# Patient Record
Sex: FEMALE | Race: BLACK OR AFRICAN AMERICAN | NOT HISPANIC OR LATINO | Employment: OTHER | URBAN - METROPOLITAN AREA
[De-identification: names, ages, dates, MRNs, and addresses within clinical notes are randomized per-mention and may not be internally consistent; named-entity substitution may affect disease eponyms.]

---

## 2017-02-15 ENCOUNTER — HISTORICAL (OUTPATIENT)
Dept: INTERNAL MEDICINE | Facility: CLINIC | Age: 72
End: 2017-02-15

## 2017-05-24 ENCOUNTER — HISTORICAL (OUTPATIENT)
Dept: INTERNAL MEDICINE | Facility: CLINIC | Age: 72
End: 2017-05-24

## 2018-05-08 ENCOUNTER — HISTORICAL (OUTPATIENT)
Dept: INTERNAL MEDICINE | Facility: CLINIC | Age: 73
End: 2018-05-08

## 2018-09-19 ENCOUNTER — HISTORICAL (OUTPATIENT)
Dept: RADIOLOGY | Facility: HOSPITAL | Age: 73
End: 2018-09-19

## 2018-10-11 ENCOUNTER — HISTORICAL (OUTPATIENT)
Dept: RADIOLOGY | Facility: HOSPITAL | Age: 73
End: 2018-10-11

## 2018-11-13 ENCOUNTER — HISTORICAL (OUTPATIENT)
Dept: ADMINISTRATIVE | Facility: HOSPITAL | Age: 73
End: 2018-11-13

## 2020-01-27 ENCOUNTER — HISTORICAL (OUTPATIENT)
Dept: INTERNAL MEDICINE | Facility: CLINIC | Age: 75
End: 2020-01-27

## 2020-05-06 ENCOUNTER — HISTORICAL (OUTPATIENT)
Dept: RADIOLOGY | Facility: HOSPITAL | Age: 75
End: 2020-05-06

## 2020-07-19 ENCOUNTER — HOSPITAL ENCOUNTER (OUTPATIENT)
Dept: MEDSURG UNIT | Facility: HOSPITAL | Age: 75
End: 2020-07-24
Attending: INTERNAL MEDICINE | Admitting: INTERNAL MEDICINE

## 2020-07-20 LAB
CHOLEST SERPL-MSCNC: 167 MG/DL (ref 0–200)
HDLC SERPL-MCNC: 34 MG/DL (ref 35–70)
LDLC SERPL CALC-MCNC: 110 MG/DL (ref 0–160)
TRIGL SERPL-MCNC: 115 MG/DL (ref 40–160)

## 2020-11-17 ENCOUNTER — HISTORICAL (OUTPATIENT)
Dept: INTERNAL MEDICINE | Facility: CLINIC | Age: 75
End: 2020-11-17

## 2020-11-17 LAB
HBA1C MFR BLD: 6.4 % (ref 4–6)
HCV AB SERPL QL IA: NEGATIVE

## 2020-11-30 ENCOUNTER — HISTORICAL (OUTPATIENT)
Dept: RADIOLOGY | Facility: HOSPITAL | Age: 75
End: 2020-11-30

## 2021-02-10 ENCOUNTER — HOSPITAL ENCOUNTER (OUTPATIENT)
Dept: MEDSURG UNIT | Facility: HOSPITAL | Age: 76
End: 2021-02-12
Attending: INTERNAL MEDICINE | Admitting: INTERNAL MEDICINE

## 2022-02-04 ENCOUNTER — HISTORICAL (OUTPATIENT)
Dept: ADMINISTRATIVE | Facility: HOSPITAL | Age: 77
End: 2022-02-04

## 2022-02-04 ENCOUNTER — HISTORICAL (OUTPATIENT)
Dept: RADIOLOGY | Facility: HOSPITAL | Age: 77
End: 2022-02-04

## 2022-04-07 ENCOUNTER — HISTORICAL (OUTPATIENT)
Dept: ADMINISTRATIVE | Facility: HOSPITAL | Age: 77
End: 2022-04-07
Payer: COMMERCIAL

## 2022-04-23 VITALS
DIASTOLIC BLOOD PRESSURE: 77 MMHG | SYSTOLIC BLOOD PRESSURE: 119 MMHG | BODY MASS INDEX: 34.27 KG/M2 | HEIGHT: 65 IN | WEIGHT: 205.69 LBS

## 2022-04-28 NOTE — ED PROVIDER NOTES
Patient:   Charlotte Choi            MRN: [id number]            FIN: [id number]               Age:   75 years     Sex:  Female     :  1945   Associated Diagnoses:   COVID-19 virus infection; Hypoxemia; Pneumonia   Author:   Luciana FLORES, Kiran Moore      Basic Information   Time seen: Date & time 2020 16:16:00.   History source: Patient.   Arrival mode: Private vehicle.   History limitation: None.   Additional information: Chief Complaint from Nursing Triage Note : Chief Complaint   2020 15:40 CDT      Chief Complaint           PT WEARING MASK IN /AASI W HX OF COVID + ON 20.  CO INCREASE IN SOB/CP SINCE THIS AM.  EKG OBTAINED.  .      History of Present Illness   The patient presents with difficulty breathing.  This is about her 10th or 11th day of illness, 1st symptoms onset about the 9th or the 10th of this month.  She was seen on the  at which time she was appropriate for outpatient management as documented, COVID testing was positive on that day.  She now presents with increasing dyspnea, notably since this morning.  Other symptoms have included fever, nausea, vomiting, diarrhea, chills, chest pain, generalized weakness, generalized body aches.  On arrival, initial oxygen saturation 92% on room air with mild tachypnea, borderline elevation of blood pressure and otherwise normal vital signs.  She has underlying risk factors including hypertension, diabetes, obesity.  No other specific complaints..        Review of Systems   Constitutional symptoms:  Fever, no chills, no weakness.    Skin symptoms:  Negative except as documented in HPI, no rash, no breakdown.    Eye symptoms:  Negative except as documented in HPI, no recent vision problems, no pain.    ENMT symptoms:  Negative except as documented in HPI, no ear pain, no sore throat.    Respiratory symptoms:  Shortness of breath, no cough, no wheezing.    Cardiovascular symptoms:  Chest pain, no palpitations, no syncope.     Gastrointestinal symptoms:  Nausea, vomiting, diarrhea, No abdominal pain,    Genitourinary symptoms:  Negative except as documented in HPI, no dysuria, no hematuria.    Musculoskeletal symptoms:  Negative except as documented in HPI, no Muscle pain, no Joint pain.    Neurologic symptoms:  Negative except as documented in HPI, no altered level of consciousness, no weakness.    Psychiatric symptoms:  Negative except as documented in HPI, no anxiety, no depression.    Endocrine symptoms:  Negative except as documented in HPI, no polyuria, no polydipsia.    Hematologic/Lymphatic symptoms:  Negative except as documented in HPI, bleeding tendency negative, bruising tendency negative.    Allergy/immunologic symptoms:  Negative except as documented in HPI, no recurrent infections, no impaired immunity.              Additional review of systems information: All other systems reviewed and otherwise negative, All systems reviewed as documented in chart.      Health Status   Allergies:    Allergic Reactions (Selected)  Severity Not Documented  Contrast media (iodine-based)- No reactions were documented.,    Allergies (1) Active Reaction  contrast media (iodine-based) None Documented  .   Medications:  (Selected)   Prescriptions  Prescribed  Flonase 50 mcg/inh nasal spray: 1 spray(s), Nasal, BID, # 1 bottle(s), 6 Refill(s), Pharmacy: [id number]  Lasix 20 mg oral tablet: 20 mg = 1 tab(s), Oral, Daily, # 90 tab(s), 3 Refill(s), Pharmacy: [facility], 163, cm, Height/Length Dosing, 02/11/20 8:48:00 CST, 96.2, kg, Weight Dosing, 02/11/20 8:48:00 CST  Lipitor 80 mg oral tablet: 80 mg = 1 tab(s), Oral, Daily, # 90 tab(s), 3 Refill(s), Pharmacy: [facility]  Metoprolol Succinate ER 25 mg oral tablet extended release: 25 mg = 1 tab(s), Oral, Daily, # 30 tab(s), 3 Refill(s), Pharmacy: [facility], 163, cm, Height/Length Dosing, 02/11/20 8:48:00 CST, 96.2, kg, Weight Dosing, 02/11/20 8:48:00  CST  Norvasc 10 mg oral tablet: 10 mg = 1 tab(s), Oral, Daily, # 30 tab(s), 6 Refill(s), Pharmacy: [facility]  blood pressure cuff: blood pressure cuff, See Instructions, take BP in the morning before breakfast, # 1 EA, 0 Refill(s)  cane straight: cane straight, See Instructions, please dispense cane to help with walking  Dx gait imbalance, # 1 EA, 0 Refill(s)  gabapentin 600 mg oral tablet: 600 mg = 1 tab(s), Oral, TID, # 270 tab(s), 3 Refill(s), Pharmacy: [facility]  glucometer: glucometer, See Instructions, Check CBG 4 times per day.  Medically necessary, # 1 EA, 0 Refill(s)  glucometer: glucometer, See Instructions, Check CBG 4 times per day.  Medically necessary, # 1 EA, 0 Refill(s)  lancets, strips, alcohol swabs: lancets, strips, alcohol swabs, See Instructions, medicially necessary  check CBGs 4x per day, # 100 EA, 11 Refill(s)  lisinopril 20 mg oral tablet: 20 mg = 1 tab(s), Oral, Daily, # 90 tab(s), 3 Refill(s), Pharmacy: [facility], 163, cm, Height/Length Dosing, 02/11/20 8:48:00 CST, 96.2, kg, Weight Dosing, 02/11/20 8:48:00 CST  metformin 500 mg oral tablet: 500 mg = 1 tab(s), Oral, BID, for diabetes, # 180 tab(s), 3 Refill(s), Pharmacy: [facility]  Documented Medications  Documented  latanoprost 0.005% ophthalmic solution: .      Past Medical/ Family/ Social History   Medical history:    Resolved  Diabetes mellitus ([id number]):  Resolved.  Hypertension ([id number]):  Resolved.  Hyperlipidemia ([id number]):  Resolved.  Arthritis ([id number]):  Resolved..   Surgical history:    hysterectomy.  Lumpectomy of breast ([id number])..   Family history:    DIABETES MELLITUS  Mother  Sister  Stroke  Brother  Alzheimer's disease  Sister  Sister  Cancer  Father  Hypertension.  Mother  Sister  .   Social history:    Social & Psychosocial Habits    Alcohol  10/12/2013 Risk Assessment: Denies Alcohol Use    02/11/2020  Use:  Never    Employment/School  11/21/2016  Status: Retired    Comment: 7TH GRADE - 11/21/2016 09:25 - [employer]    Exercise  02/11/2020  Duration (average number of minutes): 0    Home/Environment  02/11/2020  Lives with: Dino    Nutrition/Health  02/11/2020  Home Diet Regular    Substance Use  10/12/2013 Risk Assessment: Denies Substance Abuse    05/20/2015  Use: Never    Tobacco  10/12/2013 Risk Assessment: Denies Tobacco Use    02/11/2020  Use: Former smoker, quit more    Patient Wants Consult For Cessation Counseling N/A    07/19/2020  Use: Never (less than 100 in l    Patient Wants Consult For Cessation Counseling No    Abuse/Neglect  02/11/2020  SHX Any signs of abuse or neglect No    Feels unsafe at home: No    Safe place to go: Yes    07/19/2020  SHX Any signs of abuse or neglect No    Feels unsafe at home: No    Safe place to go: Yes  .   Problem list:    Active Problems (6)  2019-nCoV   DM (diabetes mellitus), type 2   HTN (hypertension)   Hyperlipidemia   Knowledge deficit   Muscle strain of left shoulder   .      Physical Examination               Vital Signs   Vital Signs   7/19/2020 15:40 CDT      Temperature Oral          37.1 DegC                             Temperature Oral (calculated)             98.78 DegF                             Peripheral Pulse Rate     90 bpm                             Respiratory Rate          20 br/min                             SpO2                      93 %                             Oxygen Therapy            Room air                             Systolic Blood Pressure   146 mmHg  HI                             Diastolic Blood Pressure  85 mmHg  .      Vital Signs (last 24 hrs)_____  Last Charted___________  Temp Oral     37.1 DegC  (JUL 19 15:40)  Heart Rate Peripheral   90 bpm  (JUL 19 15:40)  Resp Rate         20 br/min  (JUL 19 15:40)  SBP      H 146mmHg  (JUL 19 15:40)  DBP      85 mmHg  (JUL 19 15:40)  SpO2      93 %  (JUL 19 15:40)  Height      162  cm  (JUL 19 15:40)  .   Measurements   7/19/2020 15:40 CDT      Weight Dosing             100 kg                             Weight Measured and Calculated in Lbs     220.46 lb                             Weight Estimated          100 kg                             Height/Length Dosing      162 cm                             Height/Length Measured    162 cm  .   Basic Oxygen Information   7/19/2020 15:40 CDT      SpO2                      93 %                             Oxygen Therapy            Room air  .   General:  Alert, mild distress, anxious, Obese.    Skin:  Warm, dry, normal for ethnicity.    Head:  Normocephalic, atraumatic.    Neck:  Supple, trachea midline, no tenderness.    Eye:  Pupils are equal, round and reactive to light, extraocular movements are intact, normal conjunctiva.    Ears, nose, mouth and throat:  Oral mucosa moist, no pharyngeal erythema or exudate.    Cardiovascular:  Regular rate and rhythm, No murmur, Normal peripheral perfusion, No edema.    Respiratory:  Lungs are clear to auscultation, respirations are non-labored, breath sounds are equal, Symmetrical chest wall expansion.    Chest wall:  No tenderness, No deformity.    Back:  Nontender, Normal range of motion, Normal alignment.    Musculoskeletal:  Normal ROM, normal strength, no tenderness, no swelling, no deformity.    Gastrointestinal:  Soft, Nontender, Non distended, Normal bowel sounds.    Neurological:  Alert and oriented to person, place, time, and situation, No focal neurological deficit observed, CN II-XII intact, normal motor observed, normal speech observed.    Lymphatics:  No lymphadenopathy.   Psychiatric:  Cooperative, appropriate mood & affect, normal judgment.       Medical Decision Making   Documents reviewed:  Emergency department nurses' notes, emergency department records, prior records.    Orders  Launch Orders   Laboratory:  Urine Culture and Sensitivity (Order): Stat collect, 7/19/2020 16:37 CDT, Urine,  Clean Catch, Nurse collect  Urinalysis with Micro UHC (Order): Stat collect, Urine, 7/19/2020 16:37 CDT, Nurse collect  Troponin-I (Order): Stat collect, 7/19/2020 16:37 CDT, Blood, Lab Collect, 7/19/2020 16:37 CDT  Sedimentation Rate (Order): Stat collect, 7/19/2020 16:37 CDT, Blood, Lab Collect, 7/19/2020 16:37 CDT  PTT (Order): Stat collect, 7/19/2020 16:36 CDT, Blood, Lab Collect, 7/19/2020 16:36 CDT  Phosphorus Level (Order): Stat collect, 7/19/2020 16:36 CDT, Blood, Lab Collect, 7/19/2020 16:36 CDT  Magnesium Level (Order): Stat collect, 7/19/2020 16:36 CDT, Blood, Lab Collect, 7/19/2020 16:36 CDT  LDH (Order): Stat collect, 7/19/2020 16:36 CDT, Blood, Lab Collect, 7/19/2020 16:36 CDT  Lactic Acid (Order): Stat collect, 7/19/2020 16:36 CDT, Blood, Lab Collect, 7/19/2020 16:36 CDT  INR - Protime (Order): Stat collect, 7/19/2020 16:36 CDT, Blood, Lab Collect, 7/19/2020 16:36 CDT  Fibrinogen Level (Order): Stat collect, 7/19/2020 16:36 CDT, Blood, Lab Collect, 7/19/2020 16:36 CDT  Ferritin (Order): Stat collect, 7/19/2020 16:36 CDT, Blood, Lab Collect, 7/19/2020 16:36 CDT  D-Dimer (Order): Stat collect, 7/19/2020 16:36 CDT, Blood, Lab Collect, 7/19/2020 16:36 CDT  CRP (Order): Stat collect, 7/19/2020 16:35 CDT, Blood, Lab Collect, 7/19/2020 16:35 CDT  CPK (Order): Stat collect, 7/19/2020 16:35 CDT, Blood, Lab Collect, 7/19/2020 16:35 CDT  CMP (Order): Stat collect, 7/19/2020 16:35 CDT, Blood, Lab Collect, 7/19/2020 16:35 CDT  CK MB (Order): Stat collect, 7/19/2020 16:35 CDT, Blood, Lab Collect, 7/19/2020 16:35 CDT  CBC w/ Auto Diff (Order): Now collect, 7/19/2020 16:35 CDT, Blood, Lab Collect, 7/19/2020 16:35 CDT  BNP-Pro (Order): Stat collect, 7/19/2020 16:34 CDT, Blood, Lab Collect, 7/19/2020 16:34 CDT  Blood Culture (Order): 7/19/2020 16:34 CDT, Stat collect, Blood  Blood Culture (Order): 7/19/2020 16:34 CDT, Stat collect, Blood  ABG Adult RT (Order): Stat collect, Arterial Blood, 7/19/2020 16:34 CDT, Once,  Stop date 7/19/2020 16:34 CDT  Patient Care:  Saline Lock Insert (Order): 7/19/2020 16:36 CDT  Pulse Oximetry (Order): 7/19/2020 16:36 CDT  Patient Isolation (Order): 7/19/2020 16:36 CDT, Airborne Precautions, CM Isolation, Constant Indicator  Cardiac Monitoring (Order): 7/19/2020 16:34 CDT, Constant Order  Pharmacy:  [facility] HFA (Order): 4 puff(s), form: Inhaler, INH, Once, first dose 7/19/2020 16:34 CDT, stop date 7/19/2020 16:34 CDT, self administer 1 standard puff x 4 over 15-30 minutes; first dose now  albuterol CFC free 90 mcg/inh inhalation aerosol with adapter (Order): 4 puff(s), form: Inhaler, INH, Once, first dose 7/19/2020 16:34 CDT, stop date 7/19/2020 16:34 CDT, self administer 1 standard puff x 4 over 15-30 minutes; first dose now  Radiology:  CXR 1 View (Order): Stat, 7/19/2020 16:36 CDT, Dyspnea, None, Stretcher, Rad Type, Not Scheduled  Cardiology:  EKG (Order): 7/19/2020 16:36 CDT, NOW, -1, -1, 7/19/2020 16:36 CDT  Respiratory Therapy:  Oxygen Therapy (Order): 7/19/2020 16:36 CDT, 3, Nasal Cannula, After RA ABG, CM Oxygen.   Cardiac monitor:  Time 7/19/2020 16:39:00, Rate 90, normal sinus rhythm.    Electrocardiogram:  Time 7/19/2020 15:42:00, rate 93, normal sinus rhythm, No ST-T changes, no ectopy, normal MO & QRS intervals.    Results review:  Lab results : Lab View   7/19/2020 17:20 CDT      Sample ABG                ARTERIAL                             Treatment                 ROOM AIR                             Site                      Radial Rt                             pH Art                    7.48  HI                             pCO2 Art                  36.0 mmHg                             pO2 Art                   61.0 mmHg  LOW                             HCO3 Art                  26.8 mmol/L  HI                             CO2 Totl Art              27.9 mmol/L  HI                             O2 Sat Art                93.9 %                             D base                     3.4  HI                             THB ABG                   13.8 gm/dL                             CO Hgb                    1.5 %  NA                             Met Hgb Art               0.8 %                             O2 Hgb                    91.7 %  LOW                             Allens                    Positive  .   Chest X-Ray:  Time reported 7/19/2020 17:43:00, interpretation by Emergency Physician, Typical bilateral diffuse mild scattered interstitial infiltrates..       Reexamination/ Reevaluation   Time: 7/19/2020 17:41:00 .   Vital signs   results included from flowsheet : Vital Signs   7/19/2020 17:05 CDT      Peripheral Pulse Rate     85 bpm                             Heart Rate Monitored      85 bpm                             Respiratory Rate          30 br/min  HI                             SpO2                      96 %                             Oxygen Therapy            Room air                             Systolic Blood Pressure   118 mmHg                             Diastolic Blood Pressure  67 mmHg                             Mean Arterial Pressure, Cuff              84 mmHg    7/19/2020 15:40 CDT      Temperature Oral          37.1 DegC                             Temperature Oral (calculated)             98.78 DegF                             Peripheral Pulse Rate     90 bpm                             Respiratory Rate          20 br/min                             SpO2                      93 %                             Oxygen Therapy            Room air                             Systolic Blood Pressure   146 mmHg  HI                             Diastolic Blood Pressure  85 mmHg     Course: unchanged.   Pain status: unchanged.   Notes:   Stable, no change.    .      Impression and Plan   Diagnosis   COVID-19 virus infection (IEK11-HF U07.1)   Hypoxemia (FRJ30-UB R09.02)   Pneumonia (NFQ78-BT J18.9)      Calls-Consults   -  7/19/2020 17:42:00 ,   Discussed with Internal  Medicine - to see and admit.    .    Plan   Disposition: Admit time  7/19/2020 17:42:00, Admit to Inpatient Telemetry Unit, Int. med..

## 2022-04-28 NOTE — ED PROVIDER NOTES
Patient:   Charlotte Choi            MRN: [id number]            FIN: [id number]               Age:   75 years     Sex:  Female     :  1945   Associated Diagnoses:   Dysmetria; Romberg's test positive   Author:   Sarmad Jo      Basic Information   Time seen: Immediately upon arrival.   History source: Patient.   Arrival mode: Private vehicle, walking.   History limitation: None.   Additional information: Chief Complaint from Nursing Triage Note : Chief Complaint   2/10/2021 13:20 CST      Chief Complaint           c/o dizziness, sound of rushing in her ears. since yesterday  .   Provider/Visit info:   Time Seen:  Sarmad Jo / 02/10/2021 13:25  .   History of Present Illness   The patient presents with vertigo, Blurry vision and L ear tinnitus.  The onset was 1  days ago.  The course/duration of symptoms is constant.  The character of symptoms is spinning and off-balance.  The degree at present is moderate.  The exacerbating factor is none.  The relieving factor is none.  Risk factors consist of hypertension, diabetes mellitus, HLD, CHF, not coronary artery disease, not cerebral vascular accident, not smoking, not head trauma, not medication change and not recent illness/injury.  Prior episodes: none.  Therapy today: see nurses notes.  Associated symptoms: headache, Sinus congestion, Denies ear discharge, ear pain, diplopia, blindness, neck stiffness, denies chest pain, denies nausea, denies vomiting, denies shortness of breath, denies abdominal pain, denies syncope, denies palpitations, denies altered speech, denies altered coordination, denies focal weakness, denies altered sensation, denies confusion, denies seizure, denies blood in stool and denies vaginal bleeding.  Additional history: 74 yo F w/ PMHx significant for HTN, HLD, DM & CHF presents to ED c/o 1 day hx of vertigo w/ associated blurred vision & tinnitus in L ear. Patient denies hx of similar symptoms. Denies recent head  trauma. Denies any identifiable aggravating or alleviating factors.        Review of Systems   Constitutional symptoms:  Weakness, fatigue.    Skin symptoms:  Negative except as documented in HPI.   Eye symptoms:  Negative except as documented in HPI.   ENMT symptoms:  No sore throat, no sinus pain.    Respiratory symptoms:  No cough, no wheezing.    Cardiovascular symptoms:  Negative except as documented in HPI.   Gastrointestinal symptoms:  Negative except as documented in HPI.   Genitourinary symptoms:  Negative except as documented in HPI.   Musculoskeletal symptoms:  Negative except as documented in HPI.   Neurologic symptoms:  Negative except as documented in HPI.   Psychiatric symptoms:  Negative except as documented in HPI.   Endocrine symptoms:  Negative except as documented in HPI.   Hematologic/Lymphatic symptoms:  Bleeding tendency negative, bruising tendency negative.    Allergy/immunologic symptoms:  No recurrent infections, no impaired immunity.              Additional review of systems information: All other systems reviewed and otherwise negative.      Health Status   Allergies:    Allergic Reactions (Selected)  Severity Not Documented  Contrast media (iodine-based)- No reactions were documented.,    Allergies (1) Active Reaction  contrast media (iodine-based) None Documented  .   Medications:  (Selected)   Prescriptions  Prescribed  DULoxetine 30 mg oral delayed release capsule: 30 mg = 1 cap(s), Oral, Daily, do not crush or chew, # 30 cap(s), 6 Refill(s), Pharmacy: [facility], 162, cm, Height/Length Dosing, 11/17/20 12:50:00 CST, 93, kg, Weight Dosing, 11/17/20 12:50:00 CST  Flonase 50 mcg/inh nasal spray: 1 spray(s), Nasal, BID, # 1 bottle(s), 6 Refill(s), Pharmacy: [facility]  Lasix 20 mg oral tablet: 20 mg = 1 tab(s), Oral, Daily, # 90 tab(s), 3 Refill(s), Pharmacy: [facility], 163, cm, Height/Length Dosing, 02/11/20 8:48:00 CST, 96.2, kg, Weight Dosing,  02/11/20 8:48:00 CST  Lipitor 80 mg oral tablet: 80 mg = 1 tab(s), Oral, Daily, # 90 tab(s), 3 Refill(s), Pharmacy: [facility], 162, cm, Height/Length Dosing, 07/19/20 18:46:00 CDT, 100, kg, Weight Dosing, 07/19/20 18:46:00 CDT  Metoprolol Succinate ER 25 mg oral tablet extended release: 25 mg = 1 tab(s), Oral, Daily, # 30 tab(s), 3 Refill(s), Pharmacy: [facility], 163, cm, Height/Length Dosing, 02/11/20 8:48:00 CST, 96.2, kg, Weight Dosing, 02/11/20 8:48:00 CST  Norvasc 10 mg oral tablet: 10 mg = 1 tab(s), Oral, Daily, # 30 tab(s), 6 Refill(s), Pharmacy: [facility] [address], 162, cm, Height/Length Dosing, 07/19/20 18:46:00 CDT, 100, kg, Weight Dosing, 07/19/20 18:46:00 CDT  blood pressure cuff: blood pressure cuff, See Instructions, take BP in the morning before breakfast, # 1 EA, 0 Refill(s)  cane straight: cane straight, See Instructions, please dispense cane to help with walking  Dx gait imbalance, # 1 EA, 0 Refill(s)  gabapentin 600 mg oral tablet: 600 mg = 1 tab(s), Oral, TID, # 270 tab(s), 3 Refill(s), Pharmacy: [facility] [address], 162, cm, Height/Length Dosing, 07/19/20 18:46:00 CDT, 100, kg, Weight Dosing, 07/19/20 18:46:00 CDT  glucometer: glucometer, See Instructions, Check CBG 4 times per day.  Medically necessary, # 1 EA, 0 Refill(s)  glucometer: glucometer, See Instructions, Check CBG 4 times per day.  Medically necessary, # 1 EA, 0 Refill(s)  lancets, strips, alcohol swabs: lancets, strips, alcohol swabs, See Instructions, medicially necessary  check CBGs 4x per day, # 100 EA, 11 Refill(s)  lisinopril 20 mg oral tablet: 20 mg = 1 tab(s), Oral, Daily, # 90 tab(s), 3 Refill(s), Pharmacy: [facility], 163, cm, Height/Length Dosing, 02/11/20 8:48:00 CST, 96.2, kg, Weight Dosing, 02/11/20 8:48:00 CST  metformin 500 mg oral tablet: 500 mg = 1 tab(s), Oral, BID, for diabetes, # 180 tab(s), 3 Refill(s), Pharmacy: [facility], 162, cm, Height/Length Dosing,  07/19/20 18:46:00 CDT, 100, kg, Weight Dosing, 07/19/20 18:46:00 CDT  tretinoin 0.02% topical cream: 1 hira, TOP, Once a day (at bedtime), # 40 gm, 2 Refill(s), Pharmacy: [facility], 162, cm, Height/Length Dosing, 11/17/20 12:50:00 CST, 93, kg, Weight Dosing, 11/17/20 12:50:00 CST, per nurse's notes.   Immunizations: Per nurse's notes.      Past Medical/ Family/ Social History   Medical history:    Resolved  Diabetes mellitus ([id number]):  Resolved.  Hypertension ([id number]):  Resolved.  Hyperlipidemia (33623339):  Resolved.  Arthritis ([id number]):  Resolved., Reviewed as documented in chart.   Surgical history:    hysterectomy.  Lumpectomy of breast ([id number])., Reviewed as documented in chart.   Family history:    DIABETES MELLITUS  Mother  Sister  Stroke  Brother  Alzheimer's disease  Sister  Sister  Cancer  Father  Hypertension.  Mother  Sister  , Reviewed as documented in chart.   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    Exercise  11/17/2020  Duration (average number of minutes): 10    Times per week: 1-2 times/week    Exercise type: Walking    Home/Environment  02/11/2020  Lives with: Son    Nutrition/Health  11/17/2020  Home Diet Regular    Substance Use  10/12/2013 Risk Assessment: Denies Substance Abuse    11/17/2020  Use: Never    Tobacco  10/12/2013 Risk Assessment: Denies Tobacco Use    11/17/2020  Use: Never (less than 100 in l    Patient Wants Consult For Cessation Counseling N/A    02/10/2021  Use: Never (less than 100 in l    Patient Wants Consult For Cessation Counseling No    Abuse/Neglect  11/17/2020  SHX Any signs of abuse or neglect No    Feels unsafe at home: No    Safe place to go: Yes    02/10/2021  SHX Any signs of abuse  or neglect No    Feels unsafe at home: No    Safe place to go: Yes    Spiritual/Cultural  11/17/2020  Rastafari Preference Anabaptist      11/17/2020  Branch of  Never in   , Reviewed as documented in chart.   Problem list:    Active Problems (8)  2019-nCoV   Diastolic dysfunction   DM (diabetes mellitus), type 2   HTN (hypertension)   Hyperlipidemia   Impaired mobility   Knowledge deficit   Muscle strain of left shoulder   , per nurse's notes.      Physical Examination               Vital Signs   Vital Signs   2/10/2021 13:20 CST      Temperature Oral          36.5 DegC                             Temperature Oral (calculated)             97.70 DegF                             Peripheral Pulse Rate     82 bpm                             Respiratory Rate          20 br/min                             SpO2                      97 %                             Oxygen Therapy            Room air                             Systolic Blood Pressure   184 mmHg  HI                             Diastolic Blood Pressure  83 mmHg  .      Vital Signs (last 24 hrs)_____  Last Charted___________  Temp Oral     36.5 DegC  (FEB 10 13:20)  Heart Rate Peripheral   82 bpm  (FEB 10 13:20)  Resp Rate         20 br/min  (FEB 10 13:20)  SBP      H 184mmHg  (FEB 10 13:20)  DBP      83 mmHg  (FEB 10 13:20)  SpO2      97 %  (FEB 10 13:20)  Weight      95.2 kg  (FEB 10 13:20)  .   Measurements   2/10/2021 13:20 CST      Weight Dosing             95.2 kg                             Weight Measured           95.2 kg                             Weight Measured and Calculated in Lbs     209.88 lb  .   Basic Oxygen Information   2/10/2021 13:20 CST      SpO2                      97 %                             Oxygen Therapy            Room air  .   General:  Alert, no acute distress, not anxious, not ill-appearing.    Skin:  Warm, dry, intact, no pallor, no rash, normal for ethnicity.    Head:  Normocephalic, atraumatic.     Neck:  Supple, trachea midline, no tenderness, no carotid bruit.    Eye:  Pupils are equal, round and reactive to light, intact accommodation, extraocular movements are intact, normal conjunctiva, vision grossly normal.    Ears, nose, mouth and throat:  Tympanic membranes clear, oral mucosa moist, no pharyngeal erythema or exudate.    Cardiovascular:  Regular rate and rhythm, No murmur, Normal peripheral perfusion.    Respiratory:  Lungs are clear to auscultation, respirations are non-labored, breath sounds are equal, Symmetrical chest wall expansion.    Chest wall:  No tenderness, No deformity.    Back:  Nontender.   Musculoskeletal:  No tenderness, no swelling.    Gastrointestinal:  Soft, Nontender, Non distended, Normal bowel sounds.    Neurological:  No focal neurological deficit observed, Level of consciousness: Appropriate for age, Cognitive function: Oriented x 4, to person, to place, to time, to situation, normal thought processes, Cranial nerves II - XII: Intact, Motor strength: Equal bilaterally, Sensory: Right upper extremity, left upper extremity, right lower extremity, left lower extremity, facial, normal, Coordination: Finger(s) to nose (bilateral, abnormal), heel(s) of foot to opposite shin (bilateral, abnormal), Romberg test negative, Speech: Normal, Gait: Normal.    Lymphatics:  No lymphadenopathy.   Psychiatric:  Cooperative, appropriate mood & affect, normal judgment.       Medical Decision Making   Differential Diagnosis:  Vertigo, cerebral vascular accident, transient ischemic attack, sinusitis.    Documents reviewed:  Emergency department nurses' notes, emergency department records, prior records.    Orders  Launch Order Profile (Selected)   Inpatient Orders  Completed  Automated Diff: NOW collect, 02/10/21 13:35:00 CST, Blood, Collected, Stop date 02/10/21 13:35:00 CST, Lab Collect, 02/10/21 13:27:00 CST  BMP: STAT collect, 02/10/21 13:35:58 CST, BLOOD, Collected, Stop date 02/10/21 13:35:00  CST, Lab Collect  CBC w/ Auto Diff: NOW collect, 02/10/21 13:35:58 CST, BLOOD, Collected, Stop date 02/10/21 13:35:00 CST, Lab Collect  CT Head W/O Contrast: Stat, 02/10/21 13:59:00 CST, Dizziness , vertigo, None, Stretcher, Rad Type, Schedule this test, 02/10/21 13:59:00 CST  Estimated Glomerular Filtration Rate: STAT collect, 02/10/21 13:35:00 CST, Blood, Collected, Stop date 02/10/21 13:35:00 CST, Lab Collect, 02/10/21 13:27:00 CST  .   Results review:  Lab results : Lab View   2/10/2021 13:35 CST      Sodium Lvl                140 mmol/L                             Potassium Lvl             3.9 mmol/L                             Chloride                  104 mmol/L                             CO2                       29 mmol/L                             Calcium Lvl               9.7 mg/dL                             Glucose Lvl               98 mg/dL                             BUN                       11.0 mg/dL                             Creatinine                0.77 mg/dL                             BUN/Creat Ratio           14  NA                             eGFR-AA                   93                             eGFR-LAURA                  77 mL/min/1.73 m2  LOW                             WBC                       7.7 x10(3)/mcL                             RBC                       5.19 x10(6)/mcL                             Hgb                       13.9 gm/dL                             Hct                       44.1 %                             Platelet                  313 x10(3)/mcL                             MCV                       85.0 fL                             MCH                       26.8 pg                             MCHC                      31.5 gm/dL                             RDW                       13.6 %                             MPV                       10.9 fL  HI                             Abs Neut                  3.34 x10(3)/mcL                             Neutro  Auto               43 %  NA                             Lymph Auto                44 %  NA                             Mono Auto                 9 %  NA                             Eos Auto                  3 %  NA                             Abs Eos                   0.2 x10(3)/mcL                             Basophil Auto             1 %  NA                             Abs Neutro                3.34 x10(3)/mcL                             Abs Lymph                 3.4 x10(3)/mcL                             Abs Mono                  0.7 x10(3)/mcL                             Abs Baso                  0.1 x10(3)/mcL                             IG%                       0 %  NA                             IG#                       0.030  NA    ,    No qualifying data available, Interpretation Normal results.    Radiology results:  Reviewed radiologist's report, reveals no acute disease process, Rad Results (ST)  < 12 hrs   Accession: [id number]  Order: CT Head W/O Contrast  Report Dt/Tm: 02/10/2021 14:35  Report:   History: Dizziness , vertigo  Comparison studies:  None.     Technique:   Axial scans were obtained from skull base to the vertex.  Coronal and sagittal reconstructions obtained from the axial data.   Automatic exposure control was utilized to limit radiation dose.  Contrast: None     Radiation Dose:  Total DLP: 1345 mGy*cm     DISCUSSION:     Scalp/Skull:  No abnormalities.     Brain sulci: Appropriate for patient's age.  Ventricles: Normal in size and configuration. No hydrocephalus.     Extra-axial spaces:  No masses or fluid collections.     Parenchyma:   Discrete and confluent supratentorial white matter hypodensities are  moderate nonspecific chronic microangiopathic changes, statistically  chronic microvascular ischemia.  No mass, hemorrhage or CT evidence of an acute vascular insult.     Dural sinuses: No abnormal densities.  Sellar/Suprasellar region: No abnormalities.  Skull base and  Craniocervical junction: No abnormalities.     Incidental findings:   Status post bilateral cataract surgery.  Carotid siphon atherosclerotic vascular calcifications.     IMPRESSION:     No acute intracranial abnormalities.      .       Reexamination/ Reevaluation   Time: 2/10/2021 14:50:00 .   Vital signs   Basic Oxygen Information   2/10/2021 13:20 CST      SpO2                      97 %                             Oxygen Therapy            Room air     Course: progressing as expected, well controlled, Despite unremarkable CT head, patient continues to exhibit dysmetria w/r/t abnormal finger-to-nose & heel-to-shin tests, in addition to positive romberg at time of initial exam. Dr. Perez also examined patient & agreed w/ IM consult. Patient resting comfortably in room w/ no acute complaints & is amenable to this plan of care.      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 Perez performed face-to-face evaluation at bedside & agreed w/ decision for IM consult for further stroke workup.    -  2/10/2021 15:12:00 , IM, phone call, consult, recommends Will evaluate patient for admission.    Plan   Condition: Stable.    Disposition: Admit time  2/10/2021 14:52:00.    Counseled: Patient, Regarding diagnosis, Regarding diagnostic results, Regarding treatment plan, Regarding prescription, Patient indicated understanding of instructions.       Addendum   Patient presents with dizziness since yesterday, along with gait disturbance.  On exam she has dysmetria on the right as well as rhomberg +.  Ct neg.  Will consult IM for observation MRI etc.

## 2022-05-01 NOTE — HISTORICAL [facility] CERNER
This is a historical note converted from Aminata. Formatting and pictures may have been removed.  Please reference Aminata for original formatting and attached multimedia. Chief Complaint:Dizziness x2 days  ?   History of Present Illness  ?  74 y/o female with PMH DM type II, HTN, history of lumbar spinal stenosis, diastolic dysfunction who presented to the ED on 2/10/2020 with complaints of dizziness for the past 2 days. ?Patient states yesterday in the morning she was watching TV and then suddenly became dizzy, states that she feels she is spinning around. ?She also describes a ringing sensation in her left ear that is constant. ?She reports that the dizziness is also constant, exacerbated with sitting up or trying to walk. ?She denies any falls however she has to walk with her hand on the walls or grab onto furniture to ambulate. ?She has never had any prior episodes similar to this. ?She denies any muscle weakness, loss of sensation, slurred speech, facial drooping chest pain, palpitations, shortness of breath. ?She does endorse occasional blurred vision but for the most part has no difficulty seeing. ?Denies any recent change in medications, head trauma, seizures, syncope, HA.  ?  ?  PMH: DM type II, HTN, history of lumbar spinal stenosis, diastolic dysfunction  ?  PSX:  Lumpectomy of breast  hysterectomy  ?  FMH:  Father: Cancer  Mother: DIABETES MELLITUS; Hypertension.  Brother: Stroke  Sister: Alzheimers disease; DIABETES MELLITUS; Hypertension.  Sister: Alzheimers disease  ?  Allergies: contrast media (iodine-based)  ?  Social  Tobacco: Denies  ETOH abuse: Denies  Illicit drug use: Denies  ?   ROS  General:?no fever, no night sweats, chills, fatigue, changes in weight.  Skin: no rashes, bruises, petechia  HEENT: no headache, head injury, no acute vision changes.  CVS: no chest pain, palpitations, orthopnea, PND, edema  Resp: no SOB, cough, wheezing  GI: no dysphagia, melena, hematochezia, abdominal pain,  nausea, vomiting, constipation.  : no dysuria, hematuria, polyuria, CVA pain, nocturia  Musculoskeletal: no joint stiffness, pain, swelling or weakness  Neuro: no headaches, syncope, seizures, numbness, tingling, +vertigo, +dizziness  ?   Home medications  Home Medications (15) Active  blood pressure cuff?See Instructions  cane straight?See Instructions  DULoxetine 30 mg oral delayed release capsule?30 mg = 1 cap(s), Oral, Daily  Flonase 50 mcg/inh nasal spray?1 spray(s), Nasal, BID  gabapentin 600 mg oral tablet?600 mg = 1 tab(s), Oral, TID  glucometer?See Instructions  glucometer?See Instructions  lancets, strips, alcohol swabs?See Instructions  Lasix 20 mg oral tablet?20 mg = 1 tab(s), Oral, Daily  Lipitor 80 mg oral tablet?80 mg = 1 tab(s), Oral, Daily  lisinopril 20 mg oral tablet?20 mg = 1 tab(s), Oral, Daily  metformin 500 mg oral tablet?500 mg = 1 tab(s), Oral, BID  Metoprolol Succinate ER 25 mg oral tablet extended release?25 mg = 1 tab(s), Oral, Daily  Norvasc 10 mg oral tablet?10 mg = 1 tab(s), Oral, Daily  tretinoin 0.02% topical cream?1 hira, TOP, Once a day (at bedtime)  ?  Physical exam  Vital Signs (last 24 hrs)_____??????????Last Charted___________  Temp Oral??????????????????????36.8 DegC ?(FEB 10 19:32)  Heart Rate Peripheral?????????????????86 bpm ?(FEB 10 19:32)  Resp Rate???????????????????????20 br/min ?(FEB 10 19:32)  SBP??????????????????????H?162mmHg ?(FEB 10 19:32)  DBP??????????????????????81 mmHg ?(FEB 10 19:32)  SpO2??????????????????????98 % ?(FEB 10 19:32)  Weight??????????????????????95.2 kg ?(FEB 10 18:16)  Height??????????????????????162 cm ?(FEB 10 18:16)  BMI??????????????????????36.27 ?(FEB 10 18:16)  ?  General: AAOx3, NAD, alert and cooperative  HEENT: PERRLA, EOMI. No nystagmus ellicited. Otoscope exam shows no evidence of cerumen impatcion, normal appearing tympanic membrane b/l, no erythema or exudates  Neck: Supple.  CVS: S1/S2 nml, RRR, no murmurs, rubs or gallops, no  carotid bruits  Resp: CTA b/l, no wheezing  GI: Not distended, not tender, BS+, no guarding  Skin: not jaundiced, warm, no rashes  Musculoskeletal: normal ROM  Extremities: No edema, peripheral pulses intact  Neuro: CN II-XII grossly intact, strength and sensation symmetric and intact throughout, no focal neurological deficits. Negative rhomberg, difficulty with tandem walking.  ?   Labs  Labs Last 24 Hours?  ?Chemistry? Hematology/Coagulation?   Sodium Lvl: 140 mmol/L (02/10/21 13:35:00) WBC: 7.7 x10(3)/mcL (02/10/21 13:35:00)   Potassium Lvl: 3.9 mmol/L (02/10/21 13:35:00) RBC: 5.19 x10(6)/mcL (02/10/21 13:35:00)   Chloride: 104 mmol/L (02/10/21 13:35:00) Hgb: 13.9 gm/dL (02/10/21 13:35:00)   CO2: 29 mmol/L (02/10/21 13:35:00) Hct: 44.1 % (02/10/21 13:35:00)   Calcium Lvl: 9.7 mg/dL (02/10/21 13:35:00) Platelet: 313 x10(3)/mcL (02/10/21 13:35:00)   Glucose Lvl: 98 mg/dL (02/10/21 13:35:00) MCV: 85 fL (02/10/21 13:35:00)   BUN: 11 mg/dL (02/10/21 13:35:00) MCH: 26.8 pg (02/10/21 13:35:00)   Creatinine: 0.77 mg/dL (02/10/21 13:35:00) MCHC: 31.5 gm/dL (02/10/21 13:35:00)   Est Creat Clearance Ser: 52.35 mL/min (02/10/21 18:18:47) RDW: 13.6 % (02/10/21 13:35:00)   BUN/Creat Ratio: 14 (02/10/21 13:35:00) MPV:?10.9 fL?High (02/10/21 13:35:00)   eGFR-AA: 93 (02/10/21 13:35:00) Abs Neut: 3.34 x10(3)/mcL (02/10/21 13:35:00)   eGFR-LAURA:?77 mL/min/1.73 m2?Low (02/10/21 13:35:00) Neutro Auto: 43 % (02/10/21 13:35:00)   Vitamin B12 Lvl: 528 pg/mL (02/10/21 17:26:00) Lymph Auto: 44 % (02/10/21 13:35:00)    Mono Auto: 9 % (02/10/21 13:35:00)    Eos Auto: 3 % (02/10/21 13:35:00)    Abs Eos: 0.2 x10(3)/mcL (02/10/21 13:35:00)    Basophil Auto: 1 % (02/10/21 13:35:00)    Abs Neutro: 3.34 x10(3)/mcL (02/10/21 13:35:00)    Abs Lymph: 3.4 x10(3)/mcL (02/10/21 13:35:00)    Abs Mono: 0.7 x10(3)/mcL (02/10/21 13:35:00)    Abs Baso: 0.1 x10(3)/mcL (02/10/21 13:35:00)    IG%: 0 % (02/10/21 13:35:00)    IG#: 0.03 (02/10/21 13:35:00)    ?  ?  Radiology  Accession:?[id number]  Order:?CT Head W/O Contrast  Report Dt/Tm:?02/10/2021 14:35  Report:?  History: Dizziness , vertigo  Comparison studies:  None.  ?  Technique:?  Axial scans were obtained from skull base to the vertex.  Coronal and sagittal reconstructions obtained from the axial data.?  Automatic exposure control was utilized to limit radiation dose.  Contrast: None  ?  Radiation Dose:  Total DLP: 1345 mGy*cm  ?  DISCUSSION:  ?  Scalp/Skull:  No abnormalities.  ?  Brain sulci: Appropriate for patients age.  Ventricles: Normal in size and configuration. No hydrocephalus.  ?  Extra-axial spaces:  No masses or fluid collections.  ?  Parenchyma:?  Discrete and confluent supratentorial white matter hypodensities are  moderate nonspecific chronic microangiopathic changes, statistically  chronic microvascular ischemia.  No mass, hemorrhage or CT evidence of an acute vascular insult.  ?  Dural sinuses: No abnormal densities.  Sellar/Suprasellar region: No abnormalities.  Skull base and Craniocervical junction: No abnormalities.  ?  Incidental findings:?  Status post bilateral cataract surgery.  Carotid siphon atherosclerotic vascular calcifications.  ?  IMPRESSION:  ?  No acute intracranial abnormalities.  ?  ?  Assessment and Plan  ?  Vertigo vs CVA  L sided tinnitus  DM type II  HTN  Lumbar spinal stenosis  Diastolic dysfunction  ?  - ED PA concerned for ischemic process although dizziness and tinnitus are more likley 2/2 vertigo as patient has h/o DM. Concern for menieres dx given tinnitus  - ED PA also illicited +rhomberg although this was not present on exam. Noted patient almost lost her balance while walking in tandem steps and required assistance with the wall  - CT head negative, will need MRI to assess for pathology  - Given low liklihood of CVA and patients allergy to contrast (she reports diffuse edema), do not feel exposing the patient to possible anaphylaxis is worth a low yield  result. If MRI shows evidence of ischemic CVA then CTA head and neck will then be warrented. For now, ordering US carotid and echo with bubble study  - Starting patient on meclizine PRN and Solumedrol 40mg IV x1  - Neuro checks q4h  - Continue atorvastatin, norvasc, lasix, lisinopril  ?  ?  Prophylaxis: Lovenox  Nutrition: Diabetic Meal Plan  Antimicrobials:?None  Fluids: NS IVF  ?  Disposition: Admit to medicine unit for stroke workup vs vertigo workup. Consider ENT or neuro?consult in AM if symptoms not alleviated with steroids and meclizine. f/u MRI, carotid US, echo with bubble study.  ?  ?   Reviewed history, physical findings,laboratory data and CT Head.? Patient seen and examined.? Complaining of vertigo which is constant and exacerbated by everything.? Walking difficult secondary to dizziness. Also complaining of tinnitus in left ear.? HINTS exam : no saccades on head impulse, no nystagmus appreciated, no skew noted.? MRI brain negative for posterior stroke.? Tinnitus L ear.? Will start Dyazide, low salt diet, Diazepam prn dizziness for suspected Meniere disease.

## 2022-05-01 NOTE — HISTORICAL [facility] CERNER
This is a historical note converted from Aminata. Formatting and pictures may have been removed.  Please reference Aminata for original formatting and attached multimedia. Chief Complaint  PT WEARING MASK IN /AASI W HX OF COVID + ON 20. ?CO INCREASE IN SOB/CP SINCE THIS AM. ?EKG OBTAINED.  History of Present Illness  Patient is a 75-year-old female with past medical history of type II diabetes, hypertension, hyperlipidemia, and diastolic mitral dysfunction noted on echo on 2020 with EF of 55-60%.??Patient presented to the ED with increased shortness of breath and reported chest pain in triage however patient denied any chest pain to me in room. ?Patient previously tested positive for COVID on 2020 with start of symptoms day prior. ?Patient has no known contacts with this anyone thats COVID positive. ?Patient has associated weakness, fatigue, shortness of breath, nonproductive cough, diarrhea, nausea and vomiting. ?Emesis was nonbloody nonbilious and has had no episodes since being in the hospital. ?Patient has 4-5 bowel movements a day for the last couple of days, described as foul smelling and watery with no obvious blood present. ?Patient endorses two-pillow orthopnea, PND, shortness of breath with exertion that has been worsening over the past 6 months (acutely worsening over the last week).?  ?  In the ED, patient was breathing 93-97% on room air while resting however when in room patient desatted to upper 80s while in room with exertion and while talking. ?ABG showed a pH of 7.48, PCO2 36, PO2 of 61, HCO3 of 26.8. ?EKG showed normal sinus rhythm?with a QTC of 437.? Troponins?were negative.  ?  Surgical history: Hysterectomy, left breast cyst removal  Social history: Denies alcohol, tobacco, illicit drug abuse  Family history: Father  from lung cancer, mother had history of heart disease with pacemaker  ?  Review of Systems  Constitutional: No fever, chills, wt loss, +weakness,?  +fatigue.?  HEENT: No vision change/blurry vision, ear pain, nasal congestion, sore throat.?  Respiratory:?+ shortness of breath, +cough  Cardiovascular: No chest pain, palpitations, peripheral edema.?  Gastrointestinal: +n/v/d,no?c, No abdominal pain  Genitourinary: No dysuria, gross hematuria?  Endocrine: No polyuria, polydipsia  Musculoskeletal: No muscle pain, joint pain or deformity?  Integumentary: No rash, breakdown, wounds, No foot ulcers  Neuro: No focal neuro deficit, No change in sensation, No burning sensation in hands or feet.?  ?  Physical Exam  Vitals & Measurements  T:?37.1? ?C (Oral)? HR:?99(Peripheral)? HR:?97(Monitored)? RR:?19? BP:?127/93? SpO2:?97%? WT:?100?kg? BMI:?38.1?  General: Alert and oriented, visibly SOB.  HEENT: Normocephalic, atraumatic  Neck: Supple, No lymphadenopathy, no JVD.  Respiratory: Lungs are clear to auscultation, increased work of breathing, Breath sounds are equal.  Cardiovascular: Normal rate, Regular rhythm, No murmur, No gallop.  Gastrointestinal: Soft, Non-tender, Non-distended, Normal bowel sounds, No organomegaly.  Musculoskeletal: Normal range of motion, Normal strength, No tenderness, No edema.  Integumentary: Warm, Pink, No pallor.  Neurologic: Alert, Oriented, CN II-XII?no gross neurological deficits.  Psychiatric: Appropriate mood and affect, Normal judgment.  Assessment/Plan  COVID-19 positive  Gastroenteritis likely secondary to COVID 19  Metabolic alkalosis  Hypertension  Hyperlipidemia  Type II diabetes  Diastolic mitral dysfunction; EF of 55-60% on echo 5/6/2020  ?  -Patient admitted for acute monitoring of hypoxia with increased work of breathing  -O2 sats dropped to the upper 80s with exertion, but remained in the upper 90s while at rest  -Started patient on Rocephin, azithromycin, and Remedesivir; EUA on over for Remedesivir with patient, risks and benefits discussed, and all questions answered  -EKG showed normal sinus rhythm with a QTC of 437;  troponins negative  -Started patient on dexamethasone 4 mg twice a day  -Continue patients home blood pressure medications; Norvasc 10 mg daily, lisinopril 20 mg daily, Lasix 20 mg daily, metoprolol succinate 25 mg daily  -Ordered repeat echo in a.m.; last echo on 5/6/2020  -Ordered proBNP to evaluate for possible heart failure; no visible signs of fluid overload  -Ordered fecal leukocytes, stool culture, C. diff stool toxin to evaluate patients gastroenteritis; likely secondary to COVID illness  -Blood cultures and urine cultures pending-On Lipitor 80 mg daily with poorly controlled hypercholesterolemia; repeat lipid panel and consider adding Zetia if needed  -If patient remains stable, can potentially be discharged?in the a.m. with further evaluation for home O2 if needed  ?  Date of symptom onset:?7/10/2020  Date Positive:?7/11/2020  Hospital Day:?0  Oxygen Requirement:?Currently room air  LDH, D-Dimer, Ferritin, ESR, CRP Trend:, ferritin 616.8, CRP 2.2, d-dimer 0.8  Renal Function:?Normal and at baseline  Antibiotics Name/Dose/Day:?Rocephin?2 g daily (Day 1), azithromycin?500 mg daily (Day1)  Steroids Dose/Day:?Dexamethasone 4 mg BID (Day 0)  Remdesivir Y/N/Day: Day 1/5  Anticoagulation:FD Lovenox  ?   Problem List/Past Medical History  Ongoing  2019-nCoV  DM (diabetes mellitus), type 2  HTN (hypertension)  Hyperlipidemia  Muscle strain of left shoulder  Historical  Arthritis  Diabetes mellitus  Hyperlipidemia  Hypertension  Procedure/Surgical History  hysterectomy  Lumpectomy of breast   Medications  Inpatient  acetaminophen, 650 mg= 2 tab(s), Oral, q6hr, PRN  azithromycin (Zithromax) for IVPB  dexamethasone, 4 mg= 1 mL, IV Push, BID  Dextrose 50% and Water (50 mL vial/syringe), 12.5 gm= 25 mL, IV Push, Once, PRN  Dextrose 50% and Water (50 mL vial/syringe), 12.5 gm= 25 mL, IV Push, As Directed, PRN  Dextrose 50% and Water (50 mL vial/syringe), 25 gm= 50 mL, IV Push, As Directed, PRN  Dextrose 50% in  Water intravenous solution, 12.5 gm= 25 mL, IV Push, As Directed, PRN  gabapentin 300 mg oral capsule, 600 mg= 2 cap(s), Oral, TID  glucagon recombinant 1 mg injection, 1 mg= 1 EA, IM, q10min, PRN  glucagon recombinant 1 mg injection, 1 mg= 1 EA, IM, q10min, PRN  glucose 40% oral gel, 15 gm= 0.5 tube(s), Oral, As Directed, PRN  insulin lispro 100 units/mL subcutaneous injection, 2-14 units, Subcutaneous, As Directed, PRN  Lasix 20 mg oral tablet, 20 mg= 1 tab(s), Oral, Daily  Lipitor 20 mg oral tablet, 80 mg= 4 tab(s), Oral, Daily  lisinopril, 20 mg= 2 tab(s), Oral, Daily  Lovenox, 100 mg= 1 mL, 1 mg/kg, Subcutaneous, BID  Metoprolol Succinate ER 25 mg oral tablet extended release, 25 mg= 1 tab(s), Oral, Daily  Norvasc 10 mg oral tablet, 10 mg= 1 tab(s), Oral, Daily  Pepcid 20 mg oral tablet, 20 mg= 1 tab(s), Oral, BID  Proventil HFA 90 mcg/inh inhalation aerosol with adapter, 90 mcg= 1 puff(s), INH, q4hr, PRN  remdesivir  remdesivir  Rocephin (for IVPB)  Zofran, 4 mg= 2 mL, IV Push, q4hr, PRN  Zofran, 8 mg= 4 mL, IV Push, q4hr, PRN  Home  blood pressure cuff, See Instructions  cane straight, See Instructions  Flonase 50 mcg/inh nasal spray, 1 spray(s), Nasal, BID, 6 refills  gabapentin 600 mg oral tablet, 600 mg= 1 tab(s), Oral, TID, 3 refills  glucometer, See Instructions  glucometer, See Instructions  lancets, strips, alcohol swabs, See Instructions, 11 refills  Lasix 20 mg oral tablet, 20 mg= 1 tab(s), Oral, Daily, 3 refills  latanoprost 0.005% ophthalmic solution  Lipitor 80 mg oral tablet, 80 mg= 1 tab(s), Oral, Daily, 3 refills  lisinopril 20 mg oral tablet, 20 mg= 1 tab(s), Oral, Daily, 3 refills  metformin 500 mg oral tablet, 500 mg= 1 tab(s), Oral, BID, 3 refills  Metoprolol Succinate ER 25 mg oral tablet extended release, 25 mg= 1 tab(s), Oral, Daily, 3 refills  Norvasc 10 mg oral tablet, 10 mg= 1 tab(s), Oral, Daily, 6 refills  Allergies  contrast media (iodine-based)  Social History  Abuse/Neglect  No,  No, Yes, 07/19/2020  No, No, Yes, 02/11/2020  Alcohol - Denies Alcohol Use, 10/12/2013  Never, 02/11/2020  Employment/School  Retired, 11/21/2016  Exercise  Exercise duration: 0., 02/11/2020  Home/Environment  Lives with Son., 02/11/2020  Nutrition/Health  Regular, 02/11/2020  Substance Use - Denies Substance Abuse, 10/12/2013  Never, 05/20/2015  Tobacco - Denies Tobacco Use, 10/12/2013  Never (less than 100 in lifetime), No, 07/19/2020  Former smoker, quit more than 30 days ago, N/A, 02/11/2020  Family History  Alzheimers disease: Sister and Sister.  Cancer: Father.  DIABETES MELLITUS: Mother and Sister.  Hypertension.: Mother and Sister.  Stroke: Brother.  Immunizations  Vaccine Date Status   pneumococcal 23-polyvalent vaccine 09/10/2018 Given   tetanus/diphtheria/pertussis, acel(Tdap) 11/21/2016 Given   pneumococcal vacc 10/08/2008 Recorded   Lab Results  Labs Last 24 Hours?  ?Chemistry? Hematology/Coagulation? Blood Gases?   Sodium Lvl: 136 mmol/L (07/19/20 16:43:00) PT: 13 second(s) (07/19/20 16:43:00) Sample ABG: ARTERIAL (07/19/20 17:20:00)   Potassium Lvl: 3.6 mmol/L (07/19/20 16:43:00) INR: 1.02 (07/19/20 16:43:00) Treatment: ROOM AIR (07/19/20 17:20:00)   Chloride: 104 mmol/L (07/19/20 16:43:00) PTT: 30.2 second(s) (07/19/20 16:43:00) Site: Radial Rt (07/19/20 17:20:00)   CO2: 26 mmol/L (07/19/20 16:43:00) D-Dimer:?0.8 mcg/ml FEU?High (07/19/20 16:43:00) pH Art:?7.48?High (07/19/20 17:20:00)   Calcium Lvl: 8.8 mg/dL (07/19/20 16:43:00) WBC: 5.5 x10(3)/mcL (07/19/20 16:43:00) pCO2 Art: 36 mmHg (07/19/20 17:20:00)   Magnesium Lvl: 2.2 mg/dL (07/19/20 16:43:00) RBC: 4.97 x10(6)/mcL (07/19/20 16:43:00) pO2 Art:?61 mmHg?Low (07/19/20 17:20:00)   Glucose Lvl:?108 mg/dL?High (07/19/20 16:43:00) Hgb: 13 gm/dL (07/19/20 16:43:00) HCO3 Art:?26.8 mmol/L?High (07/19/20 17:20:00)   BUN: 13 mg/dL (07/19/20 16:43:00) Hct: 41.3 % (07/19/20 16:43:00) CO2 Totl Art:?27.9 mmol/L?High (07/19/20 17:20:00)   Creatinine: 0.8  mg/dL (20 16:43:00) Platelet: 236 x10(3)/mcL (20 16:43:00) O2 Sat Art: 93.9 % (20 17:20:00)   eGFR-AA:?89 mL/min?Low (20 16:43:00) MCV: 83.1 fL (20 16:43:00) D base:?3.4?High (20 17:20:00)   eGFR-LAURA:?74 mL/min?Low (20 16:43:00) MCH: 26.2 pg (20 16:43:00) THB AB.8 gm/dL (20:20:00)   Bili Total: 0.6 mg/dL (20 16:43:00) MCHC: 31.5 gm/dL (20 16:43:00) CO Hgb: 1.5 % (20:20:00)   Bili Direct: 0.2 mg/dL (20 16:43:00) RDW: 13 % (20 16:43:00) Met Hgb Art: 0.8 % (20:20:00)   Bili Indirect: 0.4 mg/dL (20 16:43:00) MPV:?10.8 fL?High (20 16:43:00) O2 Hgb:?91.7 %?Low (20:20:00)   AST:?48 unit/L?High (20 16:43:00) Abs Neut: 3.73 x10(3)/mcL (20 16:43:00) Allens: Positive (20 17:20:00)   ALT: 52 unit/L (20 16:43:00) Neutro Auto: 68 % (20 16:43:00)    Alk Phos:?35 unit/L?Low (20 16:43:00) Lymph Auto: 25 % (20 16:43:00)    Total Protein:?8.4 gm/dL?High (20 16:43:00) Mono Auto: 6 % (20 16:43:00)    Albumin Lvl:?3.1 gm/dL?Low (20 16:43:00) Basophil Auto: 0 % (20 16:43:00)    Globulin:?5.3 gm/mL?High (20 16:43:00) Abs Neutro: 3.73 x10(3)/mcL (20 16:43:00)    A/G Ratio:?0.6 ratio?Low (20 16:43:00) Abs Lymph: 1.4 x10(3)/mcL (20 16:43:00)    Phosphorus: 2.5 mg/dL (20 16:43:00) Abs Mono: 0.3 x10(3)/mcL (20 16:43:00)    LDH:?369 unit/L?High (20 16:43:00) Abs Baso: 0 x10(3)/mcL (20 16:43:00)    NT pro BNP.: 102 pg/mL (20 16:43:00) IG%: 0 % (20 16:43:00)    Ferritin Lvl:?616.8 ng/mL?High (20 16:43:00) IG#: 0.03 (20 16:43:00)    Lactic Acid Lvl: 1.1 mmol/L (20 16:43:00) Sed Rate:?48 mm/hr?High (20 16:43:00)    CRP:?2.2 mg/dL?High (20 16:43:00)     Total CK:?449 unit/L?High (20 16:43:00)     CK MB: 2.8 ng/mL (20 16:43:00)     Troponin-I: <0.015 (20  16:43:00)     Diagnostic Results  Chest x-ray by my read shows midline airway, with no appreciated bony abnormalities, cardiac silhouette appears relatively normal but difficult to fully appreciate given poor penetration of chest x-ray likely due to body habitus. ?Diffuse interstitial markings that stop?before?periphery and some hilar congestion, but once again limited by poor penetration chest x-ray. ?Visualized the costophrenic angles with no obvious effusions.? Difficult to appreciate any?pulmonary infiltrates.  ?      I have performed a history and physical examination of the patient and discussed the management with the resident.  ???  [x ] I reviewed the residents note and agree with the documented findings and plan of care.  [ ] I reviewed the residents note and agree with the documented findings and plan of care except:  ?   Please see Progress Note for 7/20 for full details  Essentially 76 yo female with complex medical history admitted with new complaint of worsening shortness of breath.? COVID positive and no known contacts.? D dimer mildly? elevated.? Ferritin ~600.  Will plan for supplemental 02, start remdesivir (FDA fact sheet reviewed with patient, risks vs benefits discussed and patient in agreement to start).? Plan to continue supportive care.? Dexamethasone plus anticoagulation and empiric antimicrobials.?  ?   MD Daisy  ID Staff

## 2022-05-01 NOTE — HISTORICAL [facility]
This is a historical note converted from [employer]. Formatting and pictures may have been removed.  Please reference [employer] for original formatting and attached multimedia. Admit and Discharge Dates  Admit Date: 07/19/2020  Discharge Date: 07/24/2020  Physicians  Attending Physician - Nargis FLORES, Fidel Ribera  Admitting Physician - Nargis FLORES, Fidel Ribera  Primary Care Physician - Jonathan Apple DO  Discharge Diagnosis  COVID 19 virus infection  Gastroenteritis  HTN  HLD  DM2  Diastolic mitral dysfunction  EF of 55-60% on echo 5/6/2020  ?  Surgical Procedures  No procedures recorded for this visit.  Immunizations  No immunizations recorded for this visit.  Admission Information  This patient is a 75 year old female with a PMHx of T2DM, hypertension, hyperlipidemia, and diastolic dysfunction (see echo report on 5/6/20, EF 55-60%). ?See H&P from 7/19/20 for full details. ?She presented to the ED with increased SOB and chest pain with associated weakness and fatigue along with nausea, vomiting and diarrhea with reported 4-5 nonbloody watery bowel movements that are foul smelling. ?Of note she had tested positive for COVID on 7/11/20. ?In the ED she was initially stable on room air but desatted with exertion and speaking to 80s. ? ?She was admitted for monitoring and management of respiratory distress due to COVID-19 pneumonitis.  ?  Hospital Course  Patient was admitted for COVID-19 pneumonitis on 7/19/2020. ?X-ray on admission showed increased interstitial and pulmonary vascular markings. ?Patient was started on Rocephin and azithromycin for antibiotic coverage. ?Patient was also placed on steroids with dexamethasone 4 mg twice daily. ?When the oxygen requirement increased, patient was started on Remdesevir, which was completed on day 5. ?Patient was also anticoagulated with full dose Lovenox. ?Stool studies were negative for any other infectious process. ?Gastroenteritis likely secondary to COVID-19 infection. ?Patient was  also diuresed with Lasix to improve oxygenation. ?Patient was finally weaned down to room air. ?Ambulatory saturations was more than 95% on room air. ?Case management was consulted for home health. ?Patient was stable and did not have any acute complaints this morning. ?Patient was discharged on Xarelto 10 mg daily for 28 days, Medrol Dosepak, azithromycin Dosepak. ?Patient is being discharged to home with home health. ?For hypertension, patient was advised to continue amlodipine 10 mg, Lasix 20 mg, lisinopril 20 mg and metoprolol succinate 25 mg daily. Continue home atorvastatin 80 mg daily for hyperlipidemia. ?Patient was advised to continue current diabetic home regimen. ?Patient was advised to follow-up with primary care physician to further titrate her diabetic medications. ?Advised to check CBGs daily. ?Patient will be following up with internal medicine for post-wards in 3 weeks.  ?  Significant Findings  In chart  Time Spent on discharge  >30min  Objective  Vitals & Measurements  T:?36.6? ?C (Oral)? TMIN:?36.6? ?C (Oral)? TMAX:?37? ?C (Oral)? HR:?70(Peripheral)? RR:?20? BP:?162/92? SpO2:?98%?  Physical Exam  General: Alert. Responsive. Not in?acute distress. Afebrile.  HEENT: Normocephalic, Atraumatic, No scleral icterus,.  Neck: No JVD  Pulm: CTAB. No wheezes or crackles. symmetrical chest expansion.  Cardio:?RRR. no appreciable murmurs/gallops.  Abdomen: BS+, soft, non-distended, non-tender  Extremity:? no LE edema. good equal pulses felt bilaterally in all extremities.  Neurologic: alert and oriented x 3.? Responds to questions/commands appropriately.  MSK: No obvious deformities. Moving all extremities purposefully.  Skin: Warm, dry and without rashes.  Patient Discharge Condition  Patient is clinically and hemodynamically stable for discharge.?  Discharge Disposition  Discharge to home.?  Follow-up with?internal?medicine for?post wards?clinic in 3 weeks.  ?   Discharge Medication  Reconciliation  Prescribed  azithromycin (Azithromycin 5 Day Dose Pack 250 mg oral tablet)?1 packet(s), Oral, Daily  methylPREDNISolone (Medrol Dosepak 4 mg oral tablet)?1 tab(s), Oral, Daily  rivaroxaban (Xarelto 10mg Tablet)?10 mg, Oral, Daily  Continue  Misc Prescription (blood pressure cuff)?See Instructions  Misc Prescription (cane straight)?See Instructions  Misc Prescription (glucometer)?See Instructions  Misc Prescription (glucometer)?See Instructions  Misc Prescription (glucometer)?See Instructions  Misc Prescription (glucometer)?See Instructions  Misc Prescription (lancets, strips, alcohol swabs)?See Instructions  amLODIPine (Norvasc 10 mg oral tablet)?10 mg, Oral, Daily  atorvastatin (Lipitor 80 mg oral tablet)?80 mg, Oral, Daily  fluticasone nasal (Flonase 50 mcg/inh nasal spray)?1 spray(s), Nasal, BID  furosemide (Lasix 20 mg oral tablet)?20 mg, Oral, Daily  gabapentin (gabapentin 600 mg oral tablet)?600 mg, Oral, TID  lisinopril (lisinopril 20 mg oral tablet)?20 mg, Oral, Daily  metFORMIN (metformin 500 mg oral tablet)?500 mg, Oral, BID  metoprolol (Metoprolol Succinate ER 25 mg oral tablet extended release)?25 mg, Oral, Daily  Discontinue  latanoprost ophthalmic (latanoprost 0.005% ophthalmic solution)  Education and Orders Provided  Airborne Precautions, Easy-to-Read  Droplet Precautions, Easy-to-Read  Contact Precautions, Easy-to-Read  Understanding CoronaVirus Disease 2019 (Custom)  Discharge - 07/24/20 10:01:00 CDT, Home, discharge after patient gets meds to bed. Thank you.?  Follow up  Report to Emergency Department if symptoms return or worsen  Follow up with Internal medicine clinic for post wards visit in 3 weeks.  Follow up with PCP as scheduled.      I was present with the Resident during discharge day management.  ???  [ x] I discussed the case with the Resident and agree with the discharge plan as above.  [ ] I discussed the case with the Resident and agree with the discharge plan as above  except:  ???  Time spent on discharge [45 ] minutes

## 2022-05-23 DIAGNOSIS — R52 PAIN: Primary | ICD-10-CM

## 2022-05-23 RX ORDER — HYDROCODONE BITARTRATE AND ACETAMINOPHEN 7.5; 325 MG/1; MG/1
1 TABLET ORAL EVERY 4 HOURS PRN
COMMUNITY
Start: 2022-04-24 | End: 2022-05-31 | Stop reason: SDUPTHER

## 2022-05-24 ENCOUNTER — APPOINTMENT (OUTPATIENT)
Dept: LAB | Facility: HOSPITAL | Age: 77
End: 2022-05-24
Attending: INTERNAL MEDICINE
Payer: COMMERCIAL

## 2022-05-24 DIAGNOSIS — I10 HYPERTENSION, UNSPECIFIED TYPE: Primary | ICD-10-CM

## 2022-05-24 DIAGNOSIS — I73.9 CLAUDICATION: ICD-10-CM

## 2022-05-24 DIAGNOSIS — M48.062 LUMBAR STENOSIS WITH NEUROGENIC CLAUDICATION: ICD-10-CM

## 2022-05-24 DIAGNOSIS — E11.9 DM (DIABETES MELLITUS): ICD-10-CM

## 2022-05-24 LAB
CREAT UR-MCNC: 98.2 MG/DL (ref 47–110)
CREAT UR-MCNC: 99.1 MG/DL (ref 47–110)
MICROALBUMIN UR-MCNC: 43.5 UG/ML
MICROALBUMIN/CREAT RATIO PNL UR: 43.9 MG/GM CR (ref 0–30)
PROT UR STRIP-MCNC: 14.5 MG/DL
PROT/CREAT UR-RTO: 147.7 MG/GM CR

## 2022-05-24 PROCEDURE — 82043 UR ALBUMIN QUANTITATIVE: CPT

## 2022-05-24 PROCEDURE — 82570 ASSAY OF URINE CREATININE: CPT

## 2022-05-24 RX ORDER — HYDROCODONE BITARTRATE AND ACETAMINOPHEN 7.5; 325 MG/1; MG/1
1 TABLET ORAL EVERY 4 HOURS PRN
Qty: 60 TABLET | Refills: 0 | OUTPATIENT
Start: 2022-05-24

## 2022-05-27 RX ORDER — IBUPROFEN 600 MG/1
TABLET ORAL
COMMUNITY
Start: 2022-03-25

## 2022-05-27 RX ORDER — AMLODIPINE BESYLATE 10 MG/1
5 TABLET ORAL DAILY
COMMUNITY
Start: 2022-04-05 | End: 2022-05-31 | Stop reason: SDUPTHER

## 2022-05-27 RX ORDER — ATORVASTATIN CALCIUM 40 MG/1
40 TABLET, FILM COATED ORAL DAILY
COMMUNITY
Start: 2022-05-05 | End: 2022-05-31 | Stop reason: SDUPTHER

## 2022-05-27 RX ORDER — TRIAMTERENE AND HYDROCHLOROTHIAZIDE 75; 50 MG/1; MG/1
1 TABLET ORAL DAILY
COMMUNITY
Start: 2022-04-09 | End: 2022-05-31 | Stop reason: SDUPTHER

## 2022-05-27 RX ORDER — METFORMIN HYDROCHLORIDE 500 MG/1
500 TABLET ORAL 2 TIMES DAILY
COMMUNITY
Start: 2022-05-05 | End: 2022-05-31 | Stop reason: SDUPTHER

## 2022-05-31 ENCOUNTER — CLINICAL SUPPORT (OUTPATIENT)
Dept: INTERNAL MEDICINE | Facility: CLINIC | Age: 77
End: 2022-05-31
Attending: INTERNAL MEDICINE
Payer: COMMERCIAL

## 2022-05-31 ENCOUNTER — OFFICE VISIT (OUTPATIENT)
Dept: INTERNAL MEDICINE | Facility: CLINIC | Age: 77
End: 2022-05-31
Payer: COMMERCIAL

## 2022-05-31 VITALS
OXYGEN SATURATION: 96 % | HEIGHT: 65 IN | WEIGHT: 208.75 LBS | BODY MASS INDEX: 34.78 KG/M2 | DIASTOLIC BLOOD PRESSURE: 84 MMHG | TEMPERATURE: 98 F | RESPIRATION RATE: 18 BRPM | SYSTOLIC BLOOD PRESSURE: 150 MMHG | HEART RATE: 82 BPM

## 2022-05-31 DIAGNOSIS — E11.9 TYPE 2 DIABETES MELLITUS WITHOUT COMPLICATION, WITHOUT LONG-TERM CURRENT USE OF INSULIN: ICD-10-CM

## 2022-05-31 DIAGNOSIS — E78.5 HYPERLIPIDEMIA, UNSPECIFIED HYPERLIPIDEMIA TYPE: ICD-10-CM

## 2022-05-31 DIAGNOSIS — Z11.59 NEED FOR HEPATITIS C SCREENING TEST: ICD-10-CM

## 2022-05-31 DIAGNOSIS — M48.062 SPINAL STENOSIS OF LUMBAR REGION WITH NEUROGENIC CLAUDICATION: ICD-10-CM

## 2022-05-31 DIAGNOSIS — I10 PRIMARY HYPERTENSION: Primary | ICD-10-CM

## 2022-05-31 PROBLEM — M48.061 SPINAL STENOSIS OF LUMBAR REGION: Status: ACTIVE | Noted: 2022-05-31

## 2022-05-31 PROCEDURE — 99213 OFFICE O/P EST LOW 20 MIN: CPT | Mod: PBBFAC

## 2022-05-31 PROCEDURE — 92228 IMG RTA DETC/MNTR DS PHY/QHP: CPT | Mod: PBBFAC

## 2022-05-31 PROCEDURE — 2025F 7 FLD RTA PHOTO W/O RTNOPTHY: CPT | Mod: PBBFAC,CPTII | Performed by: INTERNAL MEDICINE

## 2022-05-31 RX ORDER — HYDROCODONE BITARTRATE AND ACETAMINOPHEN 7.5; 325 MG/1; MG/1
1 TABLET ORAL EVERY 4 HOURS PRN
Qty: 60 TABLET | Refills: 0 | Status: SHIPPED | OUTPATIENT
Start: 2022-05-31 | End: 2022-07-25 | Stop reason: SDUPTHER

## 2022-05-31 RX ORDER — AMLODIPINE BESYLATE 10 MG/1
5 TABLET ORAL DAILY
Qty: 30 TABLET | Refills: 4 | Status: SHIPPED | OUTPATIENT
Start: 2022-05-31 | End: 2022-09-02 | Stop reason: SDUPTHER

## 2022-05-31 RX ORDER — TRIAMTERENE AND HYDROCHLOROTHIAZIDE 75; 50 MG/1; MG/1
1 TABLET ORAL DAILY
Qty: 30 TABLET | Refills: 4 | Status: SHIPPED | OUTPATIENT
Start: 2022-05-31 | End: 2022-09-02 | Stop reason: SDUPTHER

## 2022-05-31 RX ORDER — HYDROCODONE BITARTRATE AND ACETAMINOPHEN 7.5; 325 MG/1; MG/1
1 TABLET ORAL EVERY 4 HOURS PRN
Qty: 30 TABLET | Refills: 0 | Status: SHIPPED | OUTPATIENT
Start: 2022-05-31 | End: 2022-05-31

## 2022-05-31 RX ORDER — ATORVASTATIN CALCIUM 40 MG/1
40 TABLET, FILM COATED ORAL DAILY
Qty: 30 TABLET | Refills: 4 | Status: SHIPPED | OUTPATIENT
Start: 2022-05-31 | End: 2022-09-02 | Stop reason: SDUPTHER

## 2022-05-31 RX ORDER — METFORMIN HYDROCHLORIDE 500 MG/1
500 TABLET ORAL 2 TIMES DAILY
Qty: 30 TABLET | Refills: 4 | Status: SHIPPED | OUTPATIENT
Start: 2022-05-31 | End: 2022-09-02 | Stop reason: SDUPTHER

## 2022-05-31 NOTE — PROGRESS NOTES
[facility]  Internal Medicine Clinic    Chief Complaint  Follow up    HPI  Charlotte Choi is a 76 y.o. female who presents to clinic today for follow-up of chronic medical conditions. Continues to have chronic lower extremity back pain with claudication of left lower extremity. She states that she is now having to use rolling walker more frequently. She denies any worsening numbness or tingling. She denies urinary or bowel insentience.  She denies any loss of sensation. Additionally, she continues to have chronic stable dyspnea. No chest pain on exertion.     ROS  Review of Systems   Constitutional: Negative for chills and fever.   HENT: Negative.    Eyes: Negative for blurred vision and double vision.   Respiratory: Negative for cough and shortness of breath.    Cardiovascular: Negative for chest pain, palpitations and leg swelling.   Gastrointestinal: Negative for abdominal pain, constipation, diarrhea, nausea and vomiting.   Skin: Negative for rash.   Neurological: Negative for weakness and headaches.        PE  Vitals:    05/31/22 0927   BP: (!) 150/84   Pulse: 82   Resp: 18   Temp: 98.2 °F (36.8 °C)      Physical Exam  Vitals and nursing note reviewed.   Constitutional:       Appearance: Normal appearance.   HENT:      Head: Normocephalic and atraumatic.   Eyes:      Extraocular Movements: Extraocular movements intact.      Conjunctiva/sclera: Conjunctivae normal.      Pupils: Pupils are equal, round, and reactive to light.   Cardiovascular:      Rate and Rhythm: Normal rate and regular rhythm.      Pulses: Normal pulses.      Heart sounds: Normal heart sounds.   Pulmonary:      Effort: Pulmonary effort is normal.      Breath sounds: Normal breath sounds.   Abdominal:      General: Bowel sounds are normal.   Musculoskeletal:      Cervical back: Normal range of motion and neck supple.   Skin:     General: Skin is warm and dry.   Neurological:      Mental Status: She is alert and oriented to person, place, and  time.          Assessment/Plan  1.Type 2 Diabetes  -A1c 6.8 as of 1/2022  -Continue Metformin and monitor CBG  Diabetic Foot Exam:   - Current Complaints: No foot complaints  - Nails: Normal   - Foot Deformities: None    - Amputations: None    - Pedal Pulses: +2 bilaterally    - Dorsalis pedis: +2 bilaterally     - Posterior tibial: +2 bilaterally  - Skin Condition: Warm and dry bilaterally  - Sensory Exam: Intact bilaterally  - Risk: None  - Education: DM foot care education provided and instructed patient to avoid going outside barefooted    2.Primary Hypertension  -150/84 (did not take medications this morning)  -Continue Norvasc 10, HCTZ-triamterene, Lisinopril, and metoprolol    3.Lumbar Stenosis with neurogenic claudication  -Evaluated by Neurosurgery but on pain control with NORCO  -She does not wish to be referred to a neurosurgeon for additional evaluation  -Was referred to PT previously. Insurance pending  -Not responsive to Lyrica or Gabapentin    4.Hyperlipidemia  -Repeat FLP  -Continue Lipitor 40    Health Maintenance:    Immunization History   Administered Date(s) Administered    COVID-19, MRNA, LN-S, PF (Pfizer) (Purple Cap) 01/29/2021, 02/22/2021, 12/03/2021    Influenza - Quadrivalent 11/17/2020    Influenza - Quadrivalent - High Dose - PF (65 years and older) 10/19/2021    Tdap 11/21/2016    Zoster Recombinant 10/19/2021, 02/07/2022       Return to clinic in 3 months.   Fasting labs  Follow up Diabetic eye    Pawan Oneil DO  [employer] [facility] PGY 1

## 2022-06-03 RX ORDER — METOPROLOL SUCCINATE 25 MG/1
25 TABLET, EXTENDED RELEASE ORAL DAILY
COMMUNITY
Start: 2022-02-07 | End: 2022-06-03

## 2022-06-03 RX ORDER — METOPROLOL SUCCINATE 25 MG/1
25 TABLET, EXTENDED RELEASE ORAL DAILY
Qty: 30 TABLET | Refills: 3 | Status: SHIPPED | OUTPATIENT
Start: 2022-06-03 | End: 2022-09-02 | Stop reason: SDUPTHER

## 2022-06-15 NOTE — PROGRESS NOTES
Charlotte Choi is a 76 y.o. female here for a diabetic eye screening with non-dilated fundus photos per Dr Oneil.    Patient cooperative?: Yes  Small pupils?: No  Last eye exam: unknown    For exam results, see Encounter Report.

## 2022-07-04 ENCOUNTER — HOSPITAL ENCOUNTER (EMERGENCY)
Facility: HOSPITAL | Age: 77
Discharge: HOME | End: 2022-07-04
Attending: FAMILY MEDICINE
Payer: COMMERCIAL

## 2022-07-04 VITALS
TEMPERATURE: 99 F | OXYGEN SATURATION: 94 % | BODY MASS INDEX: 35.85 KG/M2 | HEART RATE: 102 BPM | DIASTOLIC BLOOD PRESSURE: 71 MMHG | RESPIRATION RATE: 20 BRPM | HEIGHT: 64 IN | WEIGHT: 210 LBS | SYSTOLIC BLOOD PRESSURE: 127 MMHG

## 2022-07-04 DIAGNOSIS — U07.1 COVID-19: ICD-10-CM

## 2022-07-04 DIAGNOSIS — B34.9 VIRAL ILLNESS: Primary | ICD-10-CM

## 2022-07-04 DIAGNOSIS — R07.9 CHEST PAIN AT REST: ICD-10-CM

## 2022-07-04 DIAGNOSIS — U07.1 COVID-19 VIRUS DETECTED: ICD-10-CM

## 2022-07-04 LAB
ALBUMIN SERPL-MCNC: 3.8 GM/DL (ref 3.4–4.8)
ALBUMIN/GLOB SERPL: 1 RATIO (ref 1.1–2)
ALP SERPL-CCNC: 55 UNIT/L (ref 40–150)
ALT SERPL-CCNC: 31 UNIT/L (ref 0–55)
APPEARANCE UR: CLEAR
AST SERPL-CCNC: 25 UNIT/L (ref 5–34)
BACTERIA #/AREA URNS AUTO: ABNORMAL /HPF
BASOPHILS # BLD AUTO: 0.05 X10(3)/MCL (ref 0–0.2)
BASOPHILS NFR BLD AUTO: 0.6 %
BILIRUB UR QL STRIP.AUTO: NEGATIVE MG/DL
BILIRUBIN DIRECT+TOT PNL SERPL-MCNC: 0.4 MG/DL
BUN SERPL-MCNC: 12.8 MG/DL (ref 9.8–20.1)
CALCIUM SERPL-MCNC: 9.8 MG/DL (ref 8.4–10.2)
CHLORIDE SERPL-SCNC: 102 MMOL/L (ref 98–107)
CO2 SERPL-SCNC: 32 MMOL/L (ref 23–31)
COLOR UR AUTO: YELLOW
CREAT SERPL-MCNC: 0.91 MG/DL (ref 0.55–1.02)
EOSINOPHIL # BLD AUTO: 0.14 X10(3)/MCL (ref 0–0.9)
EOSINOPHIL NFR BLD AUTO: 1.7 %
ERYTHROCYTE [DISTWIDTH] IN BLOOD BY AUTOMATED COUNT: 13.2 % (ref 11.5–17)
GLOBULIN SER-MCNC: 4 GM/DL (ref 2.4–3.5)
GLUCOSE SERPL-MCNC: 150 MG/DL (ref 82–115)
GLUCOSE UR QL STRIP.AUTO: NORMAL MG/DL
HCT VFR BLD AUTO: 44.4 % (ref 37–47)
HGB BLD-MCNC: 13.6 GM/DL (ref 12–16)
HYALINE CASTS #/AREA URNS LPF: ABNORMAL /LPF
IMM GRANULOCYTES # BLD AUTO: 0.03 X10(3)/MCL (ref 0–0.04)
IMM GRANULOCYTES NFR BLD AUTO: 0.4 %
KETONES UR QL STRIP.AUTO: ABNORMAL MG/DL
LEUKOCYTE ESTERASE UR QL STRIP.AUTO: NEGATIVE UNIT/L
LYMPHOCYTES # BLD AUTO: 1.63 X10(3)/MCL (ref 0.6–4.6)
LYMPHOCYTES NFR BLD AUTO: 20.3 %
MCH RBC QN AUTO: 26.1 PG (ref 27–31)
MCHC RBC AUTO-ENTMCNC: 30.6 MG/DL (ref 33–36)
MCV RBC AUTO: 85.1 FL (ref 80–94)
MONOCYTES # BLD AUTO: 0.8 X10(3)/MCL (ref 0.1–1.3)
MONOCYTES NFR BLD AUTO: 10 %
MUCOUS THREADS URNS QL MICRO: ABNORMAL /LPF
NEUTROPHILS # BLD AUTO: 5.4 X10(3)/MCL (ref 2.1–9.2)
NEUTROPHILS NFR BLD AUTO: 67 %
NITRITE UR QL STRIP.AUTO: NEGATIVE
NRBC BLD AUTO-RTO: 0 %
PH UR STRIP.AUTO: 6 [PH]
PLATELET # BLD AUTO: 267 X10(3)/MCL (ref 130–400)
PMV BLD AUTO: 10.8 FL (ref 7.4–10.4)
POTASSIUM SERPL-SCNC: 3.8 MMOL/L (ref 3.5–5.1)
PROT SERPL-MCNC: 7.8 GM/DL (ref 5.8–7.6)
PROT UR QL STRIP.AUTO: ABNORMAL MG/DL
RBC # BLD AUTO: 5.22 X10(6)/MCL (ref 4.2–5.4)
RBC #/AREA URNS AUTO: ABNORMAL /HPF
RBC UR QL AUTO: NEGATIVE UNIT/L
SARS-COV-2 RDRP RESP QL NAA+PROBE: POSITIVE
SODIUM SERPL-SCNC: 141 MMOL/L (ref 136–145)
SP GR UR STRIP.AUTO: 1.03
SQUAMOUS #/AREA URNS LPF: ABNORMAL /HPF
UROBILINOGEN UR STRIP-ACNC: NORMAL MG/DL
WBC # SPEC AUTO: 8 X10(3)/MCL (ref 4.5–11.5)
WBC #/AREA URNS AUTO: ABNORMAL /HPF

## 2022-07-04 PROCEDURE — 99285 EMERGENCY DEPT VISIT HI MDM: CPT | Mod: 25

## 2022-07-04 PROCEDURE — 96360 HYDRATION IV INFUSION INIT: CPT

## 2022-07-04 PROCEDURE — 36415 COLL VENOUS BLD VENIPUNCTURE: CPT | Performed by: FAMILY MEDICINE

## 2022-07-04 PROCEDURE — 87635 SARS-COV-2 COVID-19 AMP PRB: CPT | Performed by: FAMILY MEDICINE

## 2022-07-04 PROCEDURE — 81001 URINALYSIS AUTO W/SCOPE: CPT | Performed by: FAMILY MEDICINE

## 2022-07-04 PROCEDURE — 80053 COMPREHEN METABOLIC PANEL: CPT | Performed by: FAMILY MEDICINE

## 2022-07-04 PROCEDURE — 93005 ELECTROCARDIOGRAM TRACING: CPT

## 2022-07-04 PROCEDURE — 25000003 PHARM REV CODE 250: Performed by: FAMILY MEDICINE

## 2022-07-04 PROCEDURE — 85025 COMPLETE CBC W/AUTO DIFF WBC: CPT | Performed by: FAMILY MEDICINE

## 2022-07-04 RX ORDER — AMLODIPINE BESYLATE 5 MG/1
5 TABLET ORAL
Status: COMPLETED | OUTPATIENT
Start: 2022-07-04 | End: 2022-07-04

## 2022-07-04 RX ORDER — BENZONATATE 100 MG/1
100 CAPSULE ORAL 3 TIMES DAILY PRN
Qty: 20 CAPSULE | Refills: 0 | Status: SHIPPED | OUTPATIENT
Start: 2022-07-04 | End: 2022-07-25 | Stop reason: SDUPTHER

## 2022-07-04 RX ORDER — ACETAMINOPHEN 325 MG/1
650 TABLET ORAL
Status: COMPLETED | OUTPATIENT
Start: 2022-07-04 | End: 2022-07-04

## 2022-07-04 RX ORDER — METOPROLOL TARTRATE 25 MG/1
25 TABLET, FILM COATED ORAL
Status: COMPLETED | OUTPATIENT
Start: 2022-07-04 | End: 2022-07-04

## 2022-07-04 RX ORDER — FLUTICASONE PROPIONATE 50 MCG
2 SPRAY, SUSPENSION (ML) NASAL DAILY
Qty: 15 G | Refills: 0 | Status: SHIPPED | OUTPATIENT
Start: 2022-07-04 | End: 2023-09-20

## 2022-07-04 RX ORDER — AZITHROMYCIN 250 MG/1
TABLET, FILM COATED ORAL
Qty: 6 TABLET | Refills: 0 | Status: SHIPPED | OUTPATIENT
Start: 2022-07-04 | End: 2022-11-14

## 2022-07-04 RX ORDER — ONDANSETRON 4 MG/1
4 TABLET, ORALLY DISINTEGRATING ORAL EVERY 6 HOURS PRN
Qty: 10 TABLET | Refills: 0 | Status: SHIPPED | OUTPATIENT
Start: 2022-07-04 | End: 2022-07-09

## 2022-07-04 RX ADMIN — METOPROLOL TARTRATE 25 MG: 25 TABLET, FILM COATED ORAL at 05:07

## 2022-07-04 RX ADMIN — SODIUM CHLORIDE 1000 ML: 9 INJECTION, SOLUTION INTRAVENOUS at 03:07

## 2022-07-04 RX ADMIN — AMLODIPINE BESYLATE 5 MG: 5 TABLET ORAL at 05:07

## 2022-07-04 RX ADMIN — ACETAMINOPHEN 650 MG: 325 TABLET ORAL at 04:07

## 2022-07-04 NOTE — ED PROVIDER NOTES
Encounter Date: 7/4/2022       History     Chief Complaint   Patient presents with    Generalized Body Aches     Pt in with complaints of body aches with cough and congestion that started yesterday.     76-year-old female presents emergency room with complaints of body aches, cough, congestion which began yesterday, and worsened today.  No abdominal pain.  No nausea or vomiting.  No chest pain or shortness of breath.  Rest improves symptoms, worse with activity and coughing.  Patient reports symptoms currently as being moderate.        Review of patient's allergies indicates:  No Known Allergies  Past Medical History:   Diagnosis Date    Diabetes mellitus     Hypertension      History reviewed. No pertinent surgical history.  Family History   Problem Relation Age of Onset    Diabetes Mother     Hypertension Mother     Mental retardation Mother     Cancer Father     Mental illness Sister     Hypertension Sister     Diabetes Brother     Stroke Brother      Social History     Tobacco Use    Smoking status: Former Smoker    Smokeless tobacco: Former User   Substance Use Topics    Alcohol use: Not Currently    Drug use: Not Currently     Review of Systems   Constitutional: Negative for chills, fatigue and fever.   HENT: Positive for congestion, rhinorrhea and sore throat. Negative for ear pain.    Eyes: Negative for photophobia and pain.   Respiratory: Positive for cough. Negative for shortness of breath and wheezing.    Cardiovascular: Negative for chest pain.   Gastrointestinal: Negative for abdominal pain, diarrhea, nausea and vomiting.   Genitourinary: Negative for dysuria.   Musculoskeletal: Positive for myalgias.   Neurological: Negative for dizziness, weakness and headaches.   All other systems reviewed and are negative.      Physical Exam     Initial Vitals [07/04/22 1411]   BP Pulse Resp Temp SpO2   (!) 171/90 108 14 98.8 °F (37.1 °C) 95 %      MAP       --         Physical Exam    Nursing note  and vitals reviewed.  Constitutional: She appears well-developed and well-nourished. No distress.   HENT:   Head: Normocephalic and atraumatic.   Eyes: Conjunctivae and EOM are normal. Pupils are equal, round, and reactive to light.   Neck: Neck supple.   Normal range of motion.  Cardiovascular: Regular rhythm and normal heart sounds.   Tachycardia   Pulmonary/Chest: Breath sounds normal. No respiratory distress. She has no wheezes. She has no rhonchi. She has no rales.   Abdominal: Abdomen is soft. Bowel sounds are normal. She exhibits no distension. There is no abdominal tenderness. There is no rebound and no guarding.   Musculoskeletal:         General: Normal range of motion.      Cervical back: Normal range of motion and neck supple.     Neurological: She is alert and oriented to person, place, and time.   Skin: Skin is warm and dry. Capillary refill takes less than 2 seconds. No erythema.   Psychiatric: She has a normal mood and affect. Her behavior is normal. Judgment and thought content normal.         ED Course   Procedures  Labs Reviewed   COMPREHENSIVE METABOLIC PANEL - Abnormal; Notable for the following components:       Result Value    Carbon Dioxide 32 (*)     Glucose Level 150 (*)     Protein Total 7.8 (*)     Globulin 4.0 (*)     Albumin/Globulin Ratio 1.0 (*)     All other components within normal limits   URINALYSIS, REFLEX TO URINE CULTURE - Abnormal; Notable for the following components:    Protein, UA Trace (*)     Ketones, UA Trace (*)     Squamous Epithelial Cells, UA Few (*)     Mucous, UA Trace (*)     All other components within normal limits   CBC WITH DIFFERENTIAL - Abnormal; Notable for the following components:    MCH 26.1 (*)     MCHC 30.6 (*)     MPV 10.8 (*)     All other components within normal limits   SARS-COV-2 RNA AMPLIFICATION, QUAL - Abnormal; Notable for the following components:    SARS COV-2 MOLECULAR Positive (*)     All other components within normal limits   CBC W/  AUTO DIFFERENTIAL    Narrative:     The following orders were created for panel order CBC Auto Differential.  Procedure                               Abnormality         Status                     ---------                               -----------         ------                     CBC with Differential[[id number]]        Abnormal            Final result                 Please view results for these tests on the individual orders.   EXTRA TUBES    Narrative:     The following orders were created for panel order EXTRA TUBES.  Procedure                               Abnormality         Status                     ---------                               -----------         ------                     Light Blue Top Hold[[id number]]                              In process                 Light Green Top Hold[[id number]]                                                          Please view results for these tests on the individual orders.   LIGHT BLUE TOP HOLD   LIGHT GREEN TOP HOLD     EKG Readings: (Independently Interpreted)   07/04/2022 2:35 PM  Rate: 103  Rhythm Sinus  Interval: Normal  ST Changes: Nonspecific ST and T-wave changes  Impression: Sinus tachycardia with nonspecific ST changes.     ECG Results          EKG 12-lead (In process)  Result time 07/04/22 15:27:52    In process by Interface, Lab In [facility] (07/04/22 15:27:52)                 Narrative:    Test Reason : R07.9,    Vent. Rate : 103 BPM     Atrial Rate : 103 BPM     P-R Int : 140 ms          QRS Dur : 084 ms      QT Int : 322 ms       P-R-T Axes : 064 057 136 degrees     QTc Int : 421 ms    Sinus tachycardia  Nonspecific ST and T wave abnormality  Abnormal ECG  No previous ECGs available    Referred By: [email or web address]   SELF           Confirmed By:                             Imaging Results          X-Ray Chest 1 View (Final result)  Result time 07/04/22 15:01:16    Final result by Andrade Rai MD (07/04/22 15:01:16)                 Impression:       Suspected mild congestive changes.      Electronically signed by: Andrade Rai  Date:    07/04/2022  Time:    15:01             Narrative:    EXAMINATION:  XR CHEST 1 VIEW    CLINICAL HISTORY:  Dyspnea;    TECHNIQUE:  One view    COMPARISON:  December 16, 2000.    FINDINGS:  Cardiopericardial silhouette is within normal limits.  Mild prominence of the lungs interstitial markings is suspected for mild congestive process.  There is no dense consolidation fluid accumulation within the pleural spaces.                                 Medications   sodium chloride 0.9% bolus 1,000 mL (0 mLs Intravenous Stopped 7/4/22 1631)   acetaminophen tablet 650 mg (650 mg Oral Given 7/4/22 1649)   amLODIPine tablet 5 mg (5 mg Oral Given 7/4/22 1700)   metoprolol tartrate (LOPRESSOR) tablet 25 mg (25 mg Oral Given 7/4/22 1700)                 ED Course as of 07/04/22 1707   Mon Jul 04, 2022   1701 Patient currently in no acute distress.  Blood pressure is slightly elevated, currently does not take blood pressure medications morning.  Will give a dose of blood pressure medications here in the emergency room.  ER precautions given for any acute worsening. [MW]   1702 Due to mild congestive changes on CXR, will also place on antibiotics for potential early pneumonia. [MW]      ED Course User Index  [MW] Francisco Syed MD             Clinical Impression:   Final diagnoses:  [R07.9] Chest pain at rest  [B34.9] Viral illness (Primary)  [U07.1] COVID-19          ED Disposition Condition    Discharge Stable        ED Prescriptions     Medication Sig Dispense Start Date End Date Auth. Provider    ondansetron (ZOFRAN-ODT) 4 MG TbDL Take 1 tablet (4 mg total) by mouth every 6 (six) hours as needed (nausea vomiting). 10 tablet 7/4/2022 7/9/2022 Francisco Syed MD    fluticasone propionate (FLONASE) 50 mcg/actuation nasal spray 2 sprays (100 mcg total) by Each Nostril route once daily at 6am. 15 g 7/4/2022  Francisco Syed MD     azithromycin (Z-TAN) 250 MG tablet Take first 2 tablets together, then 1 every day until finished. 6 tablet 7/4/2022  Francisco Syed MD    benzonatate (TESSALON) 100 MG capsule Take 1 capsule (100 mg total) by mouth 3 (three) times daily as needed for Cough. 20 capsule 7/4/2022 7/25/2022 Francisco Syed MD        Follow-up Information     Follow up With Specialties Details Why Contact Info    Pawan Oneil, DO Internal Medicine In 5 days  [address] [facility] [address]  [phone]      [facility] Emergency Dept Emergency Medicine  As needed, If symptoms worsen [address] [facility] [id number] [phone]           Francisco Syed MD  07/04/22 1706       Francisco Syed MD  07/04/22 1704

## 2022-07-25 ENCOUNTER — OFFICE VISIT (OUTPATIENT)
Dept: INTERNAL MEDICINE | Facility: CLINIC | Age: 77
End: 2022-07-25
Payer: COMMERCIAL

## 2022-07-25 VITALS
SYSTOLIC BLOOD PRESSURE: 147 MMHG | BODY MASS INDEX: 35.51 KG/M2 | HEART RATE: 75 BPM | TEMPERATURE: 98 F | DIASTOLIC BLOOD PRESSURE: 80 MMHG | HEIGHT: 64 IN | RESPIRATION RATE: 20 BRPM | WEIGHT: 208 LBS

## 2022-07-25 DIAGNOSIS — M48.062 SPINAL STENOSIS OF LUMBAR REGION WITH NEUROGENIC CLAUDICATION: ICD-10-CM

## 2022-07-25 DIAGNOSIS — I10 HYPERTENSION, UNSPECIFIED TYPE: Primary | ICD-10-CM

## 2022-07-25 DIAGNOSIS — E78.5 HYPERLIPIDEMIA, UNSPECIFIED HYPERLIPIDEMIA TYPE: ICD-10-CM

## 2022-07-25 DIAGNOSIS — U07.1 COVID-19: ICD-10-CM

## 2022-07-25 DIAGNOSIS — E11.9 TYPE 2 DIABETES MELLITUS WITHOUT COMPLICATION, WITHOUT LONG-TERM CURRENT USE OF INSULIN: ICD-10-CM

## 2022-07-25 PROCEDURE — 99213 OFFICE O/P EST LOW 20 MIN: CPT | Mod: PBBFAC

## 2022-07-25 RX ORDER — GABAPENTIN 100 MG/1
100 CAPSULE ORAL 2 TIMES DAILY
Qty: 60 CAPSULE | Refills: 1 | OUTPATIENT
Start: 2022-07-25 | End: 2023-03-15

## 2022-07-25 RX ORDER — HYDROCODONE BITARTRATE AND ACETAMINOPHEN 7.5; 325 MG/1; MG/1
1 TABLET ORAL EVERY 4 HOURS PRN
Qty: 60 TABLET | Refills: 0 | Status: SHIPPED | OUTPATIENT
Start: 2022-07-25 | End: 2022-11-14

## 2022-07-25 RX ORDER — BENZONATATE 100 MG/1
100 CAPSULE ORAL 3 TIMES DAILY PRN
Qty: 20 CAPSULE | Refills: 0 | Status: SHIPPED | OUTPATIENT
Start: 2022-07-25 | End: 2022-08-15

## 2022-07-25 NOTE — PROGRESS NOTES
[facility]  Internal Medicine Clinic    Chief Complaint  Follow up/ED visit for COVID 7/4/22    HPI  hCarlotte Choi is a 77 y.o. female who presents to clinic today for follow-up of chronic medical conditions. Was evaluated in ED on 7/4/22 for COVID 19. Chest Xray done in the ED showed increased interstitial markings without focal consolidations or opacities.  Currently she is doing well without symptoms. She continues to endorse stable chronic lower back pain. She continues to deny symptoms of weakness or incontinence.     ROS  Review of Systems   Constitutional: Negative for chills and fever.   HENT: Negative.    Eyes: Negative for blurred vision and double vision.   Respiratory: Negative for cough and shortness of breath.    Cardiovascular: Negative for chest pain, palpitations and leg swelling.   Gastrointestinal: Negative for abdominal pain, constipation, diarrhea, nausea and vomiting.   Skin: Negative for rash.   Neurological: Negative for weakness and headaches.        PE  There were no vitals filed for this visit.   Physical Exam  Vitals and nursing note reviewed.   Constitutional:       Appearance: Normal appearance.   HENT:      Head: Normocephalic and atraumatic.   Eyes:      Extraocular Movements: Extraocular movements intact.      Conjunctiva/sclera: Conjunctivae normal.      Pupils: Pupils are equal, round, and reactive to light.   Cardiovascular:      Rate and Rhythm: Normal rate and regular rhythm.      Pulses: Normal pulses.      Heart sounds: Normal heart sounds.   Pulmonary:      Effort: Pulmonary effort is normal.      Breath sounds: Normal breath sounds.   Abdominal:      General: Bowel sounds are normal.   Musculoskeletal:      Cervical back: Normal range of motion and neck supple.   Skin:     General: Skin is warm and dry.   Neurological:      Mental Status: She is alert and oriented to person, place, and time.          Assessment/Plan  1.Type 2 Diabetes  -A1c 6.8 as of 1/2022  -Continue  Metformin and monitor CBG  Diabetic Foot Exam:  UTD 5/31/22  Diabetic Eye exam: UTD 5/31/22 - Normal    2.Primary Hypertension  -***(did not take medications this morning)  -Continue Norvasc 10, HCTZ-triamterene, Lisinopril, and metoprolol    3.Lumbar Stenosis with neurogenic claudication  -Evaluated by Neurosurgery but on pain control with NORCO  -She does not wish to be referred to a neurosurgeon for additional evaluation  -Was referred to PT previously. Insurance pending  -Not responsive to Lyrica or Gabapentin    4.Hyperlipidemia  -Continue Lipitor 40    Health Maintenance:    Immunization History   Administered Date(s) Administered    COVID-19, MRNA, LN-S, PF (Pfizer) (Purple Cap) 01/29/2021, 02/22/2021, 12/03/2021    Influenza - Quadrivalent 11/17/2020    Influenza - Quadrivalent - High Dose - PF (65 years and older) 10/19/2021    Tdap 11/21/2016    Zoster Recombinant 10/19/2021, 02/07/2022       Return to clinic in 3 months.     Pawan Oneil DO  [employer] IM PGY 1

## 2022-07-25 NOTE — PROGRESS NOTES
I have reviewed and concur with the resident's history, physical, assessment, and plan.  I have discussed with him all issues related to the diagnosis, workup and treatment plan.Care provided as reasonable and necessary.    Artem Camargo MD  [facility]

## 2022-07-25 NOTE — PROGRESS NOTES
[facility]  Internal Medicine Clinic    Chief Complaint  Follow up/ED visit for COVID 7/4/22    IBRAHIMA Choi is a 77 y.o. female who presents to clinic today for follow-up of chronic medical conditions. Was evaluated in ED on 7/4/22 for COVID 19. Chest Xray done in the ED showed increased interstitial markings without focal consolidations or opacities.  Currently she is only complaining of a minor cough. She continues to endorse stable chronic lower back pain and knee pain. She is now complaining of some weakness in the left lower extremity and states that she is more reliant on her wlker for balance. She does not however have incontinence.     ROS  Review of Systems   Constitutional: Negative for chills and fever.   HENT: Negative.    Eyes: Negative for blurred vision and double vision.   Respiratory: Positive for cough and sputum production. Negative for shortness of breath.    Cardiovascular: Negative for chest pain, palpitations and leg swelling.   Gastrointestinal: Negative for abdominal pain, constipation, diarrhea, nausea and vomiting.   Musculoskeletal: Positive for back pain and joint pain.   Skin: Negative for rash.   Neurological: Positive for weakness. Negative for headaches.        PE  Vitals:    07/25/22 0729   BP: (!) 147/80   Pulse: 75   Resp: 20   Temp: 98.1 °F (36.7 °C)      Physical Exam  Vitals and nursing note reviewed.   Constitutional:       Appearance: Normal appearance.   HENT:      Head: Normocephalic and atraumatic.   Eyes:      Extraocular Movements: Extraocular movements intact.      Conjunctiva/sclera: Conjunctivae normal.      Pupils: Pupils are equal, round, and reactive to light.   Cardiovascular:      Rate and Rhythm: Normal rate and regular rhythm.      Pulses: Normal pulses.      Heart sounds: Normal heart sounds.   Pulmonary:      Effort: Pulmonary effort is normal.      Breath sounds: Normal breath sounds.   Abdominal:      General: Bowel sounds are normal.    Musculoskeletal:      Cervical back: Normal range of motion and neck supple.   Skin:     General: Skin is warm and dry.   Neurological:      Mental Status: She is alert and oriented to person, place, and time.      Motor: No weakness or atrophy.      Deep Tendon Reflexes:      Reflex Scores:       Patellar reflexes are 2+ on the right side and 2+ on the left side.       Achilles reflexes are 2+ on the right side and 2+ on the left side.         Assessment/Plan  1.Type 2 Diabetes  -A1c 6.8 as of 1/2022  -Continue Metformin and monitor CBG  Diabetic Foot Exam:  UTD 5/31/22  Diabetic Eye exam: UTD 5/31/22 - Normal    2.Primary Hypertension  -147/80  -Continue Norvasc 10, HCTZ-triamterene, Lisinopril, and metoprolol    3.Lumbar Stenosis with neurogenic claudication  -Evaluated by Neurosurgery but on pain control with NORCO  -She does not wish to be referred to a neurosurgeon for additional evaluation  -Was referred to PT previously. Insurance pending  -Norco in the morning and Gabapentin at night    4.Hyperlipidemia  -Continue Lipitor 40    5. Post Covid Cough with sputum  - Will closely monitor at subsequent visit  - Will prescribe Tessalon PrivateCore    Health Maintenance:    Immunization History   Administered Date(s) Administered    COVID-19, MRNA, LN-S, PF (Pfizer) (Purple Cap) 01/29/2021, 02/22/2021, 12/03/2021    Influenza - Quadrivalent 11/17/2020    Influenza - Quadrivalent - High Dose - PF (65 years and older) 10/19/2021    Tdap 11/21/2016    Zoster Recombinant 10/19/2021, 02/07/2022       Return to clinic in 2 months.     DO NARESH AllisonU IM PGY 1

## 2022-08-24 LAB
LEFT EYE DM RETINOPATHY: NEGATIVE
RIGHT EYE DM RETINOPATHY: NEGATIVE

## 2022-09-02 RX ORDER — TRIAMTERENE AND HYDROCHLOROTHIAZIDE 75; 50 MG/1; MG/1
1 TABLET ORAL DAILY
Qty: 30 TABLET | Refills: 4 | Status: SHIPPED | OUTPATIENT
Start: 2022-09-02 | End: 2023-04-20 | Stop reason: SDUPTHER

## 2022-09-02 RX ORDER — METOPROLOL SUCCINATE 25 MG/1
25 TABLET, EXTENDED RELEASE ORAL DAILY
Qty: 30 TABLET | Refills: 3 | Status: SHIPPED | OUTPATIENT
Start: 2022-09-02 | End: 2023-02-08 | Stop reason: SDUPTHER

## 2022-09-02 RX ORDER — AMLODIPINE BESYLATE 10 MG/1
5 TABLET ORAL DAILY
Qty: 30 TABLET | Refills: 4 | Status: SHIPPED | OUTPATIENT
Start: 2022-09-02 | End: 2023-06-01 | Stop reason: SDUPTHER

## 2022-09-02 RX ORDER — ATORVASTATIN CALCIUM 40 MG/1
40 TABLET, FILM COATED ORAL DAILY
Qty: 30 TABLET | Refills: 4 | Status: SHIPPED | OUTPATIENT
Start: 2022-09-02 | End: 2023-04-20 | Stop reason: SDUPTHER

## 2022-09-02 RX ORDER — METFORMIN HYDROCHLORIDE 500 MG/1
500 TABLET ORAL 2 TIMES DAILY
Qty: 30 TABLET | Refills: 4 | Status: SHIPPED | OUTPATIENT
Start: 2022-09-02 | End: 2023-02-09 | Stop reason: SDUPTHER

## 2022-09-09 ENCOUNTER — DOCUMENTATION ONLY (OUTPATIENT)
Dept: ADMINISTRATIVE | Facility: HOSPITAL | Age: 77
End: 2022-09-09
Payer: COMMERCIAL

## 2022-09-09 NOTE — PROGRESS NOTES
The following record(s) below  uploaded for Health Maintenance .         HEMOGLOBIN A1c - Overdue  DM EYE EXAM    HEP C SCREENING    LIPID PANEL - Overdue

## 2022-11-14 ENCOUNTER — OFFICE VISIT (OUTPATIENT)
Dept: INTERNAL MEDICINE | Facility: CLINIC | Age: 77
End: 2022-11-14
Payer: MEDICAID

## 2022-11-14 VITALS
OXYGEN SATURATION: 99 % | SYSTOLIC BLOOD PRESSURE: 134 MMHG | BODY MASS INDEX: 36.33 KG/M2 | HEIGHT: 64 IN | DIASTOLIC BLOOD PRESSURE: 78 MMHG | RESPIRATION RATE: 16 BRPM | HEART RATE: 80 BPM | WEIGHT: 212.81 LBS | TEMPERATURE: 98 F

## 2022-11-14 DIAGNOSIS — M25.512 ACUTE PAIN OF BOTH SHOULDERS: ICD-10-CM

## 2022-11-14 DIAGNOSIS — E11.9 TYPE 2 DIABETES MELLITUS WITHOUT COMPLICATION, WITHOUT LONG-TERM CURRENT USE OF INSULIN: Primary | ICD-10-CM

## 2022-11-14 DIAGNOSIS — M54.12 CERVICAL RADICULOPATHY: ICD-10-CM

## 2022-11-14 DIAGNOSIS — I10 HYPERTENSION, UNSPECIFIED TYPE: ICD-10-CM

## 2022-11-14 DIAGNOSIS — M48.062 SPINAL STENOSIS OF LUMBAR REGION WITH NEUROGENIC CLAUDICATION: ICD-10-CM

## 2022-11-14 DIAGNOSIS — M25.511 ACUTE PAIN OF BOTH SHOULDERS: ICD-10-CM

## 2022-11-14 DIAGNOSIS — E78.5 HYPERLIPIDEMIA, UNSPECIFIED HYPERLIPIDEMIA TYPE: ICD-10-CM

## 2022-11-14 LAB — HBA1C MFR BLD: 7.1 %

## 2022-11-14 PROCEDURE — 99215 OFFICE O/P EST HI 40 MIN: CPT | Mod: PBBFAC

## 2022-11-14 PROCEDURE — 83036 HEMOGLOBIN GLYCOSYLATED A1C: CPT | Mod: PBBFAC

## 2022-11-14 PROCEDURE — G0008 ADMIN INFLUENZA VIRUS VAC: HCPCS | Mod: PBBFAC

## 2022-11-14 RX ORDER — HYDROCODONE BITARTRATE AND ACETAMINOPHEN 7.5; 325 MG/1; MG/1
1 TABLET ORAL EVERY 6 HOURS PRN
Qty: 60 TABLET | Refills: 0 | Status: SHIPPED | OUTPATIENT
Start: 2022-11-14 | End: 2023-02-09 | Stop reason: SDUPTHER

## 2022-11-14 NOTE — PROGRESS NOTES
IM Clinic    Chief Complaint  Chief Complaint   Patient presents with    Follow-up     Pt here for 4 month f/u. B/p elevaTed, but pt states that she did not take her meds this morning! pT C/O pain in shoulder and hips. Says that it's been getting worse for a while. She si due for an A!C and Lipid screening. She wants the flu vaccine today.        HPI  Charlotte Choi is a 77 y.o. female who presents to clinic today for follow-up of chronic medical conditions.  Main complaint today is pain in bilateral shoulders that she states began approximately 2 weeks ago.  His that the pain is so severe times that she is unable to perform activities of daily living.  Associated with this is loss of  strength at times the left and numbness/tingling in the distal left fingertips.  She states that she wakes up during the night needs to run hot water over her fingertips to relieve the numbness also states that her left hand is pruritic at times.  States that the right shoulder is also painful but is not comparable to the left.  She has no numbness/tingling of the right upper extremity.  She does admit to lying on her left shoulder at night.      ROS  Review of Systems   Constitutional:  Negative for chills, fever, malaise/fatigue and weight loss.   Respiratory:  Positive for shortness of breath.    Cardiovascular:  Negative for chest pain, leg swelling and PND.   Gastrointestinal:  Negative for abdominal pain, constipation and diarrhea.   Musculoskeletal:  Positive for back pain (Chronic back pain-lumbar) and joint pain (New onset bilateral shoulder pain, chronic right knee pain). Negative for falls and neck pain.   Skin:  Positive for itching.   Neurological:  Positive for tingling and focal weakness (Left upper extremity). Negative for dizziness and headaches.         PE  Vitals:    07/25/22 0729   BP: (!) 147/80   Pulse: 75   Resp: 20   Temp: 98.1 °F (36.7 °C)      Physical Exam  Physical Exam  Vitals and nursing note  reviewed.   Constitutional:       General: She is not in acute distress.     Appearance: Normal appearance. She is normal weight. She is not ill-appearing, toxic-appearing or diaphoretic.   HENT:      Head: Normocephalic and atraumatic.      Mouth/Throat:      Mouth: Mucous membranes are moist.      Pharynx: Oropharynx is clear.   Eyes:      General: No scleral icterus.     Extraocular Movements: Extraocular movements intact.      Conjunctiva/sclera: Conjunctivae normal.      Pupils: Pupils are equal, round, and reactive to light.   Neck:      Comments: Positive Spurling on left and right  Cardiovascular:      Rate and Rhythm: Normal rate and regular rhythm.      Pulses: Normal pulses.      Heart sounds: Normal heart sounds. No murmur heard.    No gallop.   Pulmonary:      Effort: Pulmonary effort is normal. No respiratory distress.      Breath sounds: Normal breath sounds. No stridor. No wheezing, rhonchi or rales.   Abdominal:      General: Bowel sounds are normal.      Palpations: Abdomen is soft.   Musculoskeletal:         General: No swelling, tenderness or signs of injury.      Cervical back: Normal range of motion. No rigidity.      Right lower leg: No edema.      Left lower leg: No edema.      Comments: Limited circumduction of both shoulders  Limited abduction of both shoulders  Limited extension of both shoulders  Limited flexion of both shoulders   strength is equal bilaterally  Negative Phalen, reverse Phalen, Tinel  Ambulates with rolling walker     Skin:     General: Skin is warm and dry.   Neurological:      Mental Status: She is alert and oriented to person, place, and time.   Psychiatric:         Mood and Affect: Mood normal.         Behavior: Behavior normal.         Assessment/Plan  Type 2 Diabetes  A1c today 7.1  Continue Metformin and monitor CBG  Diabetic Foot Exam:  UTD 5/31/22  Diabetic Eye exam: UTD 5/31/22 - no evidence of diabetic retinopathy    Primary Hypertension  147/80 Blood  pressure is elevated this morning but patient states that she did not take her Norvasc  Continue Norvasc 10, HCTZ-triamterene, Lisinopril, and metoprolol    Lumbar Stenosis with neurogenic claudication  Evaluated by Neurosurgery but on pain control with NORCO  She does not wish to be referred to a neurosurgeon for additional evaluation  Was referred to PT previously. 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

## 2022-11-14 NOTE — PROGRESS NOTES
Attending Physician Addendum  Pt discussed with medicine resident in clinic  Care provided is appropriate  Agree with above ASSESSMENT AND PLAN

## 2022-12-08 ENCOUNTER — HOSPITAL ENCOUNTER (OUTPATIENT)
Dept: RADIOLOGY | Facility: HOSPITAL | Age: 77
Discharge: HOME | End: 2022-12-08
Attending: INTERNAL MEDICINE
Payer: COMMERCIAL

## 2022-12-08 DIAGNOSIS — M54.12 CERVICAL RADICULOPATHY: ICD-10-CM

## 2022-12-08 DIAGNOSIS — M25.512 ACUTE PAIN OF BOTH SHOULDERS: ICD-10-CM

## 2022-12-08 DIAGNOSIS — M25.511 ACUTE PAIN OF BOTH SHOULDERS: ICD-10-CM

## 2022-12-08 PROCEDURE — 72125 CT NECK SPINE W/O DYE: CPT | Mod: TC

## 2022-12-08 PROCEDURE — 73030 X-RAY EXAM OF SHOULDER: CPT | Mod: TC,LT

## 2022-12-08 PROCEDURE — 73030 X-RAY EXAM OF SHOULDER: CPT | Mod: TC,RT

## 2023-01-16 ENCOUNTER — HOSPITAL ENCOUNTER (EMERGENCY)
Facility: HOSPITAL | Age: 78
Discharge: HOME | End: 2023-01-16
Attending: FAMILY MEDICINE
Payer: MEDICARE

## 2023-01-16 VITALS
BODY MASS INDEX: 36.25 KG/M2 | HEART RATE: 84 BPM | TEMPERATURE: 98 F | OXYGEN SATURATION: 96 % | DIASTOLIC BLOOD PRESSURE: 80 MMHG | RESPIRATION RATE: 17 BRPM | HEIGHT: 64 IN | SYSTOLIC BLOOD PRESSURE: 152 MMHG | WEIGHT: 212.31 LBS

## 2023-01-16 DIAGNOSIS — R09.89 UPPER RESPIRATORY SYMPTOM: Primary | ICD-10-CM

## 2023-01-16 LAB
ALBUMIN SERPL-MCNC: 3.6 G/DL (ref 3.4–4.8)
ALBUMIN/GLOB SERPL: 1 RATIO (ref 1.1–2)
ALP SERPL-CCNC: 50 UNIT/L (ref 40–150)
ALT SERPL-CCNC: 21 UNIT/L (ref 0–55)
AST SERPL-CCNC: 16 UNIT/L (ref 5–34)
BASOPHILS # BLD AUTO: 0.09 X10(3)/MCL (ref 0–0.2)
BASOPHILS NFR BLD AUTO: 0.9 %
BILIRUBIN DIRECT+TOT PNL SERPL-MCNC: 0.4 MG/DL
BNP BLD-MCNC: 29.5 PG/ML
BUN SERPL-MCNC: 16.9 MG/DL (ref 9.8–20.1)
CALCIUM SERPL-MCNC: 9.5 MG/DL (ref 8.4–10.2)
CHLORIDE SERPL-SCNC: 108 MMOL/L (ref 98–107)
CO2 SERPL-SCNC: 25 MMOL/L (ref 23–31)
CREAT SERPL-MCNC: 0.85 MG/DL (ref 0.55–1.02)
EOSINOPHIL # BLD AUTO: 0.27 X10(3)/MCL (ref 0–0.9)
EOSINOPHIL NFR BLD AUTO: 2.8 %
ERYTHROCYTE [DISTWIDTH] IN BLOOD BY AUTOMATED COUNT: 13.1 % (ref 11.5–17)
FLUAV AG UPPER RESP QL IA.RAPID: NOT DETECTED
FLUBV AG UPPER RESP QL IA.RAPID: NOT DETECTED
GFR SERPLBLD CREATININE-BSD FMLA CKD-EPI: >60 MLS/MIN/1.73/M2
GLOBULIN SER-MCNC: 3.7 GM/DL (ref 2.4–3.5)
GLUCOSE SERPL-MCNC: 143 MG/DL (ref 82–115)
HCT VFR BLD AUTO: 42.7 % (ref 37–47)
HGB BLD-MCNC: 13.4 GM/DL (ref 12–16)
IMM GRANULOCYTES # BLD AUTO: 0.04 X10(3)/MCL (ref 0–0.04)
IMM GRANULOCYTES NFR BLD AUTO: 0.4 %
LYMPHOCYTES # BLD AUTO: 3.89 X10(3)/MCL (ref 0.6–4.6)
LYMPHOCYTES NFR BLD AUTO: 40.6 %
MCH RBC QN AUTO: 26.2 PG
MCHC RBC AUTO-ENTMCNC: 31.4 MG/DL (ref 33–36)
MCV RBC AUTO: 83.4 FL (ref 80–94)
MONOCYTES # BLD AUTO: 0.62 X10(3)/MCL (ref 0.1–1.3)
MONOCYTES NFR BLD AUTO: 6.5 %
NEUTROPHILS # BLD AUTO: 4.67 X10(3)/MCL (ref 2.1–9.2)
NEUTROPHILS NFR BLD AUTO: 48.8 %
NRBC BLD AUTO-RTO: 0 %
PLATELET # BLD AUTO: 298 X10(3)/MCL (ref 130–400)
PMV BLD AUTO: 11.1 FL (ref 7.4–10.4)
POTASSIUM SERPL-SCNC: 4.3 MMOL/L (ref 3.5–5.1)
PROT SERPL-MCNC: 7.3 GM/DL (ref 5.8–7.6)
RBC # BLD AUTO: 5.12 X10(6)/MCL (ref 4.2–5.4)
SARS-COV-2 RNA RESP QL NAA+PROBE: NOT DETECTED
SODIUM SERPL-SCNC: 142 MMOL/L (ref 136–145)
TROPONIN I SERPL-MCNC: <0.01 NG/ML (ref 0–0.04)
WBC # SPEC AUTO: 9.6 X10(3)/MCL (ref 4.5–11.5)

## 2023-01-16 PROCEDURE — 84484 ASSAY OF TROPONIN QUANT: CPT | Performed by: PHYSICIAN ASSISTANT

## 2023-01-16 PROCEDURE — 85025 COMPLETE CBC W/AUTO DIFF WBC: CPT | Performed by: PHYSICIAN ASSISTANT

## 2023-01-16 PROCEDURE — 93005 ELECTROCARDIOGRAM TRACING: CPT

## 2023-01-16 PROCEDURE — 80053 COMPREHEN METABOLIC PANEL: CPT | Performed by: PHYSICIAN ASSISTANT

## 2023-01-16 PROCEDURE — 83880 ASSAY OF NATRIURETIC PEPTIDE: CPT | Performed by: PHYSICIAN ASSISTANT

## 2023-01-16 PROCEDURE — 0240U COVID/FLU A&B PCR: CPT | Performed by: PHYSICIAN ASSISTANT

## 2023-01-16 PROCEDURE — 99285 EMERGENCY DEPT VISIT HI MDM: CPT | Mod: 25

## 2023-01-16 RX ORDER — BENZONATATE 100 MG/1
100 CAPSULE ORAL 3 TIMES DAILY PRN
Qty: 15 CAPSULE | Refills: 0 | Status: SHIPPED | OUTPATIENT
Start: 2023-01-16 | End: 2023-01-21

## 2023-01-16 RX ORDER — METHOCARBAMOL 500 MG/1
500 TABLET, FILM COATED ORAL NIGHTLY
Qty: 5 TABLET | Refills: 0 | Status: SHIPPED | OUTPATIENT
Start: 2023-01-16 | End: 2023-01-21

## 2023-01-16 RX ORDER — ALBUTEROL SULFATE 90 UG/1
2 AEROSOL, METERED RESPIRATORY (INHALATION) EVERY 6 HOURS PRN
Qty: 18 G | Refills: 0 | Status: SHIPPED | OUTPATIENT
Start: 2023-01-16 | End: 2023-05-18 | Stop reason: SDUPTHER

## 2023-01-17 NOTE — ED PROVIDER NOTES
Encounter Date: 1/16/2023       History     Chief Complaint   Patient presents with    Shortness of Breath     Reports SOB, low grade fever, and body aches x2 days. Denies cough.No distress.       Patient repoprts to the ER with complaints of generalized body aches, subjective fever and loss of taste for the past x3 days; pt reports symptoms of SOB symptoms have been present for the past x1 year    The history is provided by the patient.   Shortness of Breath  This is a recurrent problem. The average episode lasts 3 days. The problem occurs intermittently.The current episode started more than 2 days ago. The problem has not changed since onset.Associated symptoms include a fever. Pertinent negatives include no sore throat, no neck pain, no wheezing, no chest pain, no syncope, no vomiting, no abdominal pain, no rash, no leg pain and no claudication. She has had Prior ED visits.   Review of patient's allergies indicates:  No Known Allergies  Past Medical History:   Diagnosis Date    Diabetes mellitus     Hypertension      No past surgical history on file.  Family History   Problem Relation Age of Onset    Diabetes Mother     Hypertension Mother     Mental retardation Mother     Cancer Father     Mental illness Sister     Hypertension Sister     Diabetes Brother     Stroke Brother      Social History     Tobacco Use    Smoking status: Former    Smokeless tobacco: Former   Substance Use Topics    Alcohol use: Not Currently    Drug use: Not Currently     Review of Systems   Constitutional:  Positive for fever.   HENT:  Negative for sore throat.    Eyes: Negative.    Respiratory:  Positive for shortness of breath. Negative for wheezing.    Cardiovascular:  Negative for chest pain, claudication and syncope.   Gastrointestinal:  Negative for abdominal pain, nausea and vomiting.   Genitourinary:  Negative for dysuria.   Musculoskeletal:  Positive for myalgias. Negative for back pain and neck pain.   Skin:  Negative for rash.    Neurological:  Negative for weakness.   Hematological:  Does not bruise/bleed easily.   Psychiatric/Behavioral: Negative.       Physical Exam     Initial Vitals [01/16/23 1736]   BP Pulse Resp Temp SpO2   (!) 188/96 97 (!) 23 97.5 °F (36.4 °C) 97 %      MAP       --         Physical Exam    Vitals reviewed.  Constitutional: She appears well-developed and well-nourished.   HENT:   Head: Normocephalic and atraumatic.   Eyes: Conjunctivae and EOM are normal. Pupils are equal, round, and reactive to light.   Neck: Neck supple.   Normal range of motion.  Cardiovascular:  Normal rate, regular rhythm and normal heart sounds.           Pulmonary/Chest: Breath sounds normal. No respiratory distress. She has no wheezes. She has no rhonchi. She has no rales. She exhibits no tenderness.   Abdominal: Abdomen is soft. Bowel sounds are normal. There is no abdominal tenderness.   Musculoskeletal:         General: Normal range of motion.      Cervical back: Normal range of motion and neck supple.     Neurological: She is alert and oriented to person, place, and time. She displays normal reflexes. No cranial nerve deficit or sensory deficit.   Skin: Skin is warm and dry.   Psychiatric: She has a normal mood and affect. Her behavior is normal. Judgment and thought content normal.       ED Course   Procedures  Labs Reviewed   COMPREHENSIVE METABOLIC PANEL - Abnormal; Notable for the following components:       Result Value    Chloride 108 (*)     Glucose Level 143 (*)     Globulin 3.7 (*)     Albumin/Globulin Ratio 1.0 (*)     All other components within normal limits   CBC WITH DIFFERENTIAL - Abnormal; Notable for the following components:    MCHC 31.4 (*)     MPV 11.1 (*)     All other components within normal limits   COVID/FLU A&B PCR - Normal    Narrative:     The Xpert Xpress SARS-CoV-2/FLU/RSV plus is a rapid, multiplexed real-time PCR test intended for the simultaneous qualitative detection and differentiation of SARS-CoV-2,  Influenza A, Influenza B, and respiratory syncytial virus (RSV) viral RNA in either nasopharyngeal swab or nasal swab specimens.         TROPONIN I - Normal   B-TYPE NATRIURETIC PEPTIDE - Normal   CBC W/ AUTO DIFFERENTIAL    Narrative:     The following orders were created for panel order CBC auto differential.  Procedure                               Abnormality         Status                     ---------                               -----------         ------                     CBC with Differential[[id number]]        Abnormal            Final result                 Please view results for these tests on the individual orders.          Imaging Results              X-Ray Chest 1 View (Final result)  Result time 01/16/23 19:39:00      Final result by Elliot Friend MD (01/16/23 19:39:00)                   Impression:      Minimal bibasilar atelectasis.      Electronically signed by: Elliot Friend MD  Date:    01/16/2023  Time:    19:39               Narrative:    EXAMINATION:  Single view chest radiograph.    CLINICAL HISTORY:  Shortness of breath    TECHNIQUE:  Single view of the chest.    COMPARISON:  Chest radiograph 07/04/2022.    FINDINGS:  There is minimal bibasilar atelectasis without consolidation or effusion.  There is no pneumothorax.  The cardiac silhouette is normal in size.  There is no acute osseous abnormality.                                       Medications - No data to display                           Clinical Impression:   Final diagnoses:  [R09.89] Upper respiratory symptom (Primary)        ED Disposition Condition    Discharge Stable          ED Prescriptions       Medication Sig Dispense Start Date End Date Auth. Provider    benzonatate (TESSALON) 100 MG capsule Take 1 capsule (100 mg total) by mouth 3 (three) times daily as needed for Cough. 15 capsule 1/16/2023 1/21/2023 KING Camargo    methocarbamoL (ROBAXIN) 500 MG Tab Take 1 tablet (500 mg total) by mouth every evening. for 5 days 5  tablet 1/16/2023 1/21/2023 KING Camargo    albuterol (PROVENTIL HFA) 90 mcg/actuation inhaler Inhale 2 puffs into the lungs every 6 (six) hours as needed for Shortness of Breath. Rescue 18 g 1/16/2023 1/23/2023 KING Camargo          Follow-up Information       Follow up With Specialties Details Why Contact Info    Pawan Oneil,  Internal Medicine   [address] [address]  [phone]      discharge followup    If your symptoms become WORSE or you DO NOT IMPROVE and you are unable to reach your health care provider, you should RETURN to the emergency department    discharge info    Discussed all pertinent ED information, results, diagnosis and treatment plan; All questions and concerns were addressed at this time. Patient voices understanding of information and instructions. Patient is comfortable with plan and discharge             KING Camargo  01/16/23 [id number]

## 2023-02-08 DIAGNOSIS — I10 HYPERTENSION, UNSPECIFIED TYPE: Primary | ICD-10-CM

## 2023-02-08 RX ORDER — METOPROLOL SUCCINATE 25 MG/1
25 TABLET, EXTENDED RELEASE ORAL DAILY
Qty: 30 TABLET | Refills: 3 | Status: SHIPPED | OUTPATIENT
Start: 2023-02-08 | End: 2023-06-01 | Stop reason: SDUPTHER

## 2023-02-09 ENCOUNTER — HOSPITAL ENCOUNTER (OUTPATIENT)
Dept: RADIOLOGY | Facility: HOSPITAL | Age: 78
Discharge: HOME | End: 2023-02-09
Attending: INTERNAL MEDICINE
Payer: MEDICARE

## 2023-02-09 ENCOUNTER — OFFICE VISIT (OUTPATIENT)
Dept: INTERNAL MEDICINE | Facility: CLINIC | Age: 78
End: 2023-02-09
Payer: MEDICARE

## 2023-02-09 VITALS
TEMPERATURE: 99 F | BODY MASS INDEX: 36.33 KG/M2 | RESPIRATION RATE: 20 BRPM | SYSTOLIC BLOOD PRESSURE: 180 MMHG | HEIGHT: 64 IN | HEART RATE: 73 BPM | DIASTOLIC BLOOD PRESSURE: 81 MMHG | WEIGHT: 212.81 LBS | OXYGEN SATURATION: 96 %

## 2023-02-09 DIAGNOSIS — E11.9 TYPE 2 DIABETES MELLITUS WITHOUT COMPLICATION, WITHOUT LONG-TERM CURRENT USE OF INSULIN: ICD-10-CM

## 2023-02-09 DIAGNOSIS — E11.9 TYPE 2 DIABETES MELLITUS WITHOUT COMPLICATION, WITHOUT LONG-TERM CURRENT USE OF INSULIN: Primary | ICD-10-CM

## 2023-02-09 DIAGNOSIS — J18.9 PNEUMONIA OF BOTH LOWER LOBES DUE TO INFECTIOUS ORGANISM: ICD-10-CM

## 2023-02-09 DIAGNOSIS — I10 PRIMARY HYPERTENSION: Primary | ICD-10-CM

## 2023-02-09 DIAGNOSIS — M48.062 SPINAL STENOSIS OF LUMBAR REGION WITH NEUROGENIC CLAUDICATION: ICD-10-CM

## 2023-02-09 DIAGNOSIS — E78.5 HYPERLIPIDEMIA, UNSPECIFIED HYPERLIPIDEMIA TYPE: ICD-10-CM

## 2023-02-09 LAB — HBA1C MFR BLD: 7.3 %

## 2023-02-09 PROCEDURE — 83036 HEMOGLOBIN GLYCOSYLATED A1C: CPT | Mod: PBBFAC

## 2023-02-09 PROCEDURE — 71046 X-RAY EXAM CHEST 2 VIEWS: CPT | Mod: TC

## 2023-02-09 PROCEDURE — 90677 PCV20 VACCINE IM: CPT | Mod: PBBFAC

## 2023-02-09 PROCEDURE — 99214 OFFICE O/P EST MOD 30 MIN: CPT | Mod: PBBFAC

## 2023-02-09 RX ORDER — METFORMIN HYDROCHLORIDE 500 MG/1
500 TABLET ORAL 2 TIMES DAILY
Qty: 60 TABLET | Refills: 4 | Status: SHIPPED | OUTPATIENT
Start: 2023-02-09 | End: 2023-06-01 | Stop reason: SDUPTHER

## 2023-02-09 RX ORDER — HYDROCODONE BITARTRATE AND ACETAMINOPHEN 7.5; 325 MG/1; MG/1
1 TABLET ORAL EVERY 6 HOURS PRN
Qty: 60 TABLET | Refills: 0 | Status: SHIPPED | OUTPATIENT
Start: 2023-02-09 | End: 2023-04-10

## 2023-02-09 NOTE — PROGRESS NOTES
IM Clinic    Chief Complaint  Chief Complaint   Patient presents with    Follow-up     Pt here today for 3mth f/u visit. No SOB noted at this time. Pt c/o hip/ leg pain and numbness noted to fingers        HPI  Charlotte Choi is a 77 y.o. female who presents to clinic today for follow-up of chronic medical conditions.  Has chronic pain that is unchanged. She is managing with Norco that is being used very sparingly. No other complaints today    ROS  Review of Systems   Constitutional:  Negative for chills and fever.   Respiratory:  Negative for cough and shortness of breath.    Cardiovascular:  Negative for chest pain.   Genitourinary:  Negative for dysuria.   Musculoskeletal:  Positive for back pain and neck pain.         PE  Vitals:    02/09/23 1322   BP: (!) 180/81   Pulse: 73   Resp: 20   Temp: 98.6 °F (37 °C)      Physical Exam  Physical Exam  Vitals and nursing note reviewed.   Constitutional:       General: She is not in acute distress.     Appearance: Normal appearance. She is normal weight. She is not ill-appearing or toxic-appearing.   HENT:      Head: Normocephalic and atraumatic.   Eyes:      Extraocular Movements: Extraocular movements intact.      Conjunctiva/sclera: Conjunctivae normal.      Pupils: Pupils are equal, round, and reactive to light.   Cardiovascular:      Rate and Rhythm: Normal rate and regular rhythm.      Pulses: Normal pulses.      Heart sounds: Normal heart sounds. No murmur heard.    No gallop.   Pulmonary:      Effort: Pulmonary effort is normal. No respiratory distress.      Breath sounds: Normal breath sounds. No stridor. No wheezing, rhonchi or rales.   Musculoskeletal:         General: No swelling.      Right lower leg: No edema.      Left lower leg: No edema.   Skin:     General: Skin is warm and dry.   Neurological:      General: No focal deficit present.      Mental Status: She is alert and oriented to person, place, and time.         Assessment/Plan  Type 2 Diabetes  A1c  today 7.3  Continue Metformin and monitor CBG  Diabetic Foot Exam:  UTD 5/31/22  Diabetic Eye exam: UTD 5/31/22 - no evidence of diabetic retinopathy    Primary Hypertension  172/73 on manual repeat. Did not take medications today. Also in pain.   Continue Norvasc, HCTZ-triamterene, and metoprolol  Send home with BP log and acute care appointment in 1-2 weeks for blood pressure check.     Lumbar Stenosis with neurogenic claudication  She does not wish to be referred to a neurosurgeon for additional evaluation  Was referred to PT previously. Insurance pending  Norco in the morning and Gabapentin at night    Hyperlipidemia  Continue Lipitor 40    Neck pain/Shoulder pain  CT C-spine:  Spondylosis as above including uncovertebral spurring and foraminal narrowing C3-C4 and C4-C5      Health Maintenance:  Pneumonia vax today  Immunization History   Administered Date(s) Administered    COVID-19, MRNA, LN-S, PF (Pfizer) (Purple Cap) 01/29/2021, 02/22/2021, 12/03/2021    Influenza - Quadrivalent 11/17/2020    Influenza - Quadrivalent - High Dose - PF (65 years and older) 10/19/2021    Tdap 11/21/2016    Zoster Recombinant 10/19/2021, 02/07/2022       Pawan Oneil DO  Internal Medicine - PGY-2

## 2023-03-14 ENCOUNTER — TELEPHONE (OUTPATIENT)
Dept: INTERNAL MEDICINE | Facility: CLINIC | Age: 78
End: 2023-03-14
Payer: MEDICARE

## 2023-03-14 NOTE — TELEPHONE ENCOUNTER
Pt called stating she has been having severe pain in both legs, (R) worst then (L). Pt states she thought she had blood clots, Pt requesting a sooner appt than June. Pt states she has been taking OTC aleve, and her prescribed Norco.

## 2023-03-15 ENCOUNTER — HOSPITAL ENCOUNTER (EMERGENCY)
Facility: HOSPITAL | Age: 78
Discharge: HOME | End: 2023-03-15
Attending: EMERGENCY MEDICINE
Payer: MEDICARE

## 2023-03-15 VITALS
DIASTOLIC BLOOD PRESSURE: 85 MMHG | HEART RATE: 78 BPM | RESPIRATION RATE: 18 BRPM | SYSTOLIC BLOOD PRESSURE: 162 MMHG | WEIGHT: 212 LBS | BODY MASS INDEX: 36.39 KG/M2 | OXYGEN SATURATION: 96 % | TEMPERATURE: 99 F

## 2023-03-15 DIAGNOSIS — M54.31 BACK PAIN WITH RIGHT-SIDED SCIATICA: Primary | ICD-10-CM

## 2023-03-15 LAB
ALBUMIN SERPL-MCNC: 3.9 G/DL (ref 3.4–4.8)
ALBUMIN/GLOB SERPL: 0.8 RATIO (ref 1.1–2)
ALP SERPL-CCNC: 58 UNIT/L (ref 40–150)
ALT SERPL-CCNC: 25 UNIT/L (ref 0–55)
AST SERPL-CCNC: 20 UNIT/L (ref 5–34)
BASOPHILS # BLD AUTO: 0.08 X10(3)/MCL (ref 0–0.2)
BASOPHILS NFR BLD AUTO: 1 %
BILIRUBIN DIRECT+TOT PNL SERPL-MCNC: 0.4 MG/DL
BUN SERPL-MCNC: 20.7 MG/DL (ref 9.8–20.1)
CALCIUM SERPL-MCNC: 10 MG/DL (ref 8.4–10.2)
CHLORIDE SERPL-SCNC: 103 MMOL/L (ref 98–107)
CO2 SERPL-SCNC: 31 MMOL/L (ref 23–31)
CREAT SERPL-MCNC: 1.07 MG/DL (ref 0.55–1.02)
CRP SERPL-MCNC: 3.9 MG/L
D DIMER PPP IA.FEU-MCNC: 0.31 UG/ML FEU (ref 0–0.5)
EOSINOPHIL # BLD AUTO: 0.24 X10(3)/MCL (ref 0–0.9)
EOSINOPHIL NFR BLD AUTO: 2.9 %
ERYTHROCYTE [DISTWIDTH] IN BLOOD BY AUTOMATED COUNT: 13.9 % (ref 11.5–17)
GFR SERPLBLD CREATININE-BSD FMLA CKD-EPI: 53 MLS/MIN/1.73/M2
GLOBULIN SER-MCNC: 4.6 GM/DL (ref 2.4–3.5)
GLUCOSE SERPL-MCNC: 162 MG/DL (ref 82–115)
HCT VFR BLD AUTO: 42.9 % (ref 37–47)
HGB BLD-MCNC: 13.7 G/DL (ref 12–16)
IMM GRANULOCYTES # BLD AUTO: 0.02 X10(3)/MCL (ref 0–0.04)
IMM GRANULOCYTES NFR BLD AUTO: 0.2 %
LYMPHOCYTES # BLD AUTO: 3.45 X10(3)/MCL (ref 0.6–4.6)
LYMPHOCYTES NFR BLD AUTO: 41.1 %
MCH RBC QN AUTO: 26.6 PG
MCHC RBC AUTO-ENTMCNC: 31.9 G/DL (ref 33–36)
MCV RBC AUTO: 83.3 FL (ref 80–94)
MONOCYTES # BLD AUTO: 0.53 X10(3)/MCL (ref 0.1–1.3)
MONOCYTES NFR BLD AUTO: 6.3 %
NEUTROPHILS # BLD AUTO: 4.08 X10(3)/MCL (ref 2.1–9.2)
NEUTROPHILS NFR BLD AUTO: 48.5 %
NRBC BLD AUTO-RTO: 0 %
PLATELET # BLD AUTO: 300 X10(3)/MCL (ref 130–400)
PMV BLD AUTO: 11 FL (ref 7.4–10.4)
POTASSIUM SERPL-SCNC: 3.8 MMOL/L (ref 3.5–5.1)
PROT SERPL-MCNC: 8.5 GM/DL (ref 5.8–7.6)
RBC # BLD AUTO: 5.15 X10(6)/MCL (ref 4.2–5.4)
SODIUM SERPL-SCNC: 142 MMOL/L (ref 136–145)
WBC # SPEC AUTO: 8.4 X10(3)/MCL (ref 4.5–11.5)

## 2023-03-15 PROCEDURE — 99284 EMERGENCY DEPT VISIT MOD MDM: CPT

## 2023-03-15 PROCEDURE — 80053 COMPREHEN METABOLIC PANEL: CPT | Performed by: NURSE PRACTITIONER

## 2023-03-15 PROCEDURE — 86140 C-REACTIVE PROTEIN: CPT | Performed by: NURSE PRACTITIONER

## 2023-03-15 PROCEDURE — 85379 FIBRIN DEGRADATION QUANT: CPT | Performed by: NURSE PRACTITIONER

## 2023-03-15 PROCEDURE — 85025 COMPLETE CBC W/AUTO DIFF WBC: CPT | Performed by: NURSE PRACTITIONER

## 2023-03-15 RX ORDER — GABAPENTIN 300 MG/1
300 CAPSULE ORAL DAILY
Qty: 14 CAPSULE | Refills: 0 | Status: SHIPPED | OUTPATIENT
Start: 2023-03-15 | End: 2023-06-01 | Stop reason: SDUPTHER

## 2023-03-15 NOTE — ED PROVIDER NOTES
Encounter Date: 3/15/2023       History     Chief Complaint   Patient presents with    Leg Pain     Bilateral lower leg pain, right worse than left, denies injury. Pt states she is worried about blood clot. Pt has no hx of DVT     Pt is a 77 y.o. female who presents to the [facility] ED complaining of bilateral leg pain, Rt worse than Lt for last few days. Pt reports being very scared of possible DVT in affected leg. Denies unilateral leg swelling, redness to affected leg, coolness to affected leg, chest pain, SOB, weakness, dizziness, or fever. Pt with Hx of DM, HTN, chronic back pain. Denies relief in current pain with her current prescriptions.     Review of patient's allergies indicates:   Allergen Reactions    Iodine Swelling     Past Medical History:   Diagnosis Date    Diabetes mellitus     Hypertension      No past surgical history on file.  Family History   Problem Relation Age of Onset    Diabetes Mother     Hypertension Mother     Mental retardation Mother     Cancer Father     Mental illness Sister     Hypertension Sister     Diabetes Brother     Stroke Brother      Social History     Tobacco Use    Smoking status: Former    Smokeless tobacco: Former   Substance Use Topics    Alcohol use: Not Currently    Drug use: Not Currently     Review of Systems   Constitutional:  Negative for chills, diaphoresis, fatigue and fever.   HENT:  Negative for facial swelling, rhinorrhea, sinus pressure, sinus pain, sore throat and trouble swallowing.    Respiratory:  Negative for cough, chest tightness, shortness of breath and wheezing.    Cardiovascular:  Negative for chest pain, palpitations and leg swelling.   Gastrointestinal:  Negative for abdominal pain, diarrhea, nausea and vomiting.   Genitourinary:  Negative for dysuria, flank pain, frequency, hematuria and urgency.   Musculoskeletal:  Positive for arthralgias and back pain. Negative for joint swelling and myalgias.   Skin:  Negative for color change and rash.    Neurological:  Negative for dizziness, syncope, weakness and light-headedness.   Hematological:  Does not bruise/bleed easily.   All other systems reviewed and are negative.    Physical Exam     Initial Vitals [03/15/23 1220]   BP Pulse Resp Temp SpO2   (!) 162/85 78 18 98.8 °F (37.1 °C) 96 %      MAP       --         Physical Exam    Nursing note and vitals reviewed.  Constitutional: She appears well-developed and well-nourished.   HENT:   Head: Normocephalic and atraumatic.   Nose: Nose normal.   Mouth/Throat: Oropharynx is clear and moist.   Eyes: Conjunctivae and EOM are normal. Pupils are equal, round, and reactive to light.   Neck: Neck supple.   Normal range of motion.  Cardiovascular:  Normal rate, regular rhythm, normal heart sounds and intact distal pulses.           Pulmonary/Chest: Effort normal and breath sounds normal. No respiratory distress. She has no wheezes. She has no rhonchi. She has no rales. She exhibits no tenderness.   Abdominal: Abdomen is soft and flat. Bowel sounds are normal. She exhibits no distension. There is no abdominal tenderness. There is no rebound, no guarding, no tenderness at McBurney's point and negative Moser's sign. negative psoas sign  Musculoskeletal:         General: Normal range of motion.      Cervical back: Normal range of motion and neck supple.      Lumbar back: Spasms and tenderness present. No swelling, edema, deformity or signs of trauma.        Back:       Left lower leg: Tenderness present. No swelling.        Legs:       Comments: Muscle spasms present along Rt portion of lumbar spine and sacrum.Tenderness present along lateral aspect of Rt leg. Muscle spasms present to Rt gluteal.       Neurological: She is alert and oriented to person, place, and time. She has normal strength and normal reflexes.   Skin: Skin is warm and dry. Capillary refill takes less than 2 seconds.   Psychiatric: She has a normal mood and affect. Her speech is normal and behavior is  normal. Judgment and thought content normal.       ED Course   Procedures  Labs Reviewed   COMPREHENSIVE METABOLIC PANEL - Abnormal; Notable for the following components:       Result Value    Glucose Level 162 (*)     Blood Urea Nitrogen 20.7 (*)     Creatinine 1.07 (*)     Protein Total 8.5 (*)     Globulin 4.6 (*)     Albumin/Globulin Ratio 0.8 (*)     All other components within normal limits   CBC WITH DIFFERENTIAL - Abnormal; Notable for the following components:    MCHC 31.9 (*)     MPV 11.0 (*)     All other components within normal limits   C-REACTIVE PROTEIN - Normal   D DIMER, QUANTITATIVE - Normal   CBC W/ AUTO DIFFERENTIAL    Narrative:     The following orders were created for panel order CBC auto differential.  Procedure                               Abnormality         Status                     ---------                               -----------         ------                     CBC with Differential[[id number]]        Abnormal            Final result                 Please view results for these tests on the individual orders.   EXTRA TUBES    Narrative:     The following orders were created for panel order EXTRA TUBES.  Procedure                               Abnormality         Status                     ---------                               -----------         ------                     Red Top Hold[[id number]]                                     In process                 Gold Top Hold[[id number]]                                    In process                 Pink Top Hold[[id number]]                                    In process                   Please view results for these tests on the individual orders.   RED TOP HOLD   GOLD TOP HOLD   PINK TOP HOLD          Imaging Results              X-Ray Lumbar Spine Ap And Lateral (In process)                      Medications - No data to display  Medical Decision Making:   Differential Diagnosis:   DDD  Sciatica  DVT  Fracture  Muscle strain  Clinical  Tests:   Lab Tests: Ordered and Reviewed  Radiological Study: Ordered and Reviewed  ED Management:  1:52 PM Reassessed patient at this time. Reports condition has improved. Discussed with patient all pertinent ED information and results. Discussed diagnosis and treatment plan with patient. With a low D-dimer, chronic back pain as well as a low Well's score, I do not believe pt symptoms are secondary to a DVT. I will treat suspected neurologic cause for today's pain symptoms. I have discussed signs and symptoms of DVT as well as strict return precautions with pt. Follow up instructions and return to ED instruction have been given. All questions and concerns were addressed at this time. Patient voices understanding of information and instructions. Patient is comfortable with plan and discharge. Patient is stable for discharge.     Well's Criteria for DVT:  Clinical signs and symptoms for DVT:                                                               No  Collateral (nonvaricose) superficial veins present:                                            Yes   +1  Entire leg swollen:                                                                                            No  Immobilization at least 3 days OR Surgery  in the previous 12 weeks:                                                                                No  Localized tenderness along the deep venous system:                                     No  Pitting edema, confined to symptomatic leg:                                                     No     Paralysis, paresis, or recent plaster immobilization of the lower extremity:         No  Previously documented DVT:                                                                           No  Alternative diagnosis to DVT as likely or more likely:                                        Yes  -2  Calf swelling >3 cm compared to the other leg:                                                No  Active Cancer:      "                                                                                            No      -1 points  Low risk group for DVT. "Unlikely" according to Wells' DVT studies.                            Clinical Impression:   Final diagnoses:  [M54.31] Back pain with right-sided sciatica (Primary)        ED Disposition Condition    Discharge Stable          ED Prescriptions       Medication Sig Dispense Start Date End Date Auth. Provider    gabapentin (NEURONTIN) 300 MG capsule Take 1 capsule (300 mg total) by mouth once daily. for 14 days 14 capsule 3/15/2023 3/29/2023 Kiran Bill Jr., RADHAP          Follow-up Information       Follow up With Specialties Details Why Contact Info    Pawan Oneil, DO Internal Medicine In 3 days  [address] [facility] [address]  [phone]      [facility] Emergency Medicine In 3 days As needed, If symptoms worsen [address] [facility] [address] [phone]             Kiran Bill Jr., DOMINIC  03/15/23 1400    "

## 2023-03-15 NOTE — DISCHARGE INSTRUCTIONS
Follow up with your primary care physician in 3-5 days for follow up evaluation.  Take medication as prescribed.  Return to the [facility] ED immediately for onset of chest pain, SOB, unilateral leg swelling, or fever.

## 2023-04-21 RX ORDER — ATORVASTATIN CALCIUM 40 MG/1
40 TABLET, FILM COATED ORAL DAILY
Qty: 30 TABLET | Refills: 4 | Status: SHIPPED | OUTPATIENT
Start: 2023-04-21 | End: 2023-06-01 | Stop reason: SDUPTHER

## 2023-04-21 RX ORDER — TRIAMTERENE AND HYDROCHLOROTHIAZIDE 75; 50 MG/1; MG/1
1 TABLET ORAL DAILY
Qty: 30 TABLET | Refills: 4 | Status: SHIPPED | OUTPATIENT
Start: 2023-04-21 | End: 2023-06-01 | Stop reason: SDUPTHER

## 2023-05-18 RX ORDER — ALBUTEROL SULFATE 90 UG/1
2 AEROSOL, METERED RESPIRATORY (INHALATION) EVERY 6 HOURS PRN
Qty: 18 G | Refills: 2 | Status: SHIPPED | OUTPATIENT
Start: 2023-05-18

## 2023-06-01 ENCOUNTER — OFFICE VISIT (OUTPATIENT)
Dept: INTERNAL MEDICINE | Facility: CLINIC | Age: 78
End: 2023-06-01
Payer: MEDICARE

## 2023-06-01 VITALS
BODY MASS INDEX: 35.85 KG/M2 | TEMPERATURE: 98 F | RESPIRATION RATE: 20 BRPM | OXYGEN SATURATION: 94 % | WEIGHT: 210 LBS | HEART RATE: 75 BPM | HEIGHT: 64 IN | SYSTOLIC BLOOD PRESSURE: 148 MMHG | DIASTOLIC BLOOD PRESSURE: 78 MMHG

## 2023-06-01 DIAGNOSIS — I10 PRIMARY HYPERTENSION: Primary | ICD-10-CM

## 2023-06-01 DIAGNOSIS — E78.5 HYPERLIPIDEMIA, UNSPECIFIED HYPERLIPIDEMIA TYPE: ICD-10-CM

## 2023-06-01 DIAGNOSIS — E11.9 TYPE 2 DIABETES MELLITUS WITHOUT COMPLICATION, WITHOUT LONG-TERM CURRENT USE OF INSULIN: ICD-10-CM

## 2023-06-01 DIAGNOSIS — I10 HYPERTENSION, UNSPECIFIED TYPE: ICD-10-CM

## 2023-06-01 DIAGNOSIS — M48.062 SPINAL STENOSIS OF LUMBAR REGION WITH NEUROGENIC CLAUDICATION: ICD-10-CM

## 2023-06-01 PROCEDURE — 99214 OFFICE O/P EST MOD 30 MIN: CPT | Mod: PBBFAC

## 2023-06-01 RX ORDER — AMLODIPINE BESYLATE 10 MG/1
5 TABLET ORAL DAILY
Qty: 30 TABLET | Refills: 4 | Status: SHIPPED | OUTPATIENT
Start: 2023-06-01 | End: 2023-09-20

## 2023-06-01 RX ORDER — LATANOPROST 50 UG/ML
1 SOLUTION/ DROPS OPHTHALMIC DAILY
COMMUNITY
Start: 2023-04-24

## 2023-06-01 RX ORDER — METOPROLOL SUCCINATE 25 MG/1
50 TABLET, EXTENDED RELEASE ORAL DAILY
Qty: 30 TABLET | Refills: 3 | Status: SHIPPED | OUTPATIENT
Start: 2023-06-01 | End: 2023-08-22 | Stop reason: SDUPTHER

## 2023-06-01 RX ORDER — LISINOPRIL 5 MG/1
5 TABLET ORAL DAILY
Qty: 90 TABLET | Refills: 3 | Status: SHIPPED | OUTPATIENT
Start: 2023-06-01 | End: 2023-09-20 | Stop reason: SDUPTHER

## 2023-06-01 RX ORDER — TRIAMTERENE AND HYDROCHLOROTHIAZIDE 75; 50 MG/1; MG/1
1 TABLET ORAL DAILY
Qty: 30 TABLET | Refills: 4 | Status: SHIPPED | OUTPATIENT
Start: 2023-06-01 | End: 2023-09-20 | Stop reason: SDUPTHER

## 2023-06-01 RX ORDER — HYDROCODONE BITARTRATE AND ACETAMINOPHEN 7.5; 325 MG/1; MG/1
1 TABLET ORAL EVERY 6 HOURS PRN
COMMUNITY
End: 2023-06-01 | Stop reason: SDUPTHER

## 2023-06-01 RX ORDER — METFORMIN HYDROCHLORIDE 500 MG/1
500 TABLET ORAL 2 TIMES DAILY
Qty: 60 TABLET | Refills: 4 | Status: SHIPPED | OUTPATIENT
Start: 2023-06-01 | End: 2023-09-20

## 2023-06-01 RX ORDER — GABAPENTIN 300 MG/1
300 CAPSULE ORAL DAILY
Qty: 14 CAPSULE | Refills: 0 | Status: SHIPPED | OUTPATIENT
Start: 2023-06-01 | End: 2023-06-09 | Stop reason: DRUGHIGH

## 2023-06-01 RX ORDER — ATORVASTATIN CALCIUM 40 MG/1
40 TABLET, FILM COATED ORAL DAILY
Qty: 30 TABLET | Refills: 4 | Status: SHIPPED | OUTPATIENT
Start: 2023-06-01 | End: 2023-09-20

## 2023-06-01 RX ORDER — DULOXETIN HYDROCHLORIDE 20 MG/1
20 CAPSULE, DELAYED RELEASE ORAL DAILY
Qty: 30 CAPSULE | Refills: 11 | Status: SHIPPED | OUTPATIENT
Start: 2023-06-01 | End: 2024-05-31

## 2023-06-01 RX ORDER — HYDROCODONE BITARTRATE AND ACETAMINOPHEN 7.5; 325 MG/1; MG/1
1 TABLET ORAL EVERY 6 HOURS PRN
Qty: 30 TABLET | Refills: 0 | Status: SHIPPED | OUTPATIENT
Start: 2023-06-01 | End: 2023-06-06

## 2023-06-01 NOTE — PROGRESS NOTES
[facility]    Chief Complaint  Chief Complaint   Patient presents with    Follow-up     Pt here today for f/u visit. Pt c/o rt/ sided facial numbness that comes and goes. Also c/o chronic b/l leg and back pain. Wants to have injections.        HPI  Charlotte Choi is a 77 y.o. female who presents to clinic today for follow-up of chronic medical conditions.      Since last visit she reports continued back pain and leg pain. Pain has somewhat worsened after being out of Norco. No weakness or loss of sensation or incontinence. She now notes right sided facial numbness. However, she states she has been sleeping on her right side as of late.     ROS  Review of Systems   Constitutional:  Negative for chills and fever.   Respiratory:  Negative for cough and shortness of breath.    Cardiovascular:  Negative for chest pain.   Genitourinary:  Negative for dysuria.   Musculoskeletal:  Positive for back pain and neck pain.   Neurological:  Positive for tingling (face).         PE  Vitals:    06/01/23 1250   BP: (!) 155/88   Pulse: 75   Resp: 20   Temp: 98.2 °F (36.8 °C)      Physical Exam  Physical Exam  Vitals and nursing note reviewed.   Constitutional:       General: She is not in acute distress.     Appearance: Normal appearance. She is normal weight. She is not ill-appearing or toxic-appearing.   HENT:      Head: Normocephalic and atraumatic.   Eyes:      Extraocular Movements: Extraocular movements intact.      Conjunctiva/sclera: Conjunctivae normal.      Pupils: Pupils are equal, round, and reactive to light.   Cardiovascular:      Rate and Rhythm: Normal rate and regular rhythm.      Pulses: Normal pulses.      Heart sounds: Normal heart sounds. No murmur heard.    No gallop.   Pulmonary:      Effort: Pulmonary effort is normal. No respiratory distress.      Breath sounds: Normal breath sounds. No stridor. No wheezing, rhonchi or rales.   Musculoskeletal:         General: No swelling.      Right lower leg: No edema.       Left lower leg: No edema.   Skin:     General: Skin is warm and dry.   Neurological:      General: No focal deficit present.      Mental Status: She is alert and oriented to person, place, and time.      Comments: Sensation intact throughout face  No facial droop  Face symmetric         Assessment/Plan  Type 2 Diabetes  A1c 7.3 2/9/23  Continue Metformin and monitor CBG  Has diabetic retinopathy but followed by CIELO cohen    Primary Hypertension  Blood pressure remains elevated today  Continue Norvasc, HCTZ-triamterene, and metoprolol  Add lisinopril 5 and check BMP in one week    Lumbar Stenosis with neurogenic claudication  She does not wish to be referred to a neurosurgeon for additional evaluation  Was referred to PT previously. Insurance pending  Norco in the morning and Gabapentin at night  Start Duloxetine  Send to pain med    Hyperlipidemia  Continue Lipitor 40    Neck pain/Shoulder pain  CT C-spine:  Spondylosis as above including uncovertebral spurring and foraminal narrowing C3-C4 and C4-C5      Health Maintenance:  Pneumonia vax today  Immunization History   Administered Date(s) Administered    COVID-19, MRNA, LN-S, PF (Pfizer) (Purple Cap) 01/29/2021, 02/22/2021, 12/03/2021    Influenza - Quadrivalent 11/17/2020    Influenza - Quadrivalent - High Dose - PF (65 years and older) 10/19/2021    Tdap 11/21/2016    Zoster Recombinant 10/19/2021, 02/07/2022       Pawan Oneil DO  Internal Medicine - PGY-2

## 2023-06-02 ENCOUNTER — TELEPHONE (OUTPATIENT)
Dept: INTERNAL MEDICINE | Facility: CLINIC | Age: 78
End: 2023-06-02

## 2023-06-02 NOTE — TELEPHONE ENCOUNTER
Pt called stating at her office visit yesterday she was only give a 30 day supply of her Norco and she usually gets 60. Pt states she did not  Rx from pharmacy yet, and requesting the Dr Oneil resend a new Rx to pharmacy for her correct dosage. Pt also requesting a call from Art as she was the Nurse that assessed her yesterday.

## 2023-06-05 ENCOUNTER — TELEPHONE (OUTPATIENT)
Dept: INTERNAL MEDICINE | Facility: CLINIC | Age: 78
End: 2023-06-05
Payer: MEDICARE

## 2023-06-05 NOTE — TELEPHONE ENCOUNTER
Called pt to inform her that her request for increase  in the amount of Norco would be sent to PCP. Pt verbalizes understanding. States she hasn't filled Rx.

## 2023-06-06 ENCOUNTER — TELEPHONE (OUTPATIENT)
Dept: INTERNAL MEDICINE | Facility: CLINIC | Age: 78
End: 2023-06-06
Payer: MEDICARE

## 2023-06-06 RX ORDER — HYDROCODONE BITARTRATE AND ACETAMINOPHEN 7.5; 325 MG/1; MG/1
1 TABLET ORAL EVERY 6 HOURS PRN
Qty: 60 TABLET | Refills: 0 | Status: SHIPPED | OUTPATIENT
Start: 2023-06-06 | End: 2024-01-10 | Stop reason: SDUPTHER

## 2023-06-06 NOTE — TELEPHONE ENCOUNTER
Called pt to inform her of  increase in the amount of her medication.  Pt verbalized understanding of informations given.

## 2023-06-09 ENCOUNTER — OFFICE VISIT (OUTPATIENT)
Dept: PAIN MEDICINE | Facility: CLINIC | Age: 78
End: 2023-06-09
Payer: MEDICARE

## 2023-06-09 VITALS
TEMPERATURE: 99 F | BODY MASS INDEX: 35.85 KG/M2 | DIASTOLIC BLOOD PRESSURE: 76 MMHG | HEIGHT: 64 IN | WEIGHT: 210 LBS | HEART RATE: 82 BPM | SYSTOLIC BLOOD PRESSURE: 127 MMHG

## 2023-06-09 DIAGNOSIS — M53.86 SCIATICA ASSOCIATED WITH DISORDER OF LUMBAR SPINE: ICD-10-CM

## 2023-06-09 DIAGNOSIS — M51.36 DDD (DEGENERATIVE DISC DISEASE), LUMBAR: ICD-10-CM

## 2023-06-09 DIAGNOSIS — M48.062 SPINAL STENOSIS OF LUMBAR REGION WITH NEUROGENIC CLAUDICATION: Primary | ICD-10-CM

## 2023-06-09 PROCEDURE — 1125F AMNT PAIN NOTED PAIN PRSNT: CPT | Mod: CPTII,,, | Performed by: NURSE PRACTITIONER

## 2023-06-09 PROCEDURE — 1101F PT FALLS ASSESS-DOCD LE1/YR: CPT | Mod: CPTII,,, | Performed by: NURSE PRACTITIONER

## 2023-06-09 PROCEDURE — 3074F SYST BP LT 130 MM HG: CPT | Mod: CPTII,,, | Performed by: NURSE PRACTITIONER

## 2023-06-09 PROCEDURE — 1159F MED LIST DOCD IN RCRD: CPT | Mod: CPTII,,, | Performed by: NURSE PRACTITIONER

## 2023-06-09 PROCEDURE — 3078F PR MOST RECENT DIASTOLIC BLOOD PRESSURE < 80 MM HG: ICD-10-PCS | Mod: CPTII,,, | Performed by: NURSE PRACTITIONER

## 2023-06-09 PROCEDURE — 3288F FALL RISK ASSESSMENT DOCD: CPT | Mod: CPTII,,, | Performed by: NURSE PRACTITIONER

## 2023-06-09 PROCEDURE — 99204 PR OFFICE/OUTPT VISIT, NEW, LEVL IV, 45-59 MIN: ICD-10-PCS | Mod: ,,, | Performed by: NURSE PRACTITIONER

## 2023-06-09 PROCEDURE — 3288F PR FALLS RISK ASSESSMENT DOCUMENTED: ICD-10-PCS | Mod: CPTII,,, | Performed by: NURSE PRACTITIONER

## 2023-06-09 PROCEDURE — 1101F PR PT FALLS ASSESS DOC 0-1 FALLS W/OUT INJ PAST YR: ICD-10-PCS | Mod: CPTII,,, | Performed by: NURSE PRACTITIONER

## 2023-06-09 PROCEDURE — 99204 OFFICE O/P NEW MOD 45 MIN: CPT | Mod: ,,, | Performed by: NURSE PRACTITIONER

## 2023-06-09 PROCEDURE — 3074F PR MOST RECENT SYSTOLIC BLOOD PRESSURE < 130 MM HG: ICD-10-PCS | Mod: CPTII,,, | Performed by: NURSE PRACTITIONER

## 2023-06-09 PROCEDURE — 1125F PR PAIN SEVERITY QUANTIFIED, PAIN PRESENT: ICD-10-PCS | Mod: CPTII,,, | Performed by: NURSE PRACTITIONER

## 2023-06-09 PROCEDURE — 1159F PR MEDICATION LIST DOCUMENTED IN MEDICAL RECORD: ICD-10-PCS | Mod: CPTII,,, | Performed by: NURSE PRACTITIONER

## 2023-06-09 PROCEDURE — 3078F DIAST BP <80 MM HG: CPT | Mod: CPTII,,, | Performed by: NURSE PRACTITIONER

## 2023-06-09 RX ORDER — GABAPENTIN 300 MG/1
300 CAPSULE ORAL 3 TIMES DAILY
Qty: 90 CAPSULE | Refills: 3 | Status: SHIPPED | OUTPATIENT
Start: 2023-06-09 | End: 2023-08-23

## 2023-06-09 NOTE — PROGRESS NOTES
Subjective:      Patient ID: Charlotte Choi is a 77 y.o. female.    Chief Complaint: Back Pain (Referred by Dr Oneil, walking with Rolator, no prior injury or surgery, completed P.T., prescription medication for relief, pain level 9/10)    Referred by: Pawan Oneil DO     HPI:Patient presents as a new consult to evaluate and treat spinal stenosis of lumbar region with neurogenic claudication.  Pain started spontaneously around 10 years ago without an accident.  Current pain is located to bilateral low back with radiation to bilateral buttocks left greater than right.  This radiates down the back of her legs to bilateral calves.  She also reports some knee pain describes pain as sticking and pulling.  Pressure with standing.  Her pain today as 9/10 and will typically stay at that level regardless of activity.  Her pain is worse when she walks and tries to attempt household chores as her legs feel heavy and she has to lean forward.  Sitting too long, standing too long also exacerbate pain.  Her sleep is interrupted due to the pain and she has temporary relief of pain when she lays down for a short period.    She completed physical therapy several years ago for 8 weeks provided her some help however she is not been doing any of the exercises.    MRI lumbar spine from 2018 notes L4-L5: Severe canal and moderate bilateral foraminal narrowing related to symmetric disc bulge and facet hypertrophy.   L5-S1: Moderate right and severe left foraminal narrowing related to left  asymmetric disc bulge and facet hypertrophy.  No significant canal narrowing.    Pain medications include Norco every 6 hours as needed , gabapentin 300 mg daily for pain, ibuprofen 600 mg every 12 hours as needed.      Interventional Pain History  None    ROS: Pain medications include Norco every 6 hours as needed , gabapentin 300 mg daily for pain, ibuprofen 600 mg every 12 hours as needed.    MRI lumbar spine 2018: (5 years ago)       DISCUSSION:     Number of non-rib bearing lumbar vertebral bodies: 5.     Alignment: Straightening of the normal lordosis. No scoliosis.  Soft tissues: No signal abnormalities.  Posterior paraspinal muscles: Mild fatty atrophy.  Conus medullaris: Normal, ends at T12-L1.  Cauda equina: No masses or arachnoiditis. Nerve roots are redundant  cranial to the L3-4 level.     Vertebrae:   No fractures, infection or neoplasm.       Degenerative changes:  Loss of disc height and T2 signal at all levels.     L1-L2:   Severe canal and moderate bilateral foraminal narrowing related to  symmetric disc bulge and facet hypertrophy.     L2-L3:   Severe canal and moderate bilateral foraminal narrowing related to  symmetric disc bulge and facet hypertrophy.     L3-L4:  Severe canal and moderate bilateral foraminal narrowing related to  symmetric disc bulge and facet hypertrophy.     L4-L5:   Severe canal and moderate bilateral foraminal narrowing related to  symmetric disc bulge and facet hypertrophy.     L5-S1:   Moderate right and severe left foraminal narrowing related to left  asymmetric disc bulge and facet hypertrophy.  No significant canal narrowing.     IMPRESSION:      1. Overall degenerative findings have slightly progressed when  compared to the previous lumbar spine MRI 5/25/2016.     2. Severe degenerative canal stenosis from L1 through L5. Cauda equina  compression is greatest from L3 to L5.     3. Moderate to severe multilevel neuroforaminal stenoses as detailed.     4. No disc herniations.             Objective:          Physical Exam  General: Well developed; overweight; A&O x 3; No anxiety/depression; NAD  Mental Status: Oriented to person, palce and time. Displays appropriate mood & affect.  Head: Norm cephalic and atraumatic  Neck: Midline trachea  Eyes: normal conjunctiva, normal lids, normal pupils  ENT and mouth: normal external ear, nose, and no lesions noted on the lips.  Respiratory: Symmetrical,  Unlabored. No dyspnea  CV: normal  S1/S2, normal rhythm and rate. No peripheral edema.   Abdomen: Non-distended    Extremities:  Gen: No cyanosis or tenderness to palpation bilateral upper and lower extremities  Skin: Warm, pink, dry, no rashes, no lesions on the lumbar spine  Strength: 5/5 motor strength bilateral upper and lower  ROM: Full ROM in bilateral knees and ankles without pain or instability.    Neuro:  Gait: forward lean w/rollator. Altalgic gait.  normal toe and heel raise  DTR's: 2+ in bilateral patellar, and ankle  Sensory: Intact to light touch bilateral  upper and lower extremities    Spine: Normal lordosis. No scoliosis  L-spine ROM: limited and painful with lumbar  flexion, extension, bilateral rotation,   Straight Leg Raise: + bilaterally  SI Joint: No tenderness to palpation bilaterally.             Assessment:     Current pain is located to bilateral low back with radiation to bilateral buttocks left greater than right.  This radiates down the back of her legs to bilateral calves.  She also reports some knee pain describes pain as sticking and pulling.  Pressure with standing.  Her pain today as 9/10 and will typically stay at that level regardless of activity.  Her pain is worse when she walks and tries to attempt household chores as her legs feel heavy and she has to lean forward.  Sitting too long, standing too long also exacerbate pain.  Her sleep is interrupted due to the pain and she has temporary relief of pain when she lays down for a short period.    She completed physical therapy several years ago for 8 weeks provided her some help however she is not been doing any of the exercises.    Most recent MRI lumbar spine from 2018 notes L4-L5: Severe canal and moderate bilateral foraminal narrowing related to symmetric disc bulge and facet hypertrophy.   L5-S1: Moderate right and severe left foraminal narrowing related to left  asymmetric disc bulge and facet hypertrophy.  No significant  canal narrowing.    Increased gabapentin to 300 mg TID    PT ordered MTS Ora eval and treat low back pain w/sciatica. 6-8 weeks    Follow up in 8 weeks to evlauate pain after PT    Encounter Diagnosis   Name Primary?    Spinal stenosis of lumbar region with neurogenic claudication Yes         Plan:       Charlotte was seen today for back pain.    Diagnoses and all orders for this visit:    Spinal stenosis of lumbar region with neurogenic claudication  -     Ambulatory referral/consult to Pain Clinic                   Past Medical History:   Diagnosis Date    Diabetes mellitus     Hypertension        No past surgical history on file.    Family History   Problem Relation Age of Onset    Diabetes Mother     Hypertension Mother     Mental retardation Mother     Cancer Father     Mental illness Sister     Hypertension Sister     Diabetes Brother     Stroke Brother        Social History     Socioeconomic History    Marital status:    Occupational History    Occupation: retired   Tobacco Use    Smoking status: Former    Smokeless tobacco: Former   Substance and Sexual Activity    Alcohol use: Not Currently    Drug use: Not Currently    Sexual activity: Yes       Current Outpatient Medications   Medication Sig Dispense Refill    albuterol (PROVENTIL HFA) 90 mcg/actuation inhaler Inhale 2 puffs into the lungs every 6 (six) hours as needed for Shortness of Breath. Rescue 18 g 2    amLODIPine (NORVASC) 10 MG tablet Take 0.5 tablets (5 mg total) by mouth once daily. 30 tablet 4    atorvastatin (LIPITOR) 40 MG tablet Take 1 tablet (40 mg total) by mouth once daily. 30 tablet 4    DULoxetine (CYMBALTA) 20 MG capsule Take 1 capsule (20 mg total) by mouth once daily. 30 capsule 11    fluticasone propionate (FLONASE) 50 mcg/actuation nasal spray 2 sprays (100 mcg total) by Each Nostril route once daily at 6am. 15 g 0    gabapentin (NEURONTIN) 300 MG capsule Take 1 capsule (300 mg total) by mouth once daily. for 14 days 14  capsule 0    HYDROcodone-acetaminophen (NORCO) 7.5-325 mg per tablet Take 1 tablet by mouth every 6 (six) hours as needed for Pain. 60 tablet 0    ibuprofen (ADVIL,MOTRIN) 600 MG tablet SMARTSI Tablet(s) By Mouth Every 12 Hours PRN      latanoprost 0.005 % ophthalmic solution Place 1 drop into both eyes once daily.      lisinopriL (PRINIVIL,ZESTRIL) 5 MG tablet Take 1 tablet (5 mg total) by mouth once daily. 90 tablet 3    metFORMIN (GLUCOPHAGE) 500 MG tablet Take 1 tablet (500 mg total) by mouth 2 (two) times daily. 60 tablet 4    metoprolol succinate (TOPROL-XL) 25 MG 24 hr tablet Take 2 tablets (50 mg total) by mouth once daily. 30 tablet 3    triamterene-hydrochlorothiazide 75-50 mg (MAXZIDE) 75-50 mg per tablet Take 1 tablet by mouth once daily. 30 tablet 4     No current facility-administered medications for this visit.       Review of patient's allergies indicates:   Allergen Reactions    Iodine Swelling

## 2023-08-22 DIAGNOSIS — I10 HYPERTENSION, UNSPECIFIED TYPE: ICD-10-CM

## 2023-08-22 RX ORDER — METOPROLOL SUCCINATE 25 MG/1
50 TABLET, EXTENDED RELEASE ORAL DAILY
Qty: 30 TABLET | Refills: 3 | Status: SHIPPED | OUTPATIENT
Start: 2023-08-22 | End: 2023-09-20

## 2023-08-23 DIAGNOSIS — M54.16 LUMBAR RADICULOPATHY: ICD-10-CM

## 2023-08-23 DIAGNOSIS — M48.062 SPINAL STENOSIS OF LUMBAR REGION WITH NEUROGENIC CLAUDICATION: Primary | ICD-10-CM

## 2023-08-23 RX ORDER — GABAPENTIN 300 MG/1
CAPSULE ORAL
Qty: 90 CAPSULE | Refills: 3 | Status: SHIPPED | OUTPATIENT
Start: 2023-08-23 | End: 2023-09-20

## 2023-09-18 ENCOUNTER — LAB VISIT (OUTPATIENT)
Dept: LAB | Facility: HOSPITAL | Age: 78
End: 2023-09-18
Attending: INTERNAL MEDICINE
Payer: MEDICARE

## 2023-09-18 DIAGNOSIS — E11.9 TYPE 2 DIABETES MELLITUS WITHOUT COMPLICATION, WITHOUT LONG-TERM CURRENT USE OF INSULIN: ICD-10-CM

## 2023-09-18 DIAGNOSIS — E78.5 HYPERLIPIDEMIA, UNSPECIFIED HYPERLIPIDEMIA TYPE: ICD-10-CM

## 2023-09-18 DIAGNOSIS — I10 HYPERTENSION, UNSPECIFIED TYPE: ICD-10-CM

## 2023-09-18 DIAGNOSIS — I10 PRIMARY HYPERTENSION: ICD-10-CM

## 2023-09-18 LAB
ALBUMIN SERPL-MCNC: 3.7 G/DL (ref 3.4–4.8)
ALBUMIN/GLOB SERPL: 0.9 RATIO (ref 1.1–2)
ALP SERPL-CCNC: 48 UNIT/L (ref 40–150)
ALT SERPL-CCNC: 23 UNIT/L (ref 0–55)
AST SERPL-CCNC: 24 UNIT/L (ref 5–34)
BASOPHILS # BLD AUTO: 0.08 X10(3)/MCL
BASOPHILS NFR BLD AUTO: 0.9 %
BILIRUB SERPL-MCNC: 0.4 MG/DL
BUN SERPL-MCNC: 14.2 MG/DL (ref 9.8–20.1)
CALCIUM SERPL-MCNC: 9.4 MG/DL (ref 8.4–10.2)
CHLORIDE SERPL-SCNC: 109 MMOL/L (ref 98–107)
CHOLEST SERPL-MCNC: 170 MG/DL
CHOLEST/HDLC SERPL: 4 {RATIO} (ref 0–5)
CO2 SERPL-SCNC: 26 MMOL/L (ref 23–31)
CREAT SERPL-MCNC: 1.08 MG/DL (ref 0.55–1.02)
CREAT UR-MCNC: 287.4 MG/DL (ref 45–106)
EOSINOPHIL # BLD AUTO: 0.3 X10(3)/MCL (ref 0–0.9)
EOSINOPHIL NFR BLD AUTO: 3.4 %
ERYTHROCYTE [DISTWIDTH] IN BLOOD BY AUTOMATED COUNT: 13.5 % (ref 11.5–17)
GFR SERPLBLD CREATININE-BSD FMLA CKD-EPI: 52 MLS/MIN/1.73/M2
GLOBULIN SER-MCNC: 4.3 GM/DL (ref 2.4–3.5)
GLUCOSE SERPL-MCNC: 100 MG/DL (ref 82–115)
HCT VFR BLD AUTO: 41.4 % (ref 37–47)
HDLC SERPL-MCNC: 39 MG/DL (ref 35–60)
HGB BLD-MCNC: 12.9 G/DL (ref 12–16)
IMM GRANULOCYTES # BLD AUTO: 0.03 X10(3)/MCL (ref 0–0.04)
IMM GRANULOCYTES NFR BLD AUTO: 0.3 %
LDLC SERPL CALC-MCNC: 98 MG/DL (ref 50–140)
LYMPHOCYTES # BLD AUTO: 3.67 X10(3)/MCL (ref 0.6–4.6)
LYMPHOCYTES NFR BLD AUTO: 42 %
MCH RBC QN AUTO: 26.3 PG (ref 27–31)
MCHC RBC AUTO-ENTMCNC: 31.2 G/DL (ref 33–36)
MCV RBC AUTO: 84.5 FL (ref 80–94)
MONOCYTES # BLD AUTO: 0.89 X10(3)/MCL (ref 0.1–1.3)
MONOCYTES NFR BLD AUTO: 10.2 %
NEUTROPHILS # BLD AUTO: 3.77 X10(3)/MCL (ref 2.1–9.2)
NEUTROPHILS NFR BLD AUTO: 43.2 %
NRBC BLD AUTO-RTO: 0 %
PLATELET # BLD AUTO: 271 X10(3)/MCL (ref 130–400)
PMV BLD AUTO: 11.5 FL (ref 7.4–10.4)
POTASSIUM SERPL-SCNC: 4 MMOL/L (ref 3.5–5.1)
PROT SERPL-MCNC: 8 GM/DL (ref 5.8–7.6)
PROT UR STRIP-MCNC: 16.8 MG/DL
RBC # BLD AUTO: 4.9 X10(6)/MCL (ref 4.2–5.4)
SODIUM SERPL-SCNC: 143 MMOL/L (ref 136–145)
TRIGL SERPL-MCNC: 166 MG/DL (ref 37–140)
URINE PROTEIN/CREATININE RATIO (OHS): 0.1
VLDLC SERPL CALC-MCNC: 33 MG/DL
WBC # SPEC AUTO: 8.74 X10(3)/MCL (ref 4.5–11.5)

## 2023-09-18 PROCEDURE — 82570 ASSAY OF URINE CREATININE: CPT

## 2023-09-18 PROCEDURE — 36415 COLL VENOUS BLD VENIPUNCTURE: CPT

## 2023-09-18 PROCEDURE — 80061 LIPID PANEL: CPT

## 2023-09-18 PROCEDURE — 85025 COMPLETE CBC W/AUTO DIFF WBC: CPT

## 2023-09-18 PROCEDURE — 80053 COMPREHEN METABOLIC PANEL: CPT

## 2023-09-20 ENCOUNTER — CLINICAL SUPPORT (OUTPATIENT)
Dept: INTERNAL MEDICINE | Facility: CLINIC | Age: 78
End: 2023-09-20
Attending: INTERNAL MEDICINE
Payer: MEDICARE

## 2023-09-20 ENCOUNTER — OFFICE VISIT (OUTPATIENT)
Dept: INTERNAL MEDICINE | Facility: CLINIC | Age: 78
End: 2023-09-20
Payer: MEDICARE

## 2023-09-20 VITALS
BODY MASS INDEX: 35.23 KG/M2 | OXYGEN SATURATION: 95 % | HEART RATE: 91 BPM | WEIGHT: 206.38 LBS | SYSTOLIC BLOOD PRESSURE: 136 MMHG | RESPIRATION RATE: 20 BRPM | DIASTOLIC BLOOD PRESSURE: 82 MMHG | HEIGHT: 64 IN | TEMPERATURE: 98 F

## 2023-09-20 DIAGNOSIS — R20.0 RIGHT FACIAL NUMBNESS: Primary | ICD-10-CM

## 2023-09-20 DIAGNOSIS — E11.9 TYPE 2 DIABETES MELLITUS WITHOUT COMPLICATION, WITHOUT LONG-TERM CURRENT USE OF INSULIN: ICD-10-CM

## 2023-09-20 DIAGNOSIS — I10 PRIMARY HYPERTENSION: ICD-10-CM

## 2023-09-20 DIAGNOSIS — M81.0 AGE-RELATED OSTEOPOROSIS WITHOUT CURRENT PATHOLOGICAL FRACTURE: ICD-10-CM

## 2023-09-20 LAB
HBA1C MFR BLD: 6.9 %
LEFT EYE DM RETINOPATHY: NEGATIVE
RIGHT EYE DM RETINOPATHY: NEGATIVE

## 2023-09-20 PROCEDURE — 92228 IMG RTA DETC/MNTR DS PHY/QHP: CPT | Mod: PBBFAC

## 2023-09-20 PROCEDURE — G0008 ADMIN INFLUENZA VIRUS VAC: HCPCS | Mod: PBBFAC

## 2023-09-20 PROCEDURE — 83036 HEMOGLOBIN GLYCOSYLATED A1C: CPT | Mod: PBBFAC | Performed by: INTERNAL MEDICINE

## 2023-09-20 PROCEDURE — 99214 OFFICE O/P EST MOD 30 MIN: CPT | Mod: PBBFAC,25 | Performed by: INTERNAL MEDICINE

## 2023-09-20 RX ORDER — GABAPENTIN 300 MG/1
300 CAPSULE ORAL 3 TIMES DAILY
Qty: 90 CAPSULE | Refills: 11 | Status: SHIPPED | OUTPATIENT
Start: 2023-09-20 | End: 2024-09-19

## 2023-09-20 RX ORDER — METFORMIN HYDROCHLORIDE 500 MG/1
500 TABLET ORAL 2 TIMES DAILY WITH MEALS
Qty: 180 TABLET | Refills: 3 | Status: SHIPPED | OUTPATIENT
Start: 2023-09-20 | End: 2024-09-19

## 2023-09-20 RX ORDER — AMLODIPINE BESYLATE 10 MG/1
10 TABLET ORAL DAILY
Qty: 30 TABLET | Refills: 11 | Status: SHIPPED | OUTPATIENT
Start: 2023-09-20 | End: 2024-01-10 | Stop reason: SDUPTHER

## 2023-09-20 RX ORDER — ALBUTEROL SULFATE 90 UG/1
2 AEROSOL, METERED RESPIRATORY (INHALATION) EVERY 6 HOURS PRN
Qty: 18 G | Refills: 0 | Status: SHIPPED | OUTPATIENT
Start: 2023-09-20

## 2023-09-20 RX ORDER — FLUTICASONE PROPIONATE 50 MCG
1 SPRAY, SUSPENSION (ML) NASAL DAILY
Qty: 16 G | Refills: 0 | Status: SHIPPED | OUTPATIENT
Start: 2023-09-20

## 2023-09-20 RX ORDER — LISINOPRIL 5 MG/1
5 TABLET ORAL DAILY
Qty: 90 TABLET | Refills: 3 | Status: SHIPPED | OUTPATIENT
Start: 2023-09-20 | End: 2024-01-10 | Stop reason: SDUPTHER

## 2023-09-20 RX ORDER — ATORVASTATIN CALCIUM 40 MG/1
40 TABLET, FILM COATED ORAL DAILY
Qty: 90 TABLET | Refills: 3 | Status: SHIPPED | OUTPATIENT
Start: 2023-09-20 | End: 2024-09-19

## 2023-09-20 RX ORDER — METOPROLOL SUCCINATE 50 MG/1
50 TABLET, EXTENDED RELEASE ORAL DAILY
Qty: 30 TABLET | Refills: 11 | Status: SHIPPED | OUTPATIENT
Start: 2023-09-20 | End: 2024-09-19

## 2023-09-20 RX ORDER — TRIAMTERENE AND HYDROCHLOROTHIAZIDE 75; 50 MG/1; MG/1
1 TABLET ORAL DAILY
Qty: 30 TABLET | Refills: 4 | Status: SHIPPED | OUTPATIENT
Start: 2023-09-20 | End: 2024-01-10 | Stop reason: SDUPTHER

## 2023-09-20 NOTE — PROGRESS NOTES
[facility]    Chief Complaint  Chief Complaint   Patient presents with    Follow-up     Pt here today for f/u visit. Pt continues to  have pain to back and knees and numbness noted her fingers.        HPI  Charlotte Choi is a 77 y.o. female who presents to clinic today for follow-up of chronic medical conditions.      Since last visit she reports continued back pain and leg pain but pain is at baseline. Seen by pain management. Gabapentin increased to 300 TID. Still having pain though. Will see Pain management again on 9/27. Also complaining of right sided facial numbness again that is intermittent. No trauma to incite this. Has been ongoing for 2-3 months now. No pain or loss of sensation. No muscle weakness    ROS  Review of Systems   Constitutional:  Negative for chills and fever.   Respiratory:  Negative for cough and shortness of breath.    Cardiovascular:  Negative for chest pain.   Genitourinary:  Negative for dysuria.   Musculoskeletal:  Positive for back pain and neck pain.   Neurological:  Positive for tingling.           PE  Vitals:    09/20/23 1253   BP: (!) 146/69   Pulse: 91   Resp: 20   Temp: 98.1 °F (36.7 °C)      Physical Exam  Physical Exam  Vitals and nursing note reviewed.   Constitutional:       General: She is not in acute distress.     Appearance: Normal appearance. She is normal weight. She is not ill-appearing or toxic-appearing.   HENT:      Head: Normocephalic and atraumatic.   Eyes:      Extraocular Movements: Extraocular movements intact.      Conjunctiva/sclera: Conjunctivae normal.      Pupils: Pupils are equal, round, and reactive to light.   Cardiovascular:      Rate and Rhythm: Normal rate and regular rhythm.      Pulses: Normal pulses.      Heart sounds: Normal heart sounds. No murmur heard.     No gallop.   Pulmonary:      Effort: Pulmonary effort is normal. No respiratory distress.      Breath sounds: Normal breath sounds. No stridor. No wheezing, rhonchi or rales.    Musculoskeletal:         General: No swelling.      Right lower leg: No edema.      Left lower leg: No edema.   Skin:     General: Skin is warm and dry.   Neurological:      General: No focal deficit present.      Mental Status: She is alert and oriented to person, place, and time.      Comments: Sensation intact throughout face  Intact muscles of facial expression  Slight droop to right  Face symmetric           Assessment/Plan  Type 2 Diabetes  A1c today 6.9.   Continue Metformin and monitor CBG  Has diabetic retinopathy but followed by CIELO cohen  Has done Diabetic urine screen  Diabetic eye exam today    Right Sided Facial   MRI of brain w and w/o to r/o nerve entrapment    Primary Hypertension  Blood pressure remains elevated today. Did not take her medications again prior to coming today  Continue Norvasc, lisinopril, HCTZ-triamterene, and metoprolol. Refilled today    Neck pain/Shoulder pain  Lumbar Stenosis with neurogenic claudication  She does not wish to be referred to a neurosurgeon for additional evaluation  CT C-spine:  Spondylosis as above including uncovertebral spurring and foraminal narrowing C3-C4 and C4-C5  Gabapentin and Duloxetine  Was sent to physical Therapy by pain management. Pain med re-eval 9/27    Hyperlipidemia  Continue Lipitor 40      Health Maintenance:  Flu vaccine today  Pawan Oneil,   Internal Medicine - PGY-3

## 2023-09-20 NOTE — PROGRESS NOTES
Faculty addendum:     I have reviewed the patients history, residents  findings on physical examination, diagnosis and treatment plan. Care provided was reasonable and necessary.     Suspect trigeminal nerve entrapment, get MRI of the brain

## 2023-09-21 NOTE — PROGRESS NOTES
Charlotte Choi is a 78 y.o. female here for a diabetic eye screening with non-dilated fundus photos per Pawan Oneil DO.    Patient cooperative?: Yes  Small pupils?: No  Last eye exam: unknown    For exam results, see Encounter Report.

## 2023-10-11 ENCOUNTER — HOSPITAL ENCOUNTER (OUTPATIENT)
Dept: RADIOLOGY | Facility: HOSPITAL | Age: 78
Discharge: HOME | End: 2023-10-11
Attending: INTERNAL MEDICINE
Payer: MEDICARE

## 2023-10-11 DIAGNOSIS — M81.0 AGE-RELATED OSTEOPOROSIS WITHOUT CURRENT PATHOLOGICAL FRACTURE: ICD-10-CM

## 2023-10-11 DIAGNOSIS — R20.0 RIGHT FACIAL NUMBNESS: ICD-10-CM

## 2023-10-11 PROCEDURE — 77080 DXA BONE DENSITY AXIAL: CPT | Mod: TC

## 2023-10-11 NOTE — PROGRESS NOTES
Patient not able to complete MRI brain w/wo contrast today due to claustrophobia. Patient expressed she doesn't like being closed up and that her  just passed away. Patient was given number to reschedule exam and contact provider for anxiety medication if needed.

## 2023-10-12 ENCOUNTER — OFFICE VISIT (OUTPATIENT)
Dept: PAIN MEDICINE | Facility: CLINIC | Age: 78
End: 2023-10-12
Payer: MEDICARE

## 2023-10-12 VITALS
HEIGHT: 64 IN | DIASTOLIC BLOOD PRESSURE: 89 MMHG | BODY MASS INDEX: 35.85 KG/M2 | HEART RATE: 78 BPM | WEIGHT: 210 LBS | SYSTOLIC BLOOD PRESSURE: 150 MMHG

## 2023-10-12 DIAGNOSIS — M51.36 DDD (DEGENERATIVE DISC DISEASE), LUMBAR: ICD-10-CM

## 2023-10-12 DIAGNOSIS — M48.062 SPINAL STENOSIS OF LUMBAR REGION WITH NEUROGENIC CLAUDICATION: Primary | ICD-10-CM

## 2023-10-12 PROCEDURE — 3077F SYST BP >= 140 MM HG: CPT | Mod: CPTII,,, | Performed by: NURSE PRACTITIONER

## 2023-10-12 PROCEDURE — 99214 PR OFFICE/OUTPT VISIT, EST, LEVL IV, 30-39 MIN: ICD-10-PCS | Mod: ,,, | Performed by: NURSE PRACTITIONER

## 2023-10-12 PROCEDURE — 99214 OFFICE O/P EST MOD 30 MIN: CPT | Mod: ,,, | Performed by: NURSE PRACTITIONER

## 2023-10-12 PROCEDURE — 1101F PR PT FALLS ASSESS DOC 0-1 FALLS W/OUT INJ PAST YR: ICD-10-PCS | Mod: CPTII,,, | Performed by: NURSE PRACTITIONER

## 2023-10-12 PROCEDURE — 1101F PT FALLS ASSESS-DOCD LE1/YR: CPT | Mod: CPTII,,, | Performed by: NURSE PRACTITIONER

## 2023-10-12 PROCEDURE — 3079F DIAST BP 80-89 MM HG: CPT | Mod: CPTII,,, | Performed by: NURSE PRACTITIONER

## 2023-10-12 PROCEDURE — 1159F MED LIST DOCD IN RCRD: CPT | Mod: CPTII,,, | Performed by: NURSE PRACTITIONER

## 2023-10-12 PROCEDURE — 1125F PR PAIN SEVERITY QUANTIFIED, PAIN PRESENT: ICD-10-PCS | Mod: CPTII,,, | Performed by: NURSE PRACTITIONER

## 2023-10-12 PROCEDURE — 1159F PR MEDICATION LIST DOCUMENTED IN MEDICAL RECORD: ICD-10-PCS | Mod: CPTII,,, | Performed by: NURSE PRACTITIONER

## 2023-10-12 PROCEDURE — 3288F FALL RISK ASSESSMENT DOCD: CPT | Mod: CPTII,,, | Performed by: NURSE PRACTITIONER

## 2023-10-12 PROCEDURE — 1125F AMNT PAIN NOTED PAIN PRSNT: CPT | Mod: CPTII,,, | Performed by: NURSE PRACTITIONER

## 2023-10-12 PROCEDURE — 3077F PR MOST RECENT SYSTOLIC BLOOD PRESSURE >= 140 MM HG: ICD-10-PCS | Mod: CPTII,,, | Performed by: NURSE PRACTITIONER

## 2023-10-12 PROCEDURE — 3288F PR FALLS RISK ASSESSMENT DOCUMENTED: ICD-10-PCS | Mod: CPTII,,, | Performed by: NURSE PRACTITIONER

## 2023-10-12 PROCEDURE — 3079F PR MOST RECENT DIASTOLIC BLOOD PRESSURE 80-89 MM HG: ICD-10-PCS | Mod: CPTII,,, | Performed by: NURSE PRACTITIONER

## 2023-10-12 RX ORDER — METHYLPREDNISOLONE 4 MG/1
TABLET ORAL
Qty: 21 EACH | Refills: 0 | Status: SHIPPED | OUTPATIENT
Start: 2023-10-12 | End: 2023-11-02

## 2023-10-12 NOTE — PROGRESS NOTES
Subjective:      Patient ID: Charlotte Choi is a 78 y.o. female.    Chief Complaint: Low-back Pain (8 weeks f/u w/ sciatica 9/20/23 C/O pain level 9, not taking pain meds, pt ambulating with a rollator.states pain same as last visit.)    Referred by: No ref. provider found     HPI this is a pleasant 78-year-old female who presents as a 8 week follow-up for back pain with sciatica after completing 8 weeks of PT. unfortunately patient relayed she did not receive any relief and her pain level is the same. Completed a month of PT with 5% relief of pain.  She has 2 weeks of physical therapy left.  She has not been doing any home exercises and reported she was not given any home exercises for from her therapist.    Pain started spontaneously around 10 years ago without an accident.  Current pain is located to bilateral low back with radiation to bilateral buttocks left greater than right.  This radiates down the back of her legs to bilateral calves L>R.  She also reports some knee pain describes pain as sticking and pulling.  Pressure with standing.  Her pain today as 9/10 and will typically stay at that level regardless of activity.  Her pain is worse when she walks and tries to attempt household chores as her legs feel heavy and she has to lean forward.  Sitting too long, standing too long also exacerbate pain.  Her sleep is interrupted due to the pain and she has temporary relief of pain when she lays down for a short period       MRI lumbar spine from 2018 notes L4-L5: Severe canal and moderate bilateral foraminal narrowing related to symmetric disc bulge and facet hypertrophy.   L5-S1: Moderate right and severe left foraminal narrowing related to left  asymmetric disc bulge and facet hypertrophy.  No significant canal narrowing.     Pain medications include Norco every 6 hours as needed (Rx) completed , gabapentin 300 mg daily for pain, ibuprofen 600 mg every 12 hours as needed.  Crea elevated 1.08    Vital signs:  "  Vitals:    10/12/23 1333 10/12/23 1334   BP: (!) 150/89    Pulse: 78    Weight: 95.3 kg (210 lb)    Height: 5' 4" (1.626 m)    PainSc:    5     Body mass index is 36.05 kg/m².  Pain Disability Index (PDI): 53       Interventional Pain History  None     ROS low back and leg pain       MRI lumbar spine 2018: (5 years ago)  Alignment: Straightening of the normal lordosis. No scoliosis.  Soft tissues: No signal abnormalities.  Posterior paraspinal muscles: Mild fatty atrophy.  Conus medullaris: Normal, ends at T12-L1.  Cauda equina: No masses or arachnoiditis. Nerve roots are redundant  cranial to the L3-4 level.     Vertebrae:   No fractures, infection or neoplasm.       Degenerative changes:  Loss of disc height and T2 signal at all levels.     L1-L2:   Severe canal and moderate bilateral foraminal narrowing related to  symmetric disc bulge and facet hypertrophy.     L2-L3:   Severe canal and moderate bilateral foraminal narrowing related to  symmetric disc bulge and facet hypertrophy.     L3-L4:  Severe canal and moderate bilateral foraminal narrowing related to  symmetric disc bulge and facet hypertrophy.     L4-L5:   Severe canal and moderate bilateral foraminal narrowing related to  symmetric disc bulge and facet hypertrophy.     L5-S1:   Moderate right and severe left foraminal narrowing related to left  asymmetric disc bulge and facet hypertrophy.  No significant canal narrowing.     IMPRESSION:      1. Overall degenerative findings have slightly progressed when  compared to the previous lumbar spine MRI 5/25/2016.     2. Severe degenerative canal stenosis from L1 through L5. Cauda equina  compression is greatest from L3 to L5.     3. Moderate to severe multilevel neuroforaminal stenoses as detailed.     4. No disc herniations.           Objective:          Physical Exam  General: Well developed; overweight; A&O x 3; No anxiety/depression; NAD  Mental Status: Oriented to person, palce and time. Displays " appropriate mood & affect.  Head: Norm cephalic and atraumatic  Neck: Midline trachea  Eyes: normal conjunctiva, normal lids, normal pupils  ENT and mouth: normal external ear, nose, and no lesions noted on the lips.  Respiratory: Symmetrical, Unlabored. No dyspnea  CV: normal rhythm and rate. No peripheral edema.   Abdomen: Non-distended     Extremities:  Gen: No cyanosis or tenderness to palpation bilateral upper and lower extremities  Skin: Warm, pink, dry, no rashes, no lesions on the lumbar spine  Strength: 5/5 motor strength bilateral upper and lower  ROM: Full ROM in bilateral knees and ankles without pain or instability.     Neuro:  Gait: forward lean w/rollator. Altalgic gait.  With Rollator.  normal toe and heel raise  DTR's: 2+ in bilateral patellar, and ankle  Sensory: Intact to light touch bilateral  upper and lower extremities     Spine: Normal lordosis. No scoliosis  L-spine ROM: limited and painful with lumbar  flexion, extension, bilateral rotation,   Straight Leg Raise: + bilaterally  SI Joint: No tenderness to palpation bilaterally.            Assessment:     A/P:PT provided 5% pain relief. Ongoing pain to low back radiating to bilateral posterior legs. Pain score today 8/10.     MRI lumbar spine from 2018 notes L4-L5: Severe canal and moderate bilateral foraminal narrowing related to symmetric disc bulge and facet hypertrophy.   L5-S1: Moderate right and severe left foraminal narrowing related to left  asymmetric disc bulge and facet hypertrophy.  No significant canal narrowing.    No interest in Bilateral TFESI S1. She will continue remaining 2 weeks at PT and ask for home exercises. She will f/u with PCP to ask if she can have Norco filled  She will reduce NSAIDs tro no more than 600 mg total in a day as crea >  She will continue Gabapentin.     Rx sent for medrol dose pack. Normal A1c. No fractures or osteoporosis.     Follow up in 1 luis to evaluate. Back pain w/sciatica.                 Encounter Diagnosis   Name Primary?    Spinal stenosis of lumbar region with neurogenic claudication Yes         Plan:       Charlotte was seen today for low-back pain.    Diagnoses and all orders for this visit:    Spinal stenosis of lumbar region with neurogenic claudication               Past Medical History:   Diagnosis Date    Diabetes mellitus     Hypertension        History reviewed. No pertinent surgical history.    Family History   Problem Relation Age of Onset    Diabetes Mother     Hypertension Mother     Mental retardation Mother     Cancer Father     Mental illness Sister     Hypertension Sister     Diabetes Brother     Stroke Brother        Social History     Socioeconomic History    Marital status:    Occupational History    Occupation: retired   Tobacco Use    Smoking status: Former    Smokeless tobacco: Former   Substance and Sexual Activity    Alcohol use: Not Currently    Drug use: Not Currently    Sexual activity: Yes       Current Outpatient Medications   Medication Sig Dispense Refill    albuterol (PROAIR HFA) 90 mcg/actuation inhaler Inhale 2 puffs into the lungs every 6 (six) hours as needed for Wheezing. Rescue 18 g 0    albuterol (PROVENTIL HFA) 90 mcg/actuation inhaler Inhale 2 puffs into the lungs every 6 (six) hours as needed for Shortness of Breath. Rescue 18 g 2    amLODIPine (NORVASC) 10 MG tablet Take 1 tablet (10 mg total) by mouth once daily. 30 tablet 11    atorvastatin (LIPITOR) 40 MG tablet Take 1 tablet (40 mg total) by mouth once daily. 90 tablet 3    DULoxetine (CYMBALTA) 20 MG capsule Take 1 capsule (20 mg total) by mouth once daily. 30 capsule 11    fluticasone propionate (FLONASE) 50 mcg/actuation nasal spray 1 spray (50 mcg total) by Each Nostril route once daily. 16 g 0    gabapentin (NEURONTIN) 300 MG capsule Take 1 capsule (300 mg total) by mouth 3 (three) times daily. 90 capsule 11    HYDROcodone-acetaminophen (NORCO) 7.5-325 mg per tablet Take 1  tablet by mouth every 6 (six) hours as needed for Pain. 60 tablet 0    ibuprofen (ADVIL,MOTRIN) 600 MG tablet SMARTSI Tablet(s) By Mouth Every 12 Hours PRN      latanoprost 0.005 % ophthalmic solution Place 1 drop into both eyes once daily.      lisinopriL (PRINIVIL,ZESTRIL) 5 MG tablet Take 1 tablet (5 mg total) by mouth once daily. 90 tablet 3    metFORMIN (GLUCOPHAGE) 500 MG tablet Take 1 tablet (500 mg total) by mouth 2 (two) times daily with meals. 180 tablet 3    metoprolol succinate (TOPROL-XL) 50 MG 24 hr tablet Take 1 tablet (50 mg total) by mouth once daily. 30 tablet 11    triamterene-hydrochlorothiazide 75-50 mg (MAXZIDE) 75-50 mg per tablet Take 1 tablet by mouth once daily. 30 tablet 4     No current facility-administered medications for this visit.       Review of patient's allergies indicates:   Allergen Reactions    Iodine Swelling

## 2023-10-19 ENCOUNTER — TELEPHONE (OUTPATIENT)
Dept: INTERNAL MEDICINE | Facility: CLINIC | Age: 78
End: 2023-10-19

## 2023-12-12 ENCOUNTER — TELEPHONE (OUTPATIENT)
Dept: INTERNAL MEDICINE | Facility: CLINIC | Age: 78
End: 2023-12-12
Payer: MEDICARE

## 2023-12-12 RX ORDER — ALPRAZOLAM 0.25 MG/1
0.25 TABLET ORAL ONCE
Qty: 1 TABLET | Refills: 0 | Status: SHIPPED | OUTPATIENT
Start: 2023-12-12 | End: 2023-12-12

## 2023-12-12 NOTE — TELEPHONE ENCOUNTER
Contacted pt via phone. ID by name and . Informed pt that her Rx for Xanax 0.25 mg was sent to [facility].Pt given scheduling number to reschedule her MRI.

## 2023-12-13 RX ORDER — HYDROCODONE BITARTRATE AND ACETAMINOPHEN 7.5; 325 MG/1; MG/1
1 TABLET ORAL EVERY 6 HOURS PRN
Qty: 60 TABLET | Refills: 0 | OUTPATIENT
Start: 2023-12-13

## 2023-12-29 ENCOUNTER — HOSPITAL ENCOUNTER (OUTPATIENT)
Dept: RADIOLOGY | Facility: HOSPITAL | Age: 78
Discharge: HOME | End: 2023-12-29
Attending: INTERNAL MEDICINE
Payer: MEDICARE

## 2023-12-29 NOTE — PROGRESS NOTES
Patient unable to complete MRI brain w/wo contrast today even with anxiety medication. Patient started crying stating she did not want to do this and wanted to get up. Patient instructed to contract provider for further workup.

## 2024-01-10 ENCOUNTER — OFFICE VISIT (OUTPATIENT)
Dept: INTERNAL MEDICINE | Facility: CLINIC | Age: 79
End: 2024-01-10
Payer: MEDICARE

## 2024-01-10 VITALS
TEMPERATURE: 98 F | SYSTOLIC BLOOD PRESSURE: 174 MMHG | HEIGHT: 64 IN | HEART RATE: 72 BPM | BODY MASS INDEX: 35.31 KG/M2 | RESPIRATION RATE: 18 BRPM | DIASTOLIC BLOOD PRESSURE: 76 MMHG | WEIGHT: 206.81 LBS

## 2024-01-10 DIAGNOSIS — E11.9 TYPE 2 DIABETES MELLITUS WITHOUT COMPLICATION, WITHOUT LONG-TERM CURRENT USE OF INSULIN: Primary | ICD-10-CM

## 2024-01-10 DIAGNOSIS — I10 PRIMARY HYPERTENSION: ICD-10-CM

## 2024-01-10 DIAGNOSIS — R20.0 RIGHT FACIAL NUMBNESS: ICD-10-CM

## 2024-01-10 DIAGNOSIS — M48.062 SPINAL STENOSIS OF LUMBAR REGION WITH NEUROGENIC CLAUDICATION: ICD-10-CM

## 2024-01-10 LAB — HBA1C MFR BLD: NORMAL %

## 2024-01-10 PROCEDURE — 99214 OFFICE O/P EST MOD 30 MIN: CPT | Mod: PBBFAC | Performed by: INTERNAL MEDICINE

## 2024-01-10 PROCEDURE — 83036 HEMOGLOBIN GLYCOSYLATED A1C: CPT | Mod: PBBFAC | Performed by: INTERNAL MEDICINE

## 2024-01-10 RX ORDER — TRIAMTERENE AND HYDROCHLOROTHIAZIDE 75; 50 MG/1; MG/1
1 TABLET ORAL DAILY
Qty: 30 TABLET | Refills: 4 | Status: SHIPPED | OUTPATIENT
Start: 2024-01-10

## 2024-01-10 RX ORDER — HYDROCODONE BITARTRATE AND ACETAMINOPHEN 7.5; 325 MG/1; MG/1
1 TABLET ORAL EVERY 6 HOURS PRN
Qty: 30 TABLET | Refills: 0 | Status: SHIPPED | OUTPATIENT
Start: 2024-01-10

## 2024-01-10 RX ORDER — AMLODIPINE BESYLATE 10 MG/1
10 TABLET ORAL DAILY
Qty: 30 TABLET | Refills: 11 | Status: SHIPPED | OUTPATIENT
Start: 2024-01-10 | End: 2025-01-09

## 2024-01-10 RX ORDER — LISINOPRIL 20 MG/1
20 TABLET ORAL DAILY
Qty: 90 TABLET | Refills: 3 | Status: SHIPPED | OUTPATIENT
Start: 2024-01-10 | End: 2025-01-09

## 2024-01-10 NOTE — LETTER
I certify that (Name) Charlotte Choi meets the requirements as outlined in #  (shown on reverse side) and qualifies for a mobility impaired license plate/hang-tag. I further understand that willful and false certification shall subject me to fines/imprisonment as outlined in R.S. 47:463.4 (G) (4). The applicant's information is as follows:    YOB: 1945            Race:Black or         Gender:Female    Address:  [address] [place] [address]    City:[place]                               State:[place]     Zip Code:[address]     []Permanently Impaired - Applicant has a total or lifelong condition of mobility impairment from which little or no improvement or recovery can reasonably be expected. A medical examiners certification is required on initial application only.      [] Temporarily Impaired - Applicant has a temporary condition of mobility impairment of which improvement or recovery can reasonably be expected. Applicant is entitled to a hangtag, which will be valid for one (1) year. A medical examiners certification is required for the renewal of the hangtag      [] Unable to appear in person at the Office of Motor Vehicles - Applicant must bring facial photo        Medical Examiner's Signature________________________________________ Date:1/10/24_________________________    Printed Name:___________________________________________________    State License #_____________________________    Address: [facility]. [address]                                     Phone Number: [phone]                                                                                                                                                                                                                  City: [place]__________________________________ State: [place] __Zip Code: [address] _______________    TO BE COMPLETED BY MOTOR VEHICLE ANALYST ONLY    FLORIN   Lic. Plate #      Hangtag Control #   Hangtag ID #       Date Issued:    #:   Office #:

## 2024-01-10 NOTE — PROGRESS NOTES
[facility]    Chief Complaint  Chief Complaint   Patient presents with    Follow-up       HPI  Charlotte Choi is a 77 y.o. female who presents to clinic today for follow-up of chronic medical conditions.      Since last visit she was unable to get the MRI done to looks for nerve entrapment leading to her neurological complaints despite getting xanax for anxiety. Also continues to have lower back pain. Saw pain management but no longer seeing them.     ROS  Review of Systems   Constitutional:  Negative for chills and fever.   Respiratory:  Negative for cough and shortness of breath.    Cardiovascular:  Negative for chest pain.   Genitourinary:  Negative for dysuria.   Musculoskeletal:  Negative for back pain and neck pain.   Neurological:  Negative for tingling.           PE  Vitals:    01/10/24 0930   BP: (!) 176/80   Pulse: 69   Resp: 18   Temp: 98.1 °F (36.7 °C)        Physical Exam  Physical Exam  Vitals and nursing note reviewed.   Constitutional:       General: She is not in acute distress.     Appearance: Normal appearance. She is normal weight. She is not ill-appearing or toxic-appearing.   HENT:      Head: Normocephalic and atraumatic.   Eyes:      Extraocular Movements: Extraocular movements intact.      Conjunctiva/sclera: Conjunctivae normal.      Pupils: Pupils are equal, round, and reactive to light.   Cardiovascular:      Rate and Rhythm: Normal rate and regular rhythm.      Pulses: Normal pulses.      Heart sounds: Normal heart sounds. No murmur heard.     No gallop.   Pulmonary:      Effort: Pulmonary effort is normal. No respiratory distress.      Breath sounds: Normal breath sounds. No stridor. No wheezing, rhonchi or rales.   Musculoskeletal:         General: No swelling.      Right lower leg: No edema.      Left lower leg: No edema.   Skin:     General: Skin is warm and dry.   Neurological:      General: No focal deficit present.      Mental Status: She is alert and oriented to person, place,  and time.           Assessment/Plan  Type 2 Diabetes with retinopathy  A1c today 7.3  Continue Metformin and monitor CBG  Followed by CIELO cohen    Right Sided Facial   MRI of brain w and w/o to r/o nerve entrapment. Despite getting xanax she was unable to get this done. Would like to try going to open MRI facility    Primary Hypertension  Blood pressure 176/80. Did not take medications today  Continue Norvasc, lisinopril, HCTZ-triamterene, and metoprolol. Refilled today    Neck pain/Shoulder pain  Lumbar Stenosis with neurogenic claudication  No longer seeing pain medicine  30 script for NORCO    Hyperlipidemia  Continue Lipitor 40      Pawan Oneil, DO

## 2024-01-11 ENCOUNTER — TELEPHONE (OUTPATIENT)
Dept: INTERNAL MEDICINE | Facility: CLINIC | Age: 79
End: 2024-01-11
Payer: MEDICARE

## 2024-01-11 RX ORDER — TRIAMCINOLONE ACETONIDE 1 MG/G
CREAM TOPICAL 2 TIMES DAILY
Qty: 15 G | Refills: 1 | Status: SHIPPED | OUTPATIENT
Start: 2024-01-11

## 2024-01-11 RX ORDER — TRIAMCINOLONE ACETONIDE 1 MG/G
1 CREAM TOPICAL 2 TIMES DAILY
COMMUNITY

## 2024-03-06 ENCOUNTER — OFFICE VISIT (OUTPATIENT)
Dept: INTERNAL MEDICINE | Facility: CLINIC | Age: 79
End: 2024-03-06
Payer: MEDICARE

## 2024-03-06 ENCOUNTER — LAB VISIT (OUTPATIENT)
Dept: LAB | Facility: HOSPITAL | Age: 79
End: 2024-03-06
Attending: INTERNAL MEDICINE
Payer: MEDICARE

## 2024-03-06 VITALS
HEIGHT: 64 IN | RESPIRATION RATE: 19 BRPM | DIASTOLIC BLOOD PRESSURE: 62 MMHG | WEIGHT: 202 LBS | TEMPERATURE: 98 F | SYSTOLIC BLOOD PRESSURE: 134 MMHG | HEART RATE: 83 BPM | OXYGEN SATURATION: 96 % | BODY MASS INDEX: 34.49 KG/M2

## 2024-03-06 DIAGNOSIS — R30.0 DYSURIA: ICD-10-CM

## 2024-03-06 DIAGNOSIS — R30.0 DYSURIA: Primary | ICD-10-CM

## 2024-03-06 DIAGNOSIS — M48.061 SPINAL STENOSIS OF LUMBAR REGION WITHOUT NEUROGENIC CLAUDICATION: ICD-10-CM

## 2024-03-06 LAB
ALBUMIN SERPL-MCNC: 4.1 G/DL (ref 3.4–4.8)
ALBUMIN/GLOB SERPL: 0.9 RATIO (ref 1.1–2)
ALP SERPL-CCNC: 67 UNIT/L (ref 40–150)
ALT SERPL-CCNC: 19 UNIT/L (ref 0–55)
APPEARANCE UR: ABNORMAL
AST SERPL-CCNC: 17 UNIT/L (ref 5–34)
BACTERIA #/AREA URNS AUTO: ABNORMAL /HPF
BASOPHILS # BLD AUTO: 0.06 X10(3)/MCL
BASOPHILS NFR BLD AUTO: 0.6 %
BILIRUB SERPL-MCNC: 0.5 MG/DL
BILIRUB UR QL STRIP.AUTO: NEGATIVE
BUN SERPL-MCNC: 15 MG/DL (ref 9.8–20.1)
CALCIUM SERPL-MCNC: 9.8 MG/DL (ref 8.4–10.2)
CHLORIDE SERPL-SCNC: 104 MMOL/L (ref 98–107)
CO2 SERPL-SCNC: 30 MMOL/L (ref 23–31)
COLOR UR AUTO: ABNORMAL
CREAT SERPL-MCNC: 0.86 MG/DL (ref 0.55–1.02)
EOSINOPHIL # BLD AUTO: 0.15 X10(3)/MCL (ref 0–0.9)
EOSINOPHIL NFR BLD AUTO: 1.5 %
ERYTHROCYTE CLUMPS [#/AREA] IN URINE BY COMPUTER ASSISTED METHOD: ABNORMAL /HPF
ERYTHROCYTE [DISTWIDTH] IN BLOOD BY AUTOMATED COUNT: 13.3 % (ref 11.5–17)
GFR SERPLBLD CREATININE-BSD FMLA CKD-EPI: >60 MLS/MIN/1.73/M2
GLOBULIN SER-MCNC: 4.7 GM/DL (ref 2.4–3.5)
GLUCOSE SERPL-MCNC: 123 MG/DL (ref 82–115)
GLUCOSE UR QL STRIP.AUTO: ABNORMAL
HCT VFR BLD AUTO: 46.2 % (ref 37–47)
HGB BLD-MCNC: 14.6 G/DL (ref 12–16)
HYALINE CASTS #/AREA URNS LPF: ABNORMAL /LPF
IMM GRANULOCYTES # BLD AUTO: 0.04 X10(3)/MCL (ref 0–0.04)
IMM GRANULOCYTES NFR BLD AUTO: 0.4 %
KETONES UR QL STRIP.AUTO: ABNORMAL
LEUKOCYTE ESTERASE UR QL STRIP.AUTO: 500
LYMPHOCYTES # BLD AUTO: 3.18 X10(3)/MCL (ref 0.6–4.6)
LYMPHOCYTES NFR BLD AUTO: 30.9 %
MCH RBC QN AUTO: 26.8 PG (ref 27–31)
MCHC RBC AUTO-ENTMCNC: 31.6 G/DL (ref 33–36)
MCV RBC AUTO: 84.8 FL (ref 80–94)
MONOCYTES # BLD AUTO: 0.64 X10(3)/MCL (ref 0.1–1.3)
MONOCYTES NFR BLD AUTO: 6.2 %
MUCOUS THREADS URNS QL MICRO: ABNORMAL /LPF
NEUTROPHILS # BLD AUTO: 6.21 X10(3)/MCL (ref 2.1–9.2)
NEUTROPHILS NFR BLD AUTO: 60.4 %
NITRITE UR QL STRIP.AUTO: ABNORMAL
NRBC BLD AUTO-RTO: 0 %
PH UR STRIP.AUTO: 6.5 [PH]
PLATELET # BLD AUTO: 307 X10(3)/MCL (ref 130–400)
PMV BLD AUTO: 10.9 FL (ref 7.4–10.4)
POTASSIUM SERPL-SCNC: 3.9 MMOL/L (ref 3.5–5.1)
PROT SERPL-MCNC: 8.8 GM/DL (ref 5.8–7.6)
PROT UR QL STRIP.AUTO: ABNORMAL
RBC # BLD AUTO: 5.45 X10(6)/MCL (ref 4.2–5.4)
RBC #/AREA URNS AUTO: >100 /HPF
RBC UR QL AUTO: ABNORMAL
SODIUM SERPL-SCNC: 141 MMOL/L (ref 136–145)
SP GR UR STRIP.AUTO: 1.02 (ref 1–1.03)
SQUAMOUS #/AREA URNS LPF: ABNORMAL /HPF
UROBILINOGEN UR STRIP-ACNC: NORMAL
WBC # SPEC AUTO: 10.28 X10(3)/MCL (ref 4.5–11.5)
WBC #/AREA URNS AUTO: ABNORMAL /HPF
WBC CLUMPS UR QL AUTO: ABNORMAL

## 2024-03-06 PROCEDURE — 81001 URINALYSIS AUTO W/SCOPE: CPT

## 2024-03-06 PROCEDURE — 87086 URINE CULTURE/COLONY COUNT: CPT

## 2024-03-06 PROCEDURE — 85025 COMPLETE CBC W/AUTO DIFF WBC: CPT

## 2024-03-06 PROCEDURE — 80053 COMPREHEN METABOLIC PANEL: CPT

## 2024-03-06 PROCEDURE — 99215 OFFICE O/P EST HI 40 MIN: CPT | Mod: PBBFAC | Performed by: INTERNAL MEDICINE

## 2024-03-06 PROCEDURE — 36415 COLL VENOUS BLD VENIPUNCTURE: CPT

## 2024-03-06 RX ORDER — NITROFURANTOIN 25; 75 MG/1; MG/1
100 CAPSULE ORAL 2 TIMES DAILY
Qty: 10 CAPSULE | Refills: 0 | Status: SHIPPED | OUTPATIENT
Start: 2024-03-06 | End: 2024-03-11

## 2024-03-06 NOTE — PROGRESS NOTES
[facility]    Chief Complaint  Chief Complaint   Patient presents with    Follow-up     Pt here today with c/o symptoms of UTI. C/O frequency, pain , oder and itch . Started this Sunday.        HPI  Charlotte Choi is a 77 y.o. female who presents to clinic today for follow-up of chronic medical conditions.      Since last visit she was unable to get the MRI done to looks for nerve entrapment leading to her neurological complaints despite getting xanax for anxiety. She has an appointment at a facility with an OPEN MRI. I would like her to get this done as she has persistent symptoms.  She also has complaints of a possible UTI since last Sunday.   Also has low urine output with lower abdominal pain.   No fever, chills, nausea, diarrhea, constipation.   Has dysuria, malodorous urine, burning with urination.     ROS  Review of Systems   Constitutional:  Negative for chills and fever.   Respiratory:  Negative for cough and shortness of breath.    Cardiovascular:  Negative for chest pain.   Gastrointestinal:  Negative for abdominal pain, nausea and vomiting.   Genitourinary:  Positive for dysuria and frequency. Negative for flank pain and hematuria.   Musculoskeletal:  Negative for back pain and neck pain.   Neurological:  Negative for tingling.           PE  Vitals:    03/06/24 0855   BP: (!) 149/87   Pulse: 83   Resp: 19   Temp: 98.1 °F (36.7 °C)          Physical Exam  Physical Exam  Vitals and nursing note reviewed.   Constitutional:       General: She is not in acute distress.     Appearance: Normal appearance. She is normal weight. She is not ill-appearing or toxic-appearing.   HENT:      Head: Normocephalic and atraumatic.   Eyes:      Extraocular Movements: Extraocular movements intact.      Conjunctiva/sclera: Conjunctivae normal.      Pupils: Pupils are equal, round, and reactive to light.   Cardiovascular:      Rate and Rhythm: Normal rate and regular rhythm.      Pulses: Normal pulses.      Heart sounds:  Normal heart sounds. No murmur heard.     No gallop.   Pulmonary:      Effort: Pulmonary effort is normal. No respiratory distress.      Breath sounds: Normal breath sounds. No stridor. No wheezing, rhonchi or rales.   Musculoskeletal:         General: No swelling.      Right lower leg: No edema.      Left lower leg: No edema.   Skin:     General: Skin is warm and dry.   Neurological:      General: No focal deficit present.      Mental Status: She is alert and oriented to person, place, and time.           Assessment/Plan  Type 2 Diabetes with retinopathy  A1c 7.3  Continue Metformin and monitor CBG  Followed by CIELO cohen    Right Sided Facial   MRI of brain w and w/o to r/o nerve entrapment.   Tried to MRI with xanax but she was not able to complete.   She has an appointment to have this done at a facility with an open air MRI.     Primary Hypertension  Blood pressure 176/80.   Continue Norvasc, lisinopril, HCTZ-triamterene, and metoprolol    Neck pain/Shoulder pain  Lumbar Stenosis with neurogenic claudication  Send to pain medicine    Hyperlipidemia  Continue Lipitor 40    UTI?   Script for Macrobid  UA. Will call if abnormal  CBC/CMP        Pawan Oneil, DO  Internal Medicine - PGY-3

## 2024-03-08 ENCOUNTER — ANESTHESIA EVENT (OUTPATIENT)
Dept: SURGERY | Facility: HOSPITAL | Age: 79
End: 2024-03-08
Payer: MEDICARE

## 2024-03-08 LAB
BACTERIA UR CULT: ABNORMAL
BACTERIA UR CULT: ABNORMAL

## 2024-03-11 ENCOUNTER — TELEPHONE (OUTPATIENT)
Dept: INTERNAL MEDICINE | Facility: CLINIC | Age: 79
End: 2024-03-11
Payer: MEDICARE

## 2024-03-11 RX ORDER — CIPROFLOXACIN 500 MG/1
500 TABLET ORAL 2 TIMES DAILY
Qty: 6 TABLET | Refills: 0 | OUTPATIENT
Start: 2024-03-11 | End: 2024-03-13

## 2024-03-11 NOTE — TELEPHONE ENCOUNTER
UA with preus and ecoli  Prescribed macrobid at visit but with culture resultsprescribed cipro as the aforementioned bacteria are sensitive  I attempted to ricardo the patient but was unable to reach her.   I left a voice mail stating as such and suggested she call if she had further questions        Pawan Oneil, DO  Internal Medicine

## 2024-03-13 ENCOUNTER — HOSPITAL ENCOUNTER (EMERGENCY)
Facility: HOSPITAL | Age: 79
Discharge: HOME | End: 2024-03-13
Attending: EMERGENCY MEDICINE
Payer: MEDICARE

## 2024-03-13 VITALS
TEMPERATURE: 98 F | SYSTOLIC BLOOD PRESSURE: 150 MMHG | HEIGHT: 65 IN | HEART RATE: 82 BPM | OXYGEN SATURATION: 100 % | WEIGHT: 198.44 LBS | BODY MASS INDEX: 33.06 KG/M2 | RESPIRATION RATE: 18 BRPM | DIASTOLIC BLOOD PRESSURE: 80 MMHG

## 2024-03-13 DIAGNOSIS — N30.91 CYSTITIS WITH HEMATURIA: Primary | ICD-10-CM

## 2024-03-13 LAB
ALBUMIN SERPL-MCNC: 3.6 G/DL (ref 3.4–4.8)
ALBUMIN/GLOB SERPL: 0.8 RATIO (ref 1.1–2)
ALP SERPL-CCNC: 56 UNIT/L (ref 40–150)
ALT SERPL-CCNC: 16 UNIT/L (ref 0–55)
APPEARANCE UR: ABNORMAL
AST SERPL-CCNC: 16 UNIT/L (ref 5–34)
BASOPHILS # BLD AUTO: 0.08 X10(3)/MCL
BASOPHILS NFR BLD AUTO: 0.9 %
BILIRUB SERPL-MCNC: 0.3 MG/DL
BILIRUB UR QL STRIP.AUTO: ABNORMAL
BUN SERPL-MCNC: 14.3 MG/DL (ref 9.8–20.1)
CALCIUM SERPL-MCNC: 9.9 MG/DL (ref 8.4–10.2)
CHLORIDE SERPL-SCNC: 106 MMOL/L (ref 98–107)
CO2 SERPL-SCNC: 29 MMOL/L (ref 23–31)
COLOR UR AUTO: ABNORMAL
CREAT SERPL-MCNC: 0.85 MG/DL (ref 0.55–1.02)
EOSINOPHIL # BLD AUTO: 0.22 X10(3)/MCL (ref 0–0.9)
EOSINOPHIL NFR BLD AUTO: 2.3 %
ERYTHROCYTE [DISTWIDTH] IN BLOOD BY AUTOMATED COUNT: 13.1 % (ref 11.5–17)
GFR SERPLBLD CREATININE-BSD FMLA CKD-EPI: >60 MLS/MIN/1.73/M2
GLOBULIN SER-MCNC: 4.6 GM/DL (ref 2.4–3.5)
GLUCOSE SERPL-MCNC: 124 MG/DL (ref 82–115)
GLUCOSE UR QL STRIP.AUTO: ABNORMAL
HCT VFR BLD AUTO: 42 % (ref 37–47)
HGB BLD-MCNC: 13.3 G/DL (ref 12–16)
HOLD SPECIMEN: NORMAL
IMM GRANULOCYTES # BLD AUTO: 0.02 X10(3)/MCL (ref 0–0.04)
IMM GRANULOCYTES NFR BLD AUTO: 0.2 %
KETONES UR QL STRIP.AUTO: ABNORMAL
LEUKOCYTE ESTERASE UR QL STRIP.AUTO: ABNORMAL
LIPASE SERPL-CCNC: 67 U/L
LYMPHOCYTES # BLD AUTO: 3.42 X10(3)/MCL (ref 0.6–4.6)
LYMPHOCYTES NFR BLD AUTO: 36.4 %
MCH RBC QN AUTO: 27 PG (ref 27–31)
MCHC RBC AUTO-ENTMCNC: 31.7 G/DL (ref 33–36)
MCV RBC AUTO: 85.4 FL (ref 80–94)
MONOCYTES # BLD AUTO: 0.76 X10(3)/MCL (ref 0.1–1.3)
MONOCYTES NFR BLD AUTO: 8.1 %
NEUTROPHILS # BLD AUTO: 4.9 X10(3)/MCL (ref 2.1–9.2)
NEUTROPHILS NFR BLD AUTO: 52.1 %
NITRITE UR QL STRIP.AUTO: ABNORMAL
NRBC BLD AUTO-RTO: 0 %
PH UR STRIP.AUTO: ABNORMAL [PH]
PLATELET # BLD AUTO: 318 X10(3)/MCL (ref 130–400)
PMV BLD AUTO: 10.6 FL (ref 7.4–10.4)
POTASSIUM SERPL-SCNC: 4.1 MMOL/L (ref 3.5–5.1)
PROT SERPL-MCNC: 8.2 GM/DL (ref 5.8–7.6)
PROT UR QL STRIP.AUTO: ABNORMAL
RBC # BLD AUTO: 4.92 X10(6)/MCL (ref 4.2–5.4)
RBC #/AREA URNS AUTO: >100 /HPF
RBC UR QL AUTO: ABNORMAL
SODIUM SERPL-SCNC: 140 MMOL/L (ref 136–145)
SP GR UR STRIP.AUTO: 1.02 (ref 1–1.03)
UROBILINOGEN UR STRIP-ACNC: ABNORMAL
WBC # SPEC AUTO: 9.4 X10(3)/MCL (ref 4.5–11.5)
WBC #/AREA URNS AUTO: ABNORMAL /HPF

## 2024-03-13 PROCEDURE — 83690 ASSAY OF LIPASE: CPT | Performed by: NURSE PRACTITIONER

## 2024-03-13 PROCEDURE — 25000003 PHARM REV CODE 250: Performed by: PHYSICIAN ASSISTANT

## 2024-03-13 PROCEDURE — 80053 COMPREHEN METABOLIC PANEL: CPT | Performed by: NURSE PRACTITIONER

## 2024-03-13 PROCEDURE — 63600175 PHARM REV CODE 636 W HCPCS: Performed by: PHYSICIAN ASSISTANT

## 2024-03-13 PROCEDURE — 96372 THER/PROPH/DIAG INJ SC/IM: CPT | Performed by: PHYSICIAN ASSISTANT

## 2024-03-13 PROCEDURE — 85025 COMPLETE CBC W/AUTO DIFF WBC: CPT | Performed by: NURSE PRACTITIONER

## 2024-03-13 PROCEDURE — 87086 URINE CULTURE/COLONY COUNT: CPT | Performed by: NURSE PRACTITIONER

## 2024-03-13 PROCEDURE — 99284 EMERGENCY DEPT VISIT MOD MDM: CPT | Mod: 25

## 2024-03-13 PROCEDURE — 81001 URINALYSIS AUTO W/SCOPE: CPT | Performed by: NURSE PRACTITIONER

## 2024-03-13 RX ORDER — CEFTRIAXONE 500 MG/1
500 INJECTION, POWDER, FOR SOLUTION INTRAMUSCULAR; INTRAVENOUS
Status: COMPLETED | OUTPATIENT
Start: 2024-03-13 | End: 2024-03-13

## 2024-03-13 RX ORDER — LIDOCAINE HYDROCHLORIDE 10 MG/ML
5 INJECTION, SOLUTION EPIDURAL; INFILTRATION; INTRACAUDAL; PERINEURAL
Status: COMPLETED | OUTPATIENT
Start: 2024-03-13 | End: 2024-03-13

## 2024-03-13 RX ORDER — CEPHALEXIN 500 MG/1
500 CAPSULE ORAL 2 TIMES DAILY
Qty: 14 CAPSULE | Refills: 0 | Status: SHIPPED | OUTPATIENT
Start: 2024-03-13 | End: 2024-03-20

## 2024-03-13 RX ADMIN — LIDOCAINE HYDROCHLORIDE 50 MG: 10 INJECTION, SOLUTION EPIDURAL; INFILTRATION; INTRACAUDAL; PERINEURAL at 12:03

## 2024-03-13 RX ADMIN — CEFTRIAXONE SODIUM 500 MG: 500 INJECTION, POWDER, FOR SOLUTION INTRAMUSCULAR; INTRAVENOUS at 12:03

## 2024-03-13 NOTE — FIRST PROVIDER EVALUATION
"Medical screening examination initiated.  I have conducted a focused provider triage encounter, findings are as follows:    Brief history of present illness:  vaginal bleeding, lower abdominal pain    Vitals:    03/13/24 1050   BP: (!) 156/84   BP Location: Right arm   Patient Position: Sitting   Pulse: 80   Resp: 18   Temp: 98.2 °F (36.8 °C)   TempSrc: Oral   SpO2: 100%   Weight: 90 kg (198 lb 6.6 oz)   Height: 5' 4.96" (1.65 m)       Pertinent physical exam:  deferred    Brief workup plan:  labwork    Preliminary workup initiated; this workup will be continued and followed by the physician or advanced practice provider that is assigned to the patient when roomed.  "

## 2024-03-13 NOTE — DISCHARGE INSTRUCTIONS
Report to Emergency Department if symptoms return or worsen; [facility] Medicine Clinic Within 1 to 2 days, It is important that you follow up with your primary care provider or specialist if indicated for further evaluation, workup, and treatment as necessary. The exam and treatment you received in Emergency Department was for an urgent problem and NOT INTENDED AS COMPLETE CARE. It is important that you FOLLOW UP with a doctor for ongoing care. If your symptoms become WORSE or you DO NOT IMPROVE and you are unable to reach your health care provider, you should RETURN to the Emergency Department. The Emergency Department provider has provided a PRELIMINARY INTERPRETATION of all your studies. A final interpretation may be done after you are discharged. If a change in your diagnosis or treatment is needed WE WILL CONTACT YOU. It is critical that we have a CURRENT PHONE NUMBER FOR YOU.

## 2024-03-13 NOTE — ED PROVIDER NOTES
Encounter Date: 3/13/2024       History     Chief Complaint   Patient presents with    Vaginal Bleeding    Abdominal Pain     Pt reports vaginal bleeding and lower abdominal pressure x 2 days. Recently finished antibiotics for UTI.      78-year-old female with past medical history significant for diabetes, hypertension, hyperlipidemia and recent UTI presents to ED complaining of 2 day history of hematuria, lower abdominal pressure and urinary frequency.  Patient was seen in clinic 7 days ago and diagnosed with a UTI and discharged with Macrobid.  However, C&S revealed Proteus mirabilis that was resistant to Macrobid.  Patient's PCP attempted to call her in prescription for Cipro, however patient reports she never received the call or this prescription.  Patient adamantly denies any vaginal bleeding outside of with urination.  Patient reports she wears pads for urge incontinence and states she has seen no blood on her pads.  Patient denies abdominal pain, abdominal distention, nausea, vomiting, back pain, fever, chills.  Vital signs stable on arrival, patient in no acute distress.      Review of patient's allergies indicates:   Allergen Reactions    Iodine Swelling     Past Medical History:   Diagnosis Date    Diabetes mellitus     Hypertension      Past Surgical History:   Procedure Laterality Date    HYSTERECTOMY       Family History   Problem Relation Age of Onset    Diabetes Mother     Hypertension Mother     Intellectual disability Mother     Cancer Father     Mental illness Sister     Hypertension Sister     Diabetes Brother     Stroke Brother      Social History     Tobacco Use    Smoking status: Former    Smokeless tobacco: Former   Substance Use Topics    Alcohol use: Not Currently    Drug use: Not Currently     Review of Systems   All other systems reviewed and are negative.      Physical Exam     Initial Vitals [03/13/24 1050]   BP Pulse Resp Temp SpO2   (!) 156/84 80 18 98.2 °F (36.8 °C) 100 %      MAP        --         Physical Exam    Nursing note and vitals reviewed.  Constitutional: She appears well-developed and well-nourished. She is not diaphoretic. No distress.   HENT:   Head: Normocephalic and atraumatic.   Mouth/Throat: Oropharynx is clear and moist.   Eyes: Conjunctivae and EOM are normal. Pupils are equal, round, and reactive to light.   Neck: Neck supple.   Normal range of motion.  Cardiovascular:  Normal rate, regular rhythm, normal heart sounds and intact distal pulses.     Exam reveals no gallop and no friction rub.       No murmur heard.  Pulmonary/Chest: Breath sounds normal. No respiratory distress. She has no wheezes. She has no rhonchi. She has no rales.   Abdominal: Abdomen is soft. Bowel sounds are normal. She exhibits no distension. There is no abdominal tenderness.   No right CVA tenderness.  No left CVA tenderness. There is no rebound and no guarding.   Musculoskeletal:         General: No tenderness or edema. Normal range of motion.      Cervical back: Normal range of motion and neck supple.     Neurological: She is alert and oriented to person, place, and time. She has normal strength. GCS score is 15. GCS eye subscore is 4. GCS verbal subscore is 5. GCS motor subscore is 6.   Skin: Skin is warm and dry. Capillary refill takes less than 2 seconds. No rash noted.   Psychiatric: She has a normal mood and affect. Thought content normal.         ED Course   Procedures  Labs Reviewed   COMPREHENSIVE METABOLIC PANEL - Abnormal; Notable for the following components:       Result Value    Glucose Level 124 (*)     Protein Total 8.2 (*)     Globulin 4.6 (*)     Albumin/Globulin Ratio 0.8 (*)     All other components within normal limits   URINALYSIS, REFLEX TO URINE CULTURE - Abnormal; Notable for the following components:    Color, UA Red (*)     Appearance, UA Turbid (*)     WBC, UA 21-50 (*)     RBC, UA >100 (*)     All other components within normal limits   LIPASE - Abnormal; Notable for the  following components:    Lipase Level 67 (*)     All other components within normal limits   CBC WITH DIFFERENTIAL - Abnormal; Notable for the following components:    MCHC 31.7 (*)     MPV 10.6 (*)     All other components within normal limits   CULTURE, URINE   CBC W/ AUTO DIFFERENTIAL    Narrative:     The following orders were created for panel order CBC auto differential.  Procedure                               Abnormality         Status                     ---------                               -----------         ------                     CBC with Differential[[id number]]       Abnormal            Final result                 Please view results for these tests on the individual orders.   EXTRA TUBES    Narrative:     The following orders were created for panel order EXTRA TUBES.  Procedure                               Abnormality         Status                     ---------                               -----------         ------                     Light Blue Top Hold[[id number]]                             In process                 Red Top Hold[[id number]]                                    In process                 Gold Top Hold[[id number]]                                   In process                   Please view results for these tests on the individual orders.   LIGHT BLUE TOP HOLD   RED TOP HOLD   GOLD TOP HOLD          Imaging Results    None          Medications   cefTRIAXone injection 500 mg (has no administration in time range)   LIDOcaine (PF) 10 mg/ml (1%) injection 50 mg (has no administration in time range)     Medical Decision Making  Differential diagnosis: Includes but not limited to cystitis, pyelonephritis, kidney stone, AUB    ED management:  Patient given single dose of IM Rocephin 500 mg prior to discharge.    ED course:  UA reveals hematuria, but results otherwise obscured due to bleeding.  However, I have reviewed culture from clinic visit one-week ago and we will tailor  antibiotic treatment to these results.  CBC unremarkable, including normal H&H.  CMP unremarkable.  Lipase mildly elevated, patient denies any abdominal pain and has no epigastric tenderness, low suspicion for acute pancreatitis.  Patient has no CVA tenderness or systemic symptoms to suggest pyelonephritis.  Patient is nontoxic appearing with unremarkable vitals, stable for discharge.  Patient was previously prescribed Cipro, but due to patient's age and diabetes status I will change this to Keflex.  I discussed with patient importance of taking all of prescription even if symptoms improve.  I instructed patient to contact PCP today in order to schedule earliest possible follow-up appointment.  Strict ED precautions given for new or worsening symptoms patient verbalized understanding.  All test results explained and all questions answered.    Amount and/or Complexity of Data Reviewed  External Data Reviewed: labs and notes.     Details: Reviewed note and UA results from clinic visit one-week ago.  Labs: ordered. Decision-making details documented in ED Course.                                      Clinical Impression:  Final diagnoses:  [N30.91] Cystitis with hematuria (Primary)          ED Disposition Condition    Discharge Good          ED Prescriptions       Medication Sig Dispense Start Date End Date Auth. Provider    cephALEXin (KEFLEX) 500 MG capsule Take 1 capsule (500 mg total) by mouth 2 (two) times a day. for 7 days 14 capsule 3/13/2024 3/20/2024 Sarmad Elliott PA          Follow-up Information       Follow up With Specialties Details Why Contact Info    Pawan Oneil, DO Internal Medicine Call today  [address] [facility] [address]  [phone]      [facility] Emergency Medicine  As needed, If symptoms worsen [address] [facility] [id number] [phone]             Sarmad Elliott PA  03/13/24 1241

## 2024-03-15 ENCOUNTER — HOSPITAL ENCOUNTER (OUTPATIENT)
Dept: RADIOLOGY | Facility: HOSPITAL | Age: 79
Discharge: HOME | End: 2024-03-15
Attending: INTERNAL MEDICINE
Payer: MEDICARE

## 2024-03-15 ENCOUNTER — HOSPITAL ENCOUNTER (OUTPATIENT)
Facility: HOSPITAL | Age: 79
Discharge: HOME | End: 2024-03-15
Attending: ANESTHESIOLOGY | Admitting: ANESTHESIOLOGY
Payer: MEDICARE

## 2024-03-15 ENCOUNTER — ANESTHESIA (OUTPATIENT)
Dept: SURGERY | Facility: HOSPITAL | Age: 79
End: 2024-03-15
Payer: MEDICARE

## 2024-03-15 DIAGNOSIS — R20.0 NUMBNESS: ICD-10-CM

## 2024-03-15 DIAGNOSIS — R20.0 RIGHT FACIAL NUMBNESS: ICD-10-CM

## 2024-03-15 LAB
BACTERIA UR CULT: ABNORMAL
GLUCOSE SERPL-MCNC: 134 MG/DL (ref 70–110)
POCT GLUCOSE: 107 MG/DL (ref 70–110)

## 2024-03-15 PROCEDURE — D9220A PRA ANESTHESIA: Mod: CRNA,,, | Performed by: STUDENT IN AN ORGANIZED HEALTH CARE EDUCATION/TRAINING PROGRAM

## 2024-03-15 PROCEDURE — 37000008 HC ANESTHESIA 1ST 15 MINUTES: Performed by: ANESTHESIOLOGY

## 2024-03-15 PROCEDURE — 37000009 HC ANESTHESIA EA ADD 15 MINS: Performed by: ANESTHESIOLOGY

## 2024-03-15 PROCEDURE — 63600175 PHARM REV CODE 636 W HCPCS

## 2024-03-15 PROCEDURE — D9220A PRA ANESTHESIA: Mod: ANES,,, | Performed by: ANESTHESIOLOGY

## 2024-03-15 PROCEDURE — A9577 INJ MULTIHANCE: HCPCS | Performed by: INTERNAL MEDICINE

## 2024-03-15 PROCEDURE — 25500020 PHARM REV CODE 255: Performed by: INTERNAL MEDICINE

## 2024-03-15 PROCEDURE — 25000003 PHARM REV CODE 250

## 2024-03-15 PROCEDURE — 70553 MRI BRAIN STEM W/O & W/DYE: CPT | Mod: TC

## 2024-03-15 RX ORDER — PROPOFOL 10 MG/ML
INJECTION, EMULSION INTRAVENOUS
Status: DISCONTINUED | OUTPATIENT
Start: 2024-03-15 | End: 2024-03-15

## 2024-03-15 RX ORDER — LIDOCAINE HYDROCHLORIDE 20 MG/ML
INJECTION INTRAVENOUS
Status: DISCONTINUED | OUTPATIENT
Start: 2024-03-15 | End: 2024-03-15

## 2024-03-15 RX ORDER — SODIUM CHLORIDE, SODIUM GLUCONATE, SODIUM ACETATE, POTASSIUM CHLORIDE AND MAGNESIUM CHLORIDE 30; 37; 368; 526; 502 MG/100ML; MG/100ML; MG/100ML; MG/100ML; MG/100ML
INJECTION, SOLUTION INTRAVENOUS CONTINUOUS
Status: CANCELLED | OUTPATIENT
Start: 2024-03-15 | End: 2024-04-14

## 2024-03-15 RX ORDER — ONDANSETRON HYDROCHLORIDE 2 MG/ML
INJECTION, SOLUTION INTRAVENOUS
Status: DISCONTINUED | OUTPATIENT
Start: 2024-03-15 | End: 2024-03-15

## 2024-03-15 RX ORDER — DEXAMETHASONE SODIUM PHOSPHATE 4 MG/ML
INJECTION, SOLUTION INTRA-ARTICULAR; INTRALESIONAL; INTRAMUSCULAR; INTRAVENOUS; SOFT TISSUE
Status: DISCONTINUED | OUTPATIENT
Start: 2024-03-15 | End: 2024-03-15

## 2024-03-15 RX ORDER — LIDOCAINE HYDROCHLORIDE 10 MG/ML
1 INJECTION, SOLUTION EPIDURAL; INFILTRATION; INTRACAUDAL; PERINEURAL ONCE
Status: CANCELLED | OUTPATIENT
Start: 2024-03-15 | End: 2024-03-15

## 2024-03-15 RX ORDER — SODIUM CITRATE AND CITRIC ACID MONOHYDRATE 334; 500 MG/5ML; MG/5ML
30 SOLUTION ORAL
Status: CANCELLED | OUTPATIENT
Start: 2024-03-15

## 2024-03-15 RX ADMIN — GADOBENATE DIMEGLUMINE 17 ML: 529 INJECTION, SOLUTION INTRAVENOUS at 11:03

## 2024-03-15 RX ADMIN — PROPOFOL 150 MG: 10 INJECTION, EMULSION INTRAVENOUS at 10:03

## 2024-03-15 RX ADMIN — LIDOCAINE HYDROCHLORIDE 80 MG: 20 INJECTION INTRAVENOUS at 10:03

## 2024-03-15 RX ADMIN — ONDANSETRON 4 MG: 2 INJECTION INTRAMUSCULAR; INTRAVENOUS at 10:03

## 2024-03-15 RX ADMIN — DEXAMETHASONE SODIUM PHOSPHATE 4 MG: 4 INJECTION, SOLUTION INTRA-ARTICULAR; INTRALESIONAL; INTRAMUSCULAR; INTRAVENOUS; SOFT TISSUE at 10:03

## 2024-03-15 RX ADMIN — SODIUM CHLORIDE, SODIUM GLUCONATE, SODIUM ACETATE, POTASSIUM CHLORIDE AND MAGNESIUM CHLORIDE: 526; 502; 368; 37; 30 INJECTION, SOLUTION INTRAVENOUS at 10:03

## 2024-03-15 NOTE — ANESTHESIA PREPROCEDURE EVALUATION
03/15/2024  Charlotte Choi is a 78 y.o., female.     Needs GA due to claustrophobia      Pre-op Assessment    I have reviewed the Patient Summary Reports.     I have reviewed the Nursing Notes. I have reviewed the NPO Status.   I have reviewed the Medications.     Review of Systems  Cardiovascular:  Exercise tolerance: good   Hypertension                                  Hypertension         Endocrine:  Diabetes    Diabetes                          Physical Exam  General: Well nourished and Cooperative    Airway:  Mallampati: II   Mouth Opening: Normal  TM Distance: Normal  Tongue: Normal  Neck ROM: Normal ROM    Dental:  Dentures    Chest/Lungs:  Clear to auscultation    Heart:  Rhythm: Regular Rhythm        Anesthesia Plan  Type of Anesthesia, risks & benefits discussed:    Anesthesia Type: Gen Supraglottic Airway  Intra-op Monitoring Plan: Standard ASA Monitors  Post Op Pain Control Plan: multimodal analgesia  Induction:  IV  Informed Consent: Informed consent signed with the Patient and all parties understand the risks and agree with anesthesia plan.  All questions answered.   ASA Score: 2  Day of Surgery Review of History & Physical: H&P Update referred to the surgeon/provider.    Ready For Surgery From Anesthesia Perspective.     .  I explained anesthesia plan to patient/responsbile party if available.  Anesthesia consent done going over the material facts, risks, complications & alternatives, obtained which includes the possibility of altering the anesthesia plan.  I reviewed problem list, prior to admission medication list, appropriate labs, any workup, Xray, EKG etc noted below.  Patients condition is satisfactory to proceed with anesthesia plan unless otherwise noted (see anesthesia chart for details of the anesthesia plan carried out).      Pre-operative evaluation for Procedure(s) (LRB):  MRI  "(Magnetic Resonance Imagine) (N/A)    BP (!) 145/77   Pulse 79   Temp 36.8 °C (98.3 °F) (Oral)   Resp 18   Ht 5' 4" (1.626 m)   Wt 90.1 kg (198 lb 10.2 oz)   SpO2 95%   Breastfeeding No   BMI 34.10 kg/m²     Patient Active Problem List   Diagnosis    Hypertension    Spinal stenosis of lumbar region    Type 2 diabetes mellitus    Hyperlipidemia       Review of patient's allergies indicates:   Allergen Reactions    Iodine Swelling       Current Outpatient Medications   Medication Instructions    albuterol (PROAIR HFA) 90 mcg/actuation inhaler 2 puffs, Inhalation, Every 6 hours PRN, Rescue    albuterol (PROVENTIL HFA) 90 mcg/actuation inhaler 2 puffs, Inhalation, Every 6 hours PRN, Rescue    ALPRAZolam (XANAX) 0.25 mg, Oral, Once, To be taken during MRI for anxiety    amLODIPine (NORVASC) 10 mg, Oral, Daily    atorvastatin (LIPITOR) 40 mg, Oral, Daily    cephALEXin (KEFLEX) 500 mg, Oral, 2 times daily    DULoxetine (CYMBALTA) 20 mg, Oral, Daily    fluticasone propionate (FLONASE) 50 mcg, Each Nostril, Daily    gabapentin (NEURONTIN) 300 mg, Oral, 3 times daily    HYDROcodone-acetaminophen (NORCO) 7.5-325 mg per tablet 1 tablet, Oral, Every 6 hours PRN    ibuprofen (ADVIL,MOTRIN) 600 MG tablet SMARTSI Tablet(s) By Mouth Every 12 Hours PRN    latanoprost 0.005 % ophthalmic solution 1 drop, Both Eyes, Daily    lisinopriL (PRINIVIL,ZESTRIL) 20 mg, Oral, Daily    metFORMIN (GLUCOPHAGE) 500 mg, Oral, 2 times daily with meals    metoprolol succinate (TOPROL-XL) 50 mg, Oral, Daily    triamcinolone acetonide 0.1% (KENALOG) 0.1 % cream Topical (Top), 2 times daily    triamcinolone acetonide 0.1% (KENALOG) 1 g, Topical (Top), 2 times daily    triamterene-hydrochlorothiazide 75-50 mg (MAXZIDE) 75-50 mg per tablet 1 tablet, Oral, Daily       Past Surgical History:   Procedure Laterality Date    HYSTERECTOMY         Social History     Socioeconomic History    Marital status:    Occupational History    Occupation: " "retired   Tobacco Use    Smoking status: Former    Smokeless tobacco: Former   Substance and Sexual Activity    Alcohol use: Not Currently    Drug use: Not Currently    Sexual activity: Yes       Lab Results   Component Value Date    WBC 9.40 03/13/2024    HGB 13.3 03/13/2024    HCT 42.0 03/13/2024    MCV 85.4 03/13/2024     03/13/2024          BMP  Lab Results   Component Value Date    HCT 42.0 03/13/2024     03/13/2024    K 4.1 03/13/2024    BUN 14.3 03/13/2024    CREATININE 0.85 03/13/2024    CALCIUM 9.9 03/13/2024        INR  No results for input(s): "PT", "INR", "PROTIME", "APTT" in the last 72 hours.        Diagnostic Studies:      EKG:  Results for orders placed or performed during the hospital encounter of 01/16/23   EKG 12-lead    Collection Time: 01/16/23  6:45 PM    Narrative    Test Reason : R06.02,    Vent. Rate : 075 BPM     Atrial Rate : 075 BPM     P-R Int : 148 ms          QRS Dur : 084 ms      QT Int : 396 ms       P-R-T Axes : 062 045 060 degrees     QTc Int : 442 ms    Normal sinus rhythm  Normal ECG  When compared with ECG of 04-JUL-2022 14:35,  No significant change was found  Confirmed by Elliot Sher MD ([id number]) on 1/17/2023 11:56:41 AM    Referred By: [email or web address]   SELF           Confirmed By:Elliot Sher MD            "

## 2024-03-15 NOTE — TRANSFER OF CARE
"Anesthesia Transfer of Care Note    Patient: Charlotte Choi    Procedure(s) Performed: Procedure(s) (LRB):  MRI (Magnetic Resonance Imagine) (N/A)    Patient location: PACU    Anesthesia Type: general    Transport from OR: Transported from OR on room air with adequate spontaneous ventilation    Post pain: adequate analgesia    Post assessment: no apparent anesthetic complications    Post vital signs: stable    Level of consciousness: responds to stimulation    Nausea/Vomiting: no nausea/vomiting    Complications: none    Transfer of care protocol was followed      Last vitals: Visit Vitals  BP (!) 145/77   Pulse 79   Temp 36.8 °C (98.3 °F) (Oral)   Resp 18   Ht 5' 4" (1.626 m)   Wt 90.1 kg (198 lb 10.2 oz)   SpO2 95%   Breastfeeding No   BMI 34.10 kg/m²     "

## 2024-03-15 NOTE — DISCHARGE INSTRUCTIONS
BLEEDING: if you experience uncontrollable bleeding, contact your doctor or go to ER    NAUSEA: due to the anesthesia, you may experience nausea for up to 24 hours. If nausea and vomiting last longer, contact your doctor.     INFECTION:  watch for any signs or symptoms of infection such as chills, fever, redness or drainage at surgical site. Notify your doctor     PAIN : take your pain medications as directed. If the pain medications are not helping, notify your doctor.

## 2024-03-15 NOTE — ANESTHESIA PROCEDURE NOTES
Intubation    Date/Time: 3/15/2024 10:40 AM    Performed by: Cynthia Roche CRNA  Authorized by: Romaine Baxter MD    Intubation:     Induction:  Intravenous    Intubated:  Postinduction    Mask Ventilation:  Not attempted    Attempts:  1    Attempted By:  Student    Difficult Airway Encountered?: No      Complications:  None    Airway Device Size:  4.0    Placement Verified By:  Capnometry    Complicating Factors:  None    Findings Post-Intubation:  BS equal bilateral and atraumatic/condition of teeth unchanged  Notes:      LMA 4

## 2024-03-18 LAB — POCT GLUCOSE: 134 MG/DL (ref 70–110)

## 2024-03-20 VITALS
WEIGHT: 198.63 LBS | OXYGEN SATURATION: 96 % | SYSTOLIC BLOOD PRESSURE: 133 MMHG | RESPIRATION RATE: 16 BRPM | BODY MASS INDEX: 33.91 KG/M2 | HEIGHT: 64 IN | TEMPERATURE: 98 F | DIASTOLIC BLOOD PRESSURE: 72 MMHG | HEART RATE: 77 BPM

## 2024-05-01 ENCOUNTER — HOSPITAL ENCOUNTER (INPATIENT)
Facility: HOSPITAL | Age: 79
LOS: 2 days | Discharge: HOME HEALTH | DRG: 871 | End: 2024-05-04
Attending: EMERGENCY MEDICINE | Admitting: STUDENT IN AN ORGANIZED HEALTH CARE EDUCATION/TRAINING PROGRAM
Payer: MEDICARE

## 2024-05-01 DIAGNOSIS — N17.9 AKI (ACUTE KIDNEY INJURY): ICD-10-CM

## 2024-05-01 DIAGNOSIS — J18.9 COMMUNITY ACQUIRED PNEUMONIA OF RIGHT LUNG, UNSPECIFIED PART OF LUNG: Primary | ICD-10-CM
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
## 2024-05-01 DIAGNOSIS — A41.9 SEPSIS: ICD-10-CM

## 2024-05-01 LAB
ALBUMIN SERPL-MCNC: 3.7 G/DL (ref 3.4–4.8)
ALBUMIN/GLOB SERPL: 0.6 RATIO (ref 1.1–2)
ALP SERPL-CCNC: 60 UNIT/L (ref 40–150)
ALT SERPL-CCNC: 20 UNIT/L (ref 0–55)
APPEARANCE UR: CLEAR
AST SERPL-CCNC: 25 UNIT/L (ref 5–34)
BACTERIA #/AREA URNS AUTO: ABNORMAL /HPF
BASOPHILS # BLD AUTO: 0.04 X10(3)/MCL
BASOPHILS NFR BLD AUTO: 0.2 %
BILIRUB SERPL-MCNC: 0.8 MG/DL
BILIRUB UR QL STRIP.AUTO: NEGATIVE
BNP BLD-MCNC: 30.1 PG/ML
BUN SERPL-MCNC: 15.9 MG/DL (ref 9.8–20.1)
CALCIUM SERPL-MCNC: 10.4 MG/DL (ref 8.4–10.2)
CHLORIDE SERPL-SCNC: 98 MMOL/L (ref 98–107)
CO2 SERPL-SCNC: 23 MMOL/L (ref 23–31)
COLOR UR AUTO: YELLOW
CREAT SERPL-MCNC: 1.32 MG/DL (ref 0.55–1.02)
EOSINOPHIL # BLD AUTO: 0 X10(3)/MCL (ref 0–0.9)
EOSINOPHIL NFR BLD AUTO: 0 %
ERYTHROCYTE [DISTWIDTH] IN BLOOD BY AUTOMATED COUNT: 13.1 % (ref 11.5–17)
FLUAV AG UPPER RESP QL IA.RAPID: NOT DETECTED
FLUBV AG UPPER RESP QL IA.RAPID: NOT DETECTED
GFR SERPLBLD CREATININE-BSD FMLA CKD-EPI: 41 MLS/MIN/1.73/M2
GLOBULIN SER-MCNC: 5.7 GM/DL (ref 2.4–3.5)
GLUCOSE SERPL-MCNC: 156 MG/DL (ref 82–115)
GLUCOSE UR QL STRIP.AUTO: NORMAL
HCT VFR BLD AUTO: 44 % (ref 37–47)
HGB BLD-MCNC: 14.5 G/DL (ref 12–16)
HOLD SPECIMEN: NORMAL
HYALINE CASTS #/AREA URNS LPF: ABNORMAL /LPF
IMM GRANULOCYTES # BLD AUTO: 0.17 X10(3)/MCL (ref 0–0.04)
IMM GRANULOCYTES NFR BLD AUTO: 0.9 %
KETONES UR QL STRIP.AUTO: ABNORMAL
LACTATE SERPL-SCNC: 1.6 MMOL/L (ref 0.5–2.2)
LACTATE SERPL-SCNC: 1.7 MMOL/L (ref 0.5–2.2)
LEUKOCYTE ESTERASE UR QL STRIP.AUTO: NEGATIVE
LYMPHOCYTES # BLD AUTO: 2.1 X10(3)/MCL (ref 0.6–4.6)
LYMPHOCYTES NFR BLD AUTO: 10.9 %
MCH RBC QN AUTO: 27.3 PG (ref 27–31)
MCHC RBC AUTO-ENTMCNC: 33 G/DL (ref 33–36)
MCV RBC AUTO: 82.9 FL (ref 80–94)
MONOCYTES # BLD AUTO: 1.44 X10(3)/MCL (ref 0.1–1.3)
MONOCYTES NFR BLD AUTO: 7.5 %
MUCOUS THREADS URNS QL MICRO: ABNORMAL /LPF
NEUTROPHILS # BLD AUTO: 15.46 X10(3)/MCL (ref 2.1–9.2)
NEUTROPHILS NFR BLD AUTO: 80.5 %
NITRITE UR QL STRIP.AUTO: NEGATIVE
NRBC BLD AUTO-RTO: 0 %
OHS QRS DURATION: 88 MS
OHS QTC CALCULATION: 425 MS
PH UR STRIP.AUTO: 6 [PH]
PLATELET # BLD AUTO: 266 X10(3)/MCL (ref 130–400)
PMV BLD AUTO: 11.6 FL (ref 7.4–10.4)
POCT GLUCOSE: 136 MG/DL (ref 70–110)
POCT GLUCOSE: 197 MG/DL (ref 70–110)
POCT GLUCOSE: 213 MG/DL (ref 70–110)
POTASSIUM SERPL-SCNC: 3.8 MMOL/L (ref 3.5–5.1)
PROT SERPL-MCNC: 9.4 GM/DL (ref 5.8–7.6)
PROT UR QL STRIP.AUTO: ABNORMAL
RBC # BLD AUTO: 5.31 X10(6)/MCL (ref 4.2–5.4)
RBC #/AREA URNS AUTO: ABNORMAL /HPF
RBC UR QL AUTO: ABNORMAL
RSV A 5' UTR RNA NPH QL NAA+PROBE: NOT DETECTED
SARS-COV-2 RNA RESP QL NAA+PROBE: NOT DETECTED
SODIUM SERPL-SCNC: 133 MMOL/L (ref 136–145)
SP GR UR STRIP.AUTO: 1.02 (ref 1–1.03)
SQUAMOUS #/AREA URNS LPF: ABNORMAL /HPF
TROPONIN I SERPL-MCNC: 0.01 NG/ML (ref 0–0.04)
UROBILINOGEN UR STRIP-ACNC: NORMAL
WBC # SPEC AUTO: 19.21 X10(3)/MCL (ref 4.5–11.5)
WBC #/AREA URNS AUTO: ABNORMAL /HPF

## 2024-05-01 PROCEDURE — 96375 TX/PRO/DX INJ NEW DRUG ADDON: CPT

## 2024-05-01 PROCEDURE — G0378 HOSPITAL OBSERVATION PER HR: HCPCS

## 2024-05-01 PROCEDURE — 83880 ASSAY OF NATRIURETIC PEPTIDE: CPT | Performed by: EMERGENCY MEDICINE

## 2024-05-01 PROCEDURE — 96374 THER/PROPH/DIAG INJ IV PUSH: CPT | Mod: 59

## 2024-05-01 PROCEDURE — 25000003 PHARM REV CODE 250

## 2024-05-01 PROCEDURE — 63600175 PHARM REV CODE 636 W HCPCS: Performed by: STUDENT IN AN ORGANIZED HEALTH CARE EDUCATION/TRAINING PROGRAM

## 2024-05-01 PROCEDURE — 84484 ASSAY OF TROPONIN QUANT: CPT | Performed by: EMERGENCY MEDICINE

## 2024-05-01 PROCEDURE — 83605 ASSAY OF LACTIC ACID: CPT | Performed by: EMERGENCY MEDICINE

## 2024-05-01 PROCEDURE — 99285 EMERGENCY DEPT VISIT HI MDM: CPT | Mod: 25

## 2024-05-01 PROCEDURE — 25000003 PHARM REV CODE 250: Performed by: STUDENT IN AN ORGANIZED HEALTH CARE EDUCATION/TRAINING PROGRAM

## 2024-05-01 PROCEDURE — 0241U COVID/RSV/FLU A&B PCR: CPT | Performed by: EMERGENCY MEDICINE

## 2024-05-01 PROCEDURE — 96365 THER/PROPH/DIAG IV INF INIT: CPT | Mod: 59

## 2024-05-01 PROCEDURE — 63600175 PHARM REV CODE 636 W HCPCS: Performed by: EMERGENCY MEDICINE

## 2024-05-01 PROCEDURE — 82962 GLUCOSE BLOOD TEST: CPT

## 2024-05-01 PROCEDURE — 87899 AGENT NOS ASSAY W/OPTIC: CPT

## 2024-05-01 PROCEDURE — 96361 HYDRATE IV INFUSION ADD-ON: CPT

## 2024-05-01 PROCEDURE — 96372 THER/PROPH/DIAG INJ SC/IM: CPT | Performed by: STUDENT IN AN ORGANIZED HEALTH CARE EDUCATION/TRAINING PROGRAM

## 2024-05-01 PROCEDURE — 93005 ELECTROCARDIOGRAM TRACING: CPT

## 2024-05-01 PROCEDURE — 87798 DETECT AGENT NOS DNA AMP: CPT

## 2024-05-01 PROCEDURE — 80053 COMPREHEN METABOLIC PANEL: CPT | Performed by: EMERGENCY MEDICINE

## 2024-05-01 PROCEDURE — 85025 COMPLETE CBC W/AUTO DIFF WBC: CPT | Performed by: EMERGENCY MEDICINE

## 2024-05-01 PROCEDURE — 96365 THER/PROPH/DIAG IV INF INIT: CPT

## 2024-05-01 PROCEDURE — 25000003 PHARM REV CODE 250: Performed by: EMERGENCY MEDICINE

## 2024-05-01 PROCEDURE — 81001 URINALYSIS AUTO W/SCOPE: CPT | Performed by: EMERGENCY MEDICINE

## 2024-05-01 PROCEDURE — 87040 BLOOD CULTURE FOR BACTERIA: CPT | Performed by: EMERGENCY MEDICINE

## 2024-05-01 RX ORDER — GLUCAGON 1 MG
1 KIT INJECTION
Status: DISCONTINUED | OUTPATIENT
Start: 2024-05-01 | End: 2024-05-04 | Stop reason: HOSPADM

## 2024-05-01 RX ORDER — INSULIN ASPART 100 [IU]/ML
0-10 INJECTION, SOLUTION INTRAVENOUS; SUBCUTANEOUS
Status: DISCONTINUED | OUTPATIENT
Start: 2024-05-01 | End: 2024-05-04 | Stop reason: HOSPADM

## 2024-05-01 RX ORDER — ATORVASTATIN CALCIUM 40 MG/1
40 TABLET, FILM COATED ORAL DAILY
Status: DISCONTINUED | OUTPATIENT
Start: 2024-05-01 | End: 2024-05-04 | Stop reason: HOSPADM

## 2024-05-01 RX ORDER — HEPARIN SODIUM 5000 [USP'U]/ML
5000 INJECTION, SOLUTION INTRAVENOUS; SUBCUTANEOUS EVERY 12 HOURS
Status: DISCONTINUED | OUTPATIENT
Start: 2024-05-01 | End: 2024-05-04 | Stop reason: HOSPADM

## 2024-05-01 RX ORDER — METOPROLOL SUCCINATE 50 MG/1
50 TABLET, EXTENDED RELEASE ORAL DAILY
Status: DISCONTINUED | OUTPATIENT
Start: 2024-05-01 | End: 2024-05-03

## 2024-05-01 RX ORDER — DULOXETIN HYDROCHLORIDE 20 MG/1
20 CAPSULE, DELAYED RELEASE ORAL DAILY
Status: DISCONTINUED | OUTPATIENT
Start: 2024-05-01 | End: 2024-05-04 | Stop reason: HOSPADM

## 2024-05-01 RX ORDER — ALBUTEROL SULFATE 90 UG/1
2 AEROSOL, METERED RESPIRATORY (INHALATION) EVERY 6 HOURS PRN
Status: DISCONTINUED | OUTPATIENT
Start: 2024-05-01 | End: 2024-05-04 | Stop reason: HOSPADM

## 2024-05-01 RX ORDER — ONDANSETRON HYDROCHLORIDE 2 MG/ML
4 INJECTION, SOLUTION INTRAVENOUS EVERY 8 HOURS PRN
Status: DISCONTINUED | OUTPATIENT
Start: 2024-05-01 | End: 2024-05-04 | Stop reason: HOSPADM

## 2024-05-01 RX ORDER — AMLODIPINE BESYLATE 10 MG/1
10 TABLET ORAL DAILY
Status: DISCONTINUED | OUTPATIENT
Start: 2024-05-01 | End: 2024-05-04 | Stop reason: HOSPADM

## 2024-05-01 RX ORDER — GABAPENTIN 300 MG/1
300 CAPSULE ORAL 3 TIMES DAILY
Status: DISCONTINUED | OUTPATIENT
Start: 2024-05-01 | End: 2024-05-04 | Stop reason: HOSPADM

## 2024-05-01 RX ORDER — IBUPROFEN 200 MG
16 TABLET ORAL
Status: DISCONTINUED | OUTPATIENT
Start: 2024-05-01 | End: 2024-05-04 | Stop reason: HOSPADM

## 2024-05-01 RX ORDER — ACETAMINOPHEN 500 MG
1000 TABLET ORAL
Status: COMPLETED | OUTPATIENT
Start: 2024-05-01 | End: 2024-05-01

## 2024-05-01 RX ORDER — ACETAMINOPHEN 325 MG/1
650 TABLET ORAL EVERY 6 HOURS PRN
Status: DISCONTINUED | OUTPATIENT
Start: 2024-05-01 | End: 2024-05-04 | Stop reason: HOSPADM

## 2024-05-01 RX ORDER — FUROSEMIDE 10 MG/ML
40 INJECTION INTRAMUSCULAR; INTRAVENOUS
Status: COMPLETED | OUTPATIENT
Start: 2024-05-01 | End: 2024-05-01

## 2024-05-01 RX ORDER — TALC
6 POWDER (GRAM) TOPICAL NIGHTLY PRN
Status: DISCONTINUED | OUTPATIENT
Start: 2024-05-01 | End: 2024-05-04 | Stop reason: HOSPADM

## 2024-05-01 RX ORDER — IBUPROFEN 200 MG
24 TABLET ORAL
Status: DISCONTINUED | OUTPATIENT
Start: 2024-05-01 | End: 2024-05-04 | Stop reason: HOSPADM

## 2024-05-01 RX ORDER — ACETAMINOPHEN 325 MG/1
650 TABLET ORAL EVERY 6 HOURS PRN
Status: DISCONTINUED | OUTPATIENT
Start: 2024-05-01 | End: 2024-05-01

## 2024-05-01 RX ORDER — SODIUM CHLORIDE 0.9 % (FLUSH) 0.9 %
10 SYRINGE (ML) INJECTION
Status: DISCONTINUED | OUTPATIENT
Start: 2024-05-01 | End: 2024-05-04 | Stop reason: HOSPADM

## 2024-05-01 RX ADMIN — FUROSEMIDE 40 MG: 10 INJECTION, SOLUTION INTRAMUSCULAR; INTRAVENOUS at 12:05

## 2024-05-01 RX ADMIN — DOXYCYCLINE 100 MG: 100 INJECTION, POWDER, LYOPHILIZED, FOR SOLUTION INTRAVENOUS at 12:05

## 2024-05-01 RX ADMIN — ACETAMINOPHEN 650 MG: 325 TABLET, FILM COATED ORAL at 04:05

## 2024-05-01 RX ADMIN — METOPROLOL SUCCINATE 50 MG: 50 TABLET, FILM COATED, EXTENDED RELEASE ORAL at 04:05

## 2024-05-01 RX ADMIN — AMLODIPINE BESYLATE 10 MG: 10 TABLET ORAL at 04:05

## 2024-05-01 RX ADMIN — CEFTRIAXONE SODIUM 2 G: 2 INJECTION, POWDER, FOR SOLUTION INTRAMUSCULAR; INTRAVENOUS at 09:05

## 2024-05-01 RX ADMIN — HEPARIN SODIUM 5000 UNITS: 5000 INJECTION, SOLUTION INTRAVENOUS; SUBCUTANEOUS at 08:05

## 2024-05-01 RX ADMIN — INSULIN ASPART 1 UNITS: 100 INJECTION, SOLUTION INTRAVENOUS; SUBCUTANEOUS at 08:05

## 2024-05-01 RX ADMIN — ONDANSETRON 4 MG: 2 INJECTION INTRAMUSCULAR; INTRAVENOUS at 03:05

## 2024-05-01 RX ADMIN — INSULIN ASPART 4 UNITS: 100 INJECTION, SOLUTION INTRAVENOUS; SUBCUTANEOUS at 05:05

## 2024-05-01 RX ADMIN — AZITHROMYCIN MONOHYDRATE 500 MG: 500 INJECTION, POWDER, LYOPHILIZED, FOR SOLUTION INTRAVENOUS at 04:05

## 2024-05-01 RX ADMIN — ATORVASTATIN CALCIUM 40 MG: 40 TABLET, FILM COATED ORAL at 04:05

## 2024-05-01 RX ADMIN — GABAPENTIN 300 MG: 300 CAPSULE ORAL at 04:05

## 2024-05-01 RX ADMIN — GABAPENTIN 300 MG: 300 CAPSULE ORAL at 08:05

## 2024-05-01 RX ADMIN — SODIUM CHLORIDE 2640 ML: 9 INJECTION, SOLUTION INTRAVENOUS at 09:05

## 2024-05-01 RX ADMIN — ACETAMINOPHEN 1000 MG: 500 TABLET ORAL at 09:05

## 2024-05-01 NOTE — H&P
[facility] Medicine Wards   History & Physical Note     Resident Team: [facility] Medicine List 1  Attending Physician: Paulo Goodson MD  Resident: Chandler Mendieta DO   Intern: Chapin Webb DO    Date of Admit: 5/1/2024    Chief Complaint:     Fall (C/o generalized weakness, dizziness with 2 ground level falls yesterday. Did not hit head, no LOC. Also reports frequent urination. Currently febrile with some confusion. Oriented to person and place only.)   for 4 days    Subjective:      History of Present Illness:  Charlotte Choi is a 78 y.o. female with a history of hypertension, diabetes, hyperlipidemia who presented to [facility] ED on 5/1/2024  with complaint of Falls and weakness ongoing since Sunday.  Patient states that on Sunday she began to feel weakness with malaise, did not leave her bed.  Patient has experienced poor p.o. intake during this time.  She states that since then she is lost track of time and remained in bed.  Yesterday patient attempted to go to the bathroom and experienced a fall, after which she remained on the floor until today.  She experienced another fall again today.  Patient denies any lightheadedness prior to these falls and denies any loss of consciousness.  She denies any head trauma.  She endorses watery diarrhea approximately twice a day without blood, shortness and breath at rest, dry cough, dysuria, fever and chills that began yesterday.  She denies any nausea, vomiting, headache, chest pain, seizures or postictal state.  Patient's daughter at bedside endorses that the mother did lose bladder control today.    ED:  Patient febrile with a T-max of 101.7°, tachypneic 28, hypertensive 163/89, normoxic on RA.  CBC reveals a leukocytosis of 19.2 with a left shift.  CMP notable for hyponatremia 133, elevated serum creatinine at 1.32 (baseline 0.8), hyperglycemia 156, hypercalcemia 10.4.  BNP and troponin both negative.  Lactic acid within normal limits.  Flu, COVID, RSV negative.  UA unremarkable.   Chest x-ray shows changes of pulmonary vascular congestion and cardiac compensation, confluent opacities in the right infrahilar region superimposed infiltrate can not be completely excluded.  Patient was given 1 L normal saline and 1 g ceftriaxone in ED. she was admitted to internal medicine for management of sepsis due to community-acquired pneumonia.    Past Medical History:  Past Medical History:   Diagnosis Date    Diabetes mellitus     Hypertension         Past Surgical History:  Past Surgical History:   Procedure Laterality Date    HYSTERECTOMY      MAGNETIC RESONANCE IMAGING N/A 3/15/2024    Procedure: MRI (Magnetic Resonance Imagine);  Surgeon: Susanne Ellington MD;  Location: [facility];  Service: Anesthesiology;  Laterality: N/A;  MRI BRAIN W W/O CONTRAST WITH ANESTHESIA        Family History:  Family History   Problem Relation Name Age of Onset    Diabetes Mother      Hypertension Mother      Intellectual disability Mother      Cancer Father      Mental illness Sister      Hypertension Sister      Diabetes Brother      Stroke Brother          Social History:  Social History     Socioeconomic History    Marital status:    Occupational History    Occupation: retired   Tobacco Use    Smoking status: Former    Smokeless tobacco: Former   Substance and Sexual Activity    Alcohol use: Not Currently    Drug use: Not Currently    Sexual activity: Yes        Allergies:  is allergic to iodine.     Home Medications:  Prior to Admission medications    Medication Sig Start Date End Date Taking? Authorizing Provider   albuterol (PROAIR HFA) 90 mcg/actuation inhaler Inhale 2 puffs into the lungs every 6 (six) hours as needed for Wheezing. Rescue 9/20/23   Pawan Oneil, DO   albuterol (PROVENTIL HFA) 90 mcg/actuation inhaler Inhale 2 puffs into the lungs every 6 (six) hours as needed for Shortness of Breath. Rescue 5/18/23   Pawan Oneil, DO   ALPRAZolam (XANAX) 0.25 MG tablet Take 1 tablet (0.25 mg total) by  mouth once. To be taken during MRI for anxiety for 1 dose 23  Pawan Oneil DO   amLODIPine (NORVASC) 10 MG tablet Take 1 tablet (10 mg total) by mouth once daily. 1/10/24 1/9/25  Pawan Oneil DO   atorvastatin (LIPITOR) 40 MG tablet Take 1 tablet (40 mg total) by mouth once daily. 23  Pawan Oneil DO   DULoxetine (CYMBALTA) 20 MG capsule Take 1 capsule (20 mg total) by mouth once daily. 23  Pawan Oneil DO   fluticasone propionate (FLONASE) 50 mcg/actuation nasal spray 1 spray (50 mcg total) by Each Nostril route once daily. 23   Pawan Oneil DO   gabapentin (NEURONTIN) 300 MG capsule Take 1 capsule (300 mg total) by mouth 3 (three) times daily. 23  Pawan Oneil DO   HYDROcodone-acetaminophen (NORCO) 7.5-325 mg per tablet Take 1 tablet by mouth every 6 (six) hours as needed for Pain.  Patient not taking: Reported on 3/6/2024 1/10/24   Pawan Oneil DO   ibuprofen (ADVIL,MOTRIN) 600 MG tablet SMARTSI Tablet(s) By Mouth Every 12 Hours PRN 3/25/22   Provider, Historical   latanoprost 0.005 % ophthalmic solution Place 1 drop into both eyes once daily. 23   Provider, Historical   lisinopriL (PRINIVIL,ZESTRIL) 20 MG tablet Take 1 tablet (20 mg total) by mouth once daily. 1/10/24 1/9/25  Pawan Oneil DO   metFORMIN (GLUCOPHAGE) 500 MG tablet Take 1 tablet (500 mg total) by mouth 2 (two) times daily with meals. 23  Pawan Oneil DO   metoprolol succinate (TOPROL-XL) 50 MG 24 hr tablet Take 1 tablet (50 mg total) by mouth once daily. 23  Pawan Oneil DO   triamcinolone acetonide 0.1% (KENALOG) 0.1 % cream Apply 1 g topically 2 (two) times daily.    Provider, Historical   triamcinolone acetonide 0.1% (KENALOG) 0.1 % cream Apply topically 2 (two) times daily. 24   Pawan Oneil,    triamterene-hydrochlorothiazide 75-50 mg (MAXZIDE) 75-50 mg per tablet Take 1 tablet by mouth once daily. 1/10/24    Pawan Oneil DO       Review of Systems:  Review of Systems   Constitutional:  Positive for chills, fever and malaise/fatigue.   HENT: Negative.  Negative for sinus pain and sore throat.    Eyes: Negative.    Respiratory:  Positive for cough and shortness of breath. Negative for sputum production and wheezing.    Cardiovascular:  Negative for chest pain, palpitations, orthopnea, claudication, leg swelling and PND.   Gastrointestinal:  Positive for diarrhea. Negative for abdominal pain, blood in stool, constipation, melena, nausea and vomiting.   Genitourinary:  Negative for dysuria and hematuria.   Musculoskeletal:  Positive for myalgias. Negative for joint pain.   Skin: Negative.    Neurological:  Positive for weakness. Negative for seizures, loss of consciousness and headaches.   Psychiatric/Behavioral: Negative.            Objective:     Vital Signs (Most Recent):  Temp: (!) 101.2 °F (38.4 °C) (05/01/24 1400)  Pulse: 94 (05/01/24 1400)  Resp: (!) 27 (05/01/24 1400)  BP: (!) 156/77 (05/01/24 1400)  SpO2: 98 % (05/01/24 1400) Vital Signs (24h Range):  Temp:  [100.9 °F (38.3 °C)-101.7 °F (38.7 °C)] 101.2 °F (38.4 °C)  Pulse:  [] 94  Resp:  [19-28] 27  SpO2:  [94 %-100 %] 98 %  BP: (137-179)/() 156/77       Physical Examination:  GEN: A&Ox4. No acute distress, febrile with chills  HEENT: normocephalic, atraumatic. PERRLA. EMOI bilaterally. Sclera, conjunctiva clear. Nares patents. Oropharyngeal mucosa moist, non-erythematous, non-edematous, uvula midline. Neck supple without LAD   CARDIAC: S1 S2, no MRG. Radial pulses full, no LE edema   RESPI: Chest wall rise symmetric, atraumatic. Lungs CTAB, no wheezing or rhonchi, adventitious lung sounds heard throughout   ABDOMEN: soft, nontender, BS present in all 4 quadrants. No herniation, masses, HSM  : deferred   MSK: ROM grossly intact.   NEURO: Motor and sensation grossly intact. CN grossly intact. No cerebellar deficits noted. No gait abnormalities  noted.   PSYCH: Memory impaired, patient confused. Appropriate mood and affect. No AI/HI. No HI/SI         CBC  Recent Labs   Lab 05/01/24 0944   WBC 19.21*   RBC 5.31   HGB 14.5   HCT 44.0   MCV 82.9   MCH 27.3   MCHC 33.0   RDW 13.1      MPV 11.6*       CMP   Recent Labs   Lab 05/01/24 0944   *   K 3.8   CHLORIDE 98   CO2 23   BUN 15.9   CREATININE 1.32*   CALCIUM 10.4*   ALBUMIN 3.7   ALKPHOS 60   ALT 20   AST 25   BILITOT 0.8          Microbiology Data:  Microbiology Results (last 7 days)       Procedure Component Value Units Date/Time    Respiratory Culture [[id number]]     Order Status: Sent Specimen: Sputum     Blood culture x two cultures. Draw prior to antibiotics. [[id number]] Collected: 05/01/24 0944    Order Status: Resulted Specimen: Blood Updated: 05/01/24 0958    Blood culture x two cultures. Draw prior to antibiotics. [[id number]] Collected: 05/01/24 0943    Order Status: Resulted Specimen: Blood from Antecubital, Left Updated: 05/01/24 0958            Cardiac Data:  No echocardiogram results found for the past 14 days.    Results for orders placed or performed during the hospital encounter of 05/01/24   EKG 12-lead    Collection Time: 05/01/24  9:41 AM   Result Value Ref Range    QRS Duration 88 ms    [facility] QTC Calculation 425 ms    Narrative    Test Reason : A41.9,    Vent. Rate : 106 BPM     Atrial Rate : 106 BPM     P-R Int : 138 ms          QRS Dur : 088 ms      QT Int : 320 ms       P-R-T Axes : 054 037 044 degrees     QTc Int : 425 ms    Sinus tachycardia  Possible Left atrial enlargement  Borderline Abnormal ECG  When compared with ECG of 16-JAN-2023 18:45,  No significant change was found    Referred By:             Confirmed By:         Radiology:  Imaging Results              X-Ray Chest AP Portable (Final result)  Result time 05/01/24 10:20:46      Final result by Trever Gonzalez MD (05/01/24 10:20:46)                   Impression:      Changes of pulmonary vascular  congestion and cardiac compensation.    Confluent opacities in the right infrahilar region superimposed infiltrate can not be completely excluded      Electronically signed by: Trever Gonzalez  Date:    05/01/2024  Time:    10:20               Narrative:    EXAMINATION:  XR CHEST AP PORTABLE    CLINICAL HISTORY:  Sepsis;    TECHNIQUE:  Single frontal view of the chest was performed.    COMPARISON:  None    FINDINGS:  Mediastinal silhouette is within normal limits cardiac silhouette is not enlarged there is increase in interstitial and pulmonary vascular markings indicating some degree of pulmonary vascular congestion and cardiac decompensation.    More confluent opacities identified in the right infrahilar region superimposed infiltrate can not be completely excluded.    No other focal consolidative changes                        Wet Read by Valeriano Ellington DO (05/01/24 10:18:41, [facility], Emergency Medicine)    Increased interstitial markings.                                     X-Ray Knee 3 View Right (Final result)  Result time 05/01/24 11:01:44      Final result by Trever Gonzalez MD (05/01/24 11:01:44)                   Impression:      Tricompartmental degenerative changes.      Electronically signed by: Trever Gonzalez  Date:    05/01/2024  Time:    11:01               Narrative:    EXAMINATION:  XR KNEE 3 VIEW RIGHT    CLINICAL HISTORY:  Unspecified fall, initial encounter    COMPARISON:  None.    FINDINGS:  No acute displaced fractures or dislocations.    There is narrowing of the medial compartment of the knee joint with presence of marginal osteophytes marginal osteophytes seen of the lateral compartment with some degenerative changes of the patellofemoral joint articular spaces otherwise preserved with smooth articular surfaces    No blastic or lytic lesions.    Soft tissues within normal limits.                                         Lines/Drains/Airways        Peripheral Intravenous Line  Duration                  Peripheral IV - Single Lumen 05/01/24 0926 20 G Right Antecubital <1 day         Peripheral IV - Single Lumen 05/01/24 0934 22 G Posterior;Right Hand <1 day                     Assessment & Plan:     Sepsis 2/2 CAP   Poor PO Intake   Weakness and Falls 2/2 Above   - SIRS 3/4 - WBC, Temp, Tachypnea   - 1L NS, 1g Rocephin given in ED   - CXR above   - Azithro and Rocephin   - Proair q6 PRN, APAP for fever   - DuoNebs, incentive spirometry  - F/u RCX, Respi panel, legionella urine Ag, BCX   - CTM labs     IVORY   - Cr 1.32 (BL 0.8)   - Holding home ACE   - Avoid nephrotoxins  - CTM and rehydrate appropriately     T2DM   - SSI   - Holding Metformin   - CTM with POC    HTN   - Toprolol 50 and Amlodipine 10   - Holding lisinopril         CODE STATUS:  Full  Access:  PIV x2  Antibiotics:  Azithro and Rocephin day 1   Diet:  Diabetic   DVT Prophylaxis:  Hep 5000  GI Prophylaxis:  None  Fluids:  None     Dispo:  78-year-old female admitted to Internal Medicine for sepsis due to pneumonia.    Chapin Webb DO   Internal Medicine - PGY-1

## 2024-05-01 NOTE — ED PROVIDER NOTES
ED PROVIDER NOTE  5/1/2024    CHIEF COMPLAINT:   Chief Complaint   Patient presents with    Fall     C/o generalized weakness, dizziness with 2 ground level falls yesterday. Did not hit head, no LOC. Also reports frequent urination. Currently febrile with some confusion. Oriented to person and place only.       HISTORY OF PRESENT ILLNESS:   Charlotte Choi is a 78 y.o. female who presents with chief complaint Generalized weakness.  Onset was yesterday whenever she began having generalized weakness along with lightheadedness with standing, stating that she was fallen twice landing on her buttocks, denies striking her head or having any loss of consciousness.  She reports having some pain with urination but can not recall exactly how long this has been going on for.  She does report having some discomfort in her right knee as a result of 1 of her falls.  EMS reports she was tachycardic and febrile upon their arrival.  History is somewhat limited due to patient condition, confusion.    The history is provided by the patient and the EMS personnel. The history is limited by the condition of the patient.         REVIEW OF SYSTEMS: as noted in the HPI.  NURSING NOTES REVIEWED      PAST MEDICAL/SURGICAL HISTORY:   Past Medical History:   Diagnosis Date    Diabetes mellitus     Hypertension       Past Surgical History:   Procedure Laterality Date    HYSTERECTOMY      MAGNETIC RESONANCE IMAGING N/A 3/15/2024    Procedure: MRI (Magnetic Resonance Imagine);  Surgeon: Susanne Ellington MD;  Location: [facility];  Service: Anesthesiology;  Laterality: N/A;  MRI BRAIN W W/O CONTRAST WITH ANESTHESIA       FAMILY HISTORY:   Family History   Problem Relation Name Age of Onset    Diabetes Mother      Hypertension Mother      Intellectual disability Mother      Cancer Father      Mental illness Sister      Hypertension Sister      Diabetes Brother      Stroke Brother         SOCIAL HISTORY:   Social History     Tobacco Use    Smoking  status: Former    Smokeless tobacco: Former   Substance Use Topics    Alcohol use: Not Currently    Drug use: Not Currently       ALLERGIES:   Review of patient's allergies indicates:   Allergen Reactions    Iodine Swelling       PHYSICAL EXAM:  Initial Vitals [05/01/24 0913]   BP Pulse Resp Temp SpO2   (!) 166/114 105 20 (!) 101.7 °F (38.7 °C) 97 %      MAP       --         Physical Exam    Nursing note and vitals reviewed.  Constitutional: She appears well-developed and well-nourished.   HENT:   Head: Normocephalic and atraumatic.   Mouth/Throat: Uvula is midline and mucous membranes are normal.   Eyes: EOM are normal. Pupils are equal, round, and reactive to light.   Neck: Trachea normal. Neck supple.   Cardiovascular:  Regular rhythm, intact distal pulses and normal pulses.   Tachycardia present.         Pulmonary/Chest: Effort normal and breath sounds normal.   Abdominal: Abdomen is soft. Bowel sounds are normal. There is no rebound and no guarding.   Musculoskeletal:         General: Normal range of motion.      Cervical back: Neck supple.     Neurological: She is alert. She is disoriented. GCS eye subscore is 4. GCS verbal subscore is 4. GCS motor subscore is 6.   Skin: Skin is warm and dry.   Psychiatric: She has a normal mood and affect. Her speech is normal. Thought content normal.         RESULTS:  Labs Reviewed   COMPREHENSIVE METABOLIC PANEL - Abnormal; Notable for the following components:       Result Value    Sodium 133 (*)     Glucose 156 (*)     Creatinine 1.32 (*)     Calcium 10.4 (*)     Protein Total 9.4 (*)     Globulin 5.7 (*)     Albumin/Globulin Ratio 0.6 (*)     All other components within normal limits   URINALYSIS, REFLEX TO URINE CULTURE - Abnormal; Notable for the following components:    Protein, UA 1+ (*)     Ketones, UA 1+ (*)     Blood, UA 1+ (*)     Bacteria, UA Trace (*)     Squamous Epithelial Cells, UA Occ (*)     Mucous, UA Occ (*)     All other components within normal limits    CBC WITH DIFFERENTIAL - Abnormal; Notable for the following components:    WBC 19.21 (*)     MPV 11.6 (*)     Neut # 15.46 (*)     Mono # 1.44 (*)     IG# 0.17 (*)     All other components within normal limits   POCT GLUCOSE - Abnormal; Notable for the following components:    POCT Glucose 136 (*)     All other components within normal limits   LACTIC ACID, PLASMA - Normal   B-TYPE NATRIURETIC PEPTIDE - Normal   TROPONIN I - Normal   LACTIC ACID, PLASMA - Normal   COVID/RSV/FLU A&B PCR - Normal    Narrative:     The Xpert Xpress SARS-CoV-2/FLU/RSV plus is a rapid, multiplexed real-time PCR test intended for the simultaneous qualitative detection and differentiation of SARS-CoV-2, Influenza A, Influenza B, and respiratory syncytial virus (RSV) viral RNA in either nasopharyngeal swab or nasal swab specimens.         CBC W/ AUTO DIFFERENTIAL    Narrative:     The following orders were created for panel order CBC auto differential.  Procedure                               Abnormality         Status                     ---------                               -----------         ------                     CBC with Differential[[id number]]       Abnormal            Final result                 Please view results for these tests on the individual orders.   EXTRA TUBES    Narrative:     The following orders were created for panel order EXTRA TUBES.  Procedure                               Abnormality         Status                     ---------                               -----------         ------                     Light Blue Top Hold[[id number]]                             Final result               Gold Top Hold[[id number]]                                   Final result               Pink Top Hold[[id number]]                                   Final result                 Please view results for these tests on the individual orders.   LIGHT BLUE TOP HOLD   GOLD TOP HOLD   PINK TOP HOLD     Imaging Results               X-Ray Chest AP Portable (Final result)  Result time 05/01/24 10:20:46      Final result by Trever Gonzalez MD (05/01/24 10:20:46)                   Impression:      Changes of pulmonary vascular congestion and cardiac compensation.    Confluent opacities in the right infrahilar region superimposed infiltrate can not be completely excluded      Electronically signed by: Trever Gonzalez  Date:    05/01/2024  Time:    10:20               Narrative:    EXAMINATION:  XR CHEST AP PORTABLE    CLINICAL HISTORY:  Sepsis;    TECHNIQUE:  Single frontal view of the chest was performed.    COMPARISON:  None    FINDINGS:  Mediastinal silhouette is within normal limits cardiac silhouette is not enlarged there is increase in interstitial and pulmonary vascular markings indicating some degree of pulmonary vascular congestion and cardiac decompensation.    More confluent opacities identified in the right infrahilar region superimposed infiltrate can not be completely excluded.    No other focal consolidative changes                        Wet Read by Valeriano Ellington DO (05/01/24 10:18:41, [facility], Emergency Medicine)    Increased interstitial markings.                                     X-Ray Knee 3 View Right (Final result)  Result time 05/01/24 11:01:44      Final result by Trever Gonzalez MD (05/01/24 11:01:44)                   Impression:      Tricompartmental degenerative changes.      Electronically signed by: Trever Gonzalez  Date:    05/01/2024  Time:    11:01               Narrative:    EXAMINATION:  XR KNEE 3 VIEW RIGHT    CLINICAL HISTORY:  Unspecified fall, initial encounter    COMPARISON:  None.    FINDINGS:  No acute displaced fractures or dislocations.    There is narrowing of the medial compartment of the knee joint with presence of marginal osteophytes marginal osteophytes seen of the lateral compartment with some degenerative changes of the patellofemoral joint  articular spaces otherwise preserved with smooth articular surfaces    No blastic or lytic lesions.    Soft tissues within normal limits.                                      PROCEDURES:  Critical Care    Date/Time: 5/1/2024 10:30 AM    Performed by: Valeriano Ellington DO  Authorized by: Paulo Goodson MD  Direct patient critical care time: 25 minutes  Additional history critical care time: 2 minutes  Ordering / reviewing critical care time: 5 minutes  Documentation critical care time: 5 minutes  Total critical care time (exclusive of procedural time) : 37 minutes  Critical care time was exclusive of separately billable procedures and treating other patients.  Critical care was necessary to treat or prevent imminent or life-threatening deterioration of the following conditions: circulatory failure, sepsis, renal failure and cardiac failure.  Critical care was time spent personally by me on the following activities: development of treatment plan with patient or surrogate, interpretation of cardiac output measurements, evaluation of patient's response to treatment, examination of patient, obtaining history from patient or surrogate, ordering and performing treatments and interventions, ordering and review of laboratory studies, ordering and review of radiographic studies, pulse oximetry, re-evaluation of patient's condition and review of old charts.          ECG:  EKG Readings: (Independently Interpreted)   Initial Reading: No STEMI. Rhythm: Sinus Tachycardia. Heart Rate: 106. Ectopy: No Ectopy. Axis: Normal.       ED COURSE AND MEDICAL DECISION MAKING:  Medications   sodium chloride 0.9% bolus 2,640 mL 2,640 mL (0 mLs Intravenous Stopped 5/1/24 1035)   cefTRIAXone (ROCEPHIN) 2 g in dextrose 5 % in water (D5W) 100 mL IVPB (MB+) (0 g Intravenous Stopped 5/1/24 1007)   acetaminophen tablet 1,000 mg (1,000 mg Oral Given 5/1/24 0937)   furosemide injection 40 mg (40 mg Intravenous Given 5/1/24 1203)   lactated  ringers bolus 500 mL (0 mLs Intravenous Stopped 5/2/24 0747)   potassium bicarbonate disintegrating tablet 25 mEq (25 mEq Oral Given 5/2/24 0924)   potassium bicarbonate disintegrating tablet 25 mEq (25 mEq Oral Given 5/3/24 2009)     ED Course as of 05/13/24 0752   Wed May 01, 2024   1017 WBC(!): 19.21 [IB]   1017 Hemoglobin: 14.5 [IB]   1017 Platelet Count: 266 [IB]   1017 Lactic Acid Level: 1.6 [IB]   1030 Creatinine(!): 1.32  Increased from 0.85 a month ago. [IB]   1030 BUN: 15.9 [IB]   1030 Troponin I: 0.014 [IB]   1030 BNP: 30.1 [IB]   1058 IV fluids stopped after CXR showed pulmonary vascular congestion. [IB]      ED Course User Index  [IB] Valeriano Ellington, DO        Medical Decision Making  78-year-old female who presents via EMS with generalized weakness over the past day along with dysuria and found to be febrile and tachycardic.  Differential diagnosis includes UTI, pneumonia, viral illness.  Sepsis orders initiated upon arrival when she was given IV Rocephin to cover for suspected UTI.  Given 30 milliliter/kg fluid bolus of normal saline, however after chest x-ray showed pulmonary edema the IV fluids were stopped.  No indication for CT head as she denies striking her head or having any loss of consciousness as a result of her fall.  CBC shows a leukocytosis of 19.21.  Lactic acid is normal.  CMP shows creatinine 1.32 which is increased from 0.85 a month ago.  Chest x-ray showed pulmonary edema with right infrahilar region superimposed infiltrate not completely excluded.  Doxycycline added to cover for atypical pathogens. UA negative for nitrites and leukocyte esterase, no pyuria.  I have spoken with the patient and/or caregivers. I have explained the patient's condition, diagnoses and treatment plan based on the information available to me at this time. I have answered the patient's and/or caregiver's questions and addressed any concerns. The patient and/or caregivers have as good an understanding of the  patient's diagnosis, condition and treatment plan as can be expected at this point. The patient has been stabilized within the capability of the emergency department. The patient will be transported for further care and management or will be moved to an observation or inpatient service. I have communicated with the staff or medical practitioner taking over this patient's care.    Amount and/or Complexity of Data Reviewed  External Data Reviewed: labs and notes.  Labs: ordered. Decision-making details documented in ED Course.  Radiology: ordered and independent interpretation performed.  ECG/medicine tests: ordered and independent interpretation performed.    Risk  OTC drugs.  Prescription drug management.  Decision regarding hospitalization.        CLINICAL IMPRESSION:  1. Community acquired pneumonia of right lung, unspecified part of lung    2. Sepsis    3. Fall    4. IVORY (acute kidney injury)        DISPOSITION:   ED Disposition Condition    Observation Stable                    Valeriano Ellington,   05/03/24 0651       Valeriano Ellington,   05/13/24 0752

## 2024-05-01 NOTE — PLAN OF CARE
Problem: Pneumonia  Goal: Resolution of Infection Signs and Symptoms  Outcome: Progressing    Problem: Diabetes Comorbidity  Goal: Blood Glucose Level Within Targeted Range  Outcome: Progressing     Problem: Fall Injury Risk  Goal: Absence of Fall and Fall-Related Injury  Outcome: Progressing

## 2024-05-02 PROBLEM — E44.0 MODERATE MALNUTRITION: Status: ACTIVE | Noted: 2024-05-02

## 2024-05-02 LAB
ALBUMIN SERPL-MCNC: 3.1 G/DL (ref 3.4–4.8)
ALBUMIN/GLOB SERPL: 0.6 RATIO (ref 1.1–2)
ALP SERPL-CCNC: 54 UNIT/L (ref 40–150)
ALT SERPL-CCNC: 23 UNIT/L (ref 0–55)
AST SERPL-CCNC: 28 UNIT/L (ref 5–34)
B PERT.PT PRMT NPH QL NAA+NON-PROBE: NOT DETECTED
BASOPHILS # BLD AUTO: 0.04 X10(3)/MCL
BASOPHILS NFR BLD AUTO: 0.2 %
BILIRUB SERPL-MCNC: 0.7 MG/DL
BUN SERPL-MCNC: 22.7 MG/DL (ref 9.8–20.1)
C PNEUM DNA NPH QL NAA+NON-PROBE: NOT DETECTED
CALCIUM SERPL-MCNC: 9.7 MG/DL (ref 8.4–10.2)
CHLORIDE SERPL-SCNC: 100 MMOL/L (ref 98–107)
CO2 SERPL-SCNC: 24 MMOL/L (ref 23–31)
CREAT SERPL-MCNC: 1.27 MG/DL (ref 0.55–1.02)
EOSINOPHIL # BLD AUTO: 0.05 X10(3)/MCL (ref 0–0.9)
EOSINOPHIL NFR BLD AUTO: 0.3 %
ERYTHROCYTE [DISTWIDTH] IN BLOOD BY AUTOMATED COUNT: 13.1 % (ref 11.5–17)
EST. AVERAGE GLUCOSE BLD GHB EST-MCNC: 145.6 MG/DL
GFR SERPLBLD CREATININE-BSD FMLA CKD-EPI: 43 MLS/MIN/1.73/M2
GLOBULIN SER-MCNC: 5.5 GM/DL (ref 2.4–3.5)
GLUCOSE SERPL-MCNC: 129 MG/DL (ref 82–115)
HADV DNA NPH QL NAA+NON-PROBE: NOT DETECTED
HBA1C MFR BLD: 6.7 %
HCOV 229E RNA NPH QL NAA+NON-PROBE: NOT DETECTED
HCOV HKU1 RNA NPH QL NAA+NON-PROBE: NOT DETECTED
HCOV NL63 RNA NPH QL NAA+NON-PROBE: NOT DETECTED
HCOV OC43 RNA NPH QL NAA+NON-PROBE: NOT DETECTED
HCT VFR BLD AUTO: 39.8 % (ref 37–47)
HGB BLD-MCNC: 13.2 G/DL (ref 12–16)
HMPV RNA NPH QL NAA+NON-PROBE: NOT DETECTED
HPIV1 RNA NPH QL NAA+NON-PROBE: NOT DETECTED
HPIV2 RNA NPH QL NAA+NON-PROBE: NOT DETECTED
HPIV3 RNA NPH QL NAA+NON-PROBE: NOT DETECTED
HPIV4 RNA NPH QL NAA+NON-PROBE: NOT DETECTED
IMM GRANULOCYTES # BLD AUTO: 0.11 X10(3)/MCL (ref 0–0.04)
IMM GRANULOCYTES NFR BLD AUTO: 0.6 %
LYMPHOCYTES # BLD AUTO: 2.28 X10(3)/MCL (ref 0.6–4.6)
LYMPHOCYTES NFR BLD AUTO: 12.7 %
M PNEUMO DNA NPH QL NAA+NON-PROBE: NOT DETECTED
MCH RBC QN AUTO: 27.3 PG (ref 27–31)
MCHC RBC AUTO-ENTMCNC: 33.2 G/DL (ref 33–36)
MCV RBC AUTO: 82.4 FL (ref 80–94)
MONOCYTES # BLD AUTO: 1.62 X10(3)/MCL (ref 0.1–1.3)
MONOCYTES NFR BLD AUTO: 9 %
NEUTROPHILS # BLD AUTO: 13.81 X10(3)/MCL (ref 2.1–9.2)
NEUTROPHILS NFR BLD AUTO: 77.2 %
NRBC BLD AUTO-RTO: 0 %
PLATELET # BLD AUTO: 243 X10(3)/MCL (ref 130–400)
PMV BLD AUTO: 11.6 FL (ref 7.4–10.4)
POCT GLUCOSE: 140 MG/DL (ref 70–110)
POCT GLUCOSE: 141 MG/DL (ref 70–110)
POCT GLUCOSE: 160 MG/DL (ref 70–110)
POCT GLUCOSE: 163 MG/DL (ref 70–110)
POTASSIUM SERPL-SCNC: 3.4 MMOL/L (ref 3.5–5.1)
PROT SERPL-MCNC: 8.6 GM/DL (ref 5.8–7.6)
RBC # BLD AUTO: 4.83 X10(6)/MCL (ref 4.2–5.4)
RSV RNA NPH QL NAA+NON-PROBE: NOT DETECTED
RV+EV RNA NPH QL NAA+NON-PROBE: NOT DETECTED
SODIUM SERPL-SCNC: 136 MMOL/L (ref 136–145)
TSH SERPL-ACNC: 0.62 UIU/ML (ref 0.35–4.94)
WBC # SPEC AUTO: 17.91 X10(3)/MCL (ref 4.5–11.5)

## 2024-05-02 PROCEDURE — 94761 N-INVAS EAR/PLS OXIMETRY MLT: CPT

## 2024-05-02 PROCEDURE — 83036 HEMOGLOBIN GLYCOSYLATED A1C: CPT | Performed by: STUDENT IN AN ORGANIZED HEALTH CARE EDUCATION/TRAINING PROGRAM

## 2024-05-02 PROCEDURE — 27000207 HC ISOLATION

## 2024-05-02 PROCEDURE — 96372 THER/PROPH/DIAG INJ SC/IM: CPT | Performed by: STUDENT IN AN ORGANIZED HEALTH CARE EDUCATION/TRAINING PROGRAM

## 2024-05-02 PROCEDURE — 97162 PT EVAL MOD COMPLEX 30 MIN: CPT

## 2024-05-02 PROCEDURE — 84443 ASSAY THYROID STIM HORMONE: CPT | Performed by: STUDENT IN AN ORGANIZED HEALTH CARE EDUCATION/TRAINING PROGRAM

## 2024-05-02 PROCEDURE — 80053 COMPREHEN METABOLIC PANEL: CPT | Performed by: STUDENT IN AN ORGANIZED HEALTH CARE EDUCATION/TRAINING PROGRAM

## 2024-05-02 PROCEDURE — 36415 COLL VENOUS BLD VENIPUNCTURE: CPT | Performed by: STUDENT IN AN ORGANIZED HEALTH CARE EDUCATION/TRAINING PROGRAM

## 2024-05-02 PROCEDURE — 84100 ASSAY OF PHOSPHORUS: CPT

## 2024-05-02 PROCEDURE — 85025 COMPLETE CBC W/AUTO DIFF WBC: CPT | Performed by: STUDENT IN AN ORGANIZED HEALTH CARE EDUCATION/TRAINING PROGRAM

## 2024-05-02 PROCEDURE — 25000003 PHARM REV CODE 250: Performed by: STUDENT IN AN ORGANIZED HEALTH CARE EDUCATION/TRAINING PROGRAM

## 2024-05-02 PROCEDURE — 25000003 PHARM REV CODE 250

## 2024-05-02 PROCEDURE — 83735 ASSAY OF MAGNESIUM: CPT

## 2024-05-02 PROCEDURE — 96361 HYDRATE IV INFUSION ADD-ON: CPT

## 2024-05-02 PROCEDURE — 63600175 PHARM REV CODE 636 W HCPCS: Performed by: STUDENT IN AN ORGANIZED HEALTH CARE EDUCATION/TRAINING PROGRAM

## 2024-05-02 PROCEDURE — 99900035 HC TECH TIME PER 15 MIN (STAT)

## 2024-05-02 PROCEDURE — 63600175 PHARM REV CODE 636 W HCPCS

## 2024-05-02 PROCEDURE — 21400001 HC TELEMETRY ROOM

## 2024-05-02 RX ADMIN — AMLODIPINE BESYLATE 10 MG: 10 TABLET ORAL at 09:05

## 2024-05-02 RX ADMIN — DULOXETINE HYDROCHLORIDE 20 MG: 20 CAPSULE, DELAYED RELEASE ORAL at 09:05

## 2024-05-02 RX ADMIN — ACETAMINOPHEN 650 MG: 325 TABLET, FILM COATED ORAL at 06:05

## 2024-05-02 RX ADMIN — SODIUM CHLORIDE, POTASSIUM CHLORIDE, SODIUM LACTATE AND CALCIUM CHLORIDE 500 ML: 600; 310; 30; 20 INJECTION, SOLUTION INTRAVENOUS at 06:05

## 2024-05-02 RX ADMIN — GABAPENTIN 300 MG: 300 CAPSULE ORAL at 04:05

## 2024-05-02 RX ADMIN — CEFTRIAXONE SODIUM 2 G: 2 INJECTION, POWDER, FOR SOLUTION INTRAMUSCULAR; INTRAVENOUS at 04:05

## 2024-05-02 RX ADMIN — POTASSIUM BICARBONATE 25 MEQ: 977.5 TABLET, EFFERVESCENT ORAL at 09:05

## 2024-05-02 RX ADMIN — ATORVASTATIN CALCIUM 40 MG: 40 TABLET, FILM COATED ORAL at 09:05

## 2024-05-02 RX ADMIN — HEPARIN SODIUM 5000 UNITS: 5000 INJECTION, SOLUTION INTRAVENOUS; SUBCUTANEOUS at 09:05

## 2024-05-02 RX ADMIN — METOPROLOL SUCCINATE 50 MG: 50 TABLET, FILM COATED, EXTENDED RELEASE ORAL at 09:05

## 2024-05-02 RX ADMIN — GABAPENTIN 300 MG: 300 CAPSULE ORAL at 09:05

## 2024-05-02 RX ADMIN — AZITHROMYCIN MONOHYDRATE 500 MG: 500 INJECTION, POWDER, LYOPHILIZED, FOR SOLUTION INTRAVENOUS at 05:05

## 2024-05-02 RX ADMIN — ACETAMINOPHEN 650 MG: 325 TABLET, FILM COATED ORAL at 12:05

## 2024-05-02 NOTE — PROGRESS NOTES
[facility] Medicine Wards   Progress Note     Resident Team: [facility] Medicine List 1  Attending Physician: Paulo Goodson MD  Resident: Chandler Mendieta DO   Intern: Chapin Webb DO   Date of Admit: 5/1/2024    Subjective:      Brief HPI:  Charlotte Choi is a 78 y.o. female with a history of hypertension, diabetes, hyperlipidemia who presented to [facility] ED on 5/1/2024  with complaint of Falls and weakness ongoing since Sunday.  Patient states that on Sunday she began to feel weakness with malaise, did not leave her bed.  Patient has experienced poor p.o. intake during this time.  She states that since then she is lost track of time and remained in bed.  Yesterday patient attempted to go to the bathroom and experienced a fall, after which she remained on the floor until today.  She experienced another fall again today.  Patient denies any lightheadedness prior to these falls and denies any loss of consciousness.  She denies any head trauma.  She endorses watery diarrhea approximately twice a day without blood, shortness and breath at rest, dry cough, dysuria, fever and chills that began yesterday.  She denies any nausea, vomiting, headache, chest pain, seizures or postictal state.  Patient's daughter at bedside endorses that the mother did lose bladder control today.     ED:  Patient febrile with a T-max of 101.7°, tachypneic 28, hypertensive 163/89, normoxic on RA.  CBC reveals a leukocytosis of 19.2 with a left shift.  CMP notable for hyponatremia 133, elevated serum creatinine at 1.32 (baseline 0.8), hyperglycemia 156, hypercalcemia 10.4.  BNP and troponin both negative.  Lactic acid within normal limits.  Flu, COVID, RSV negative.  UA unremarkable.  Chest x-ray shows changes of pulmonary vascular congestion and cardiac compensation, confluent opacities in the right infrahilar region superimposed infiltrate can not be completely excluded.  Patient was given 1 L normal saline and 1 g ceftriaxone in ED. she was admitted to  internal medicine for management of sepsis due to community-acquired pneumonia.    Interval History:  Patient continues to be febrile overnight.  White count downtrending, IVORY resolving.  Respiratory panel negative.  Repeat chest x-ray two-view clearly demonstrates right middle lobe pneumonia.  We will continue azithromycin and Rocephin for community-acquired pneumonia.    Review of Systems:  12 point review of symptoms negative unless otherwise stated above       Objective:     Vital Signs (Most Recent):  Temp: 98.9 °F (37.2 °C) (05/02/24 1157)  Pulse: 81 (05/02/24 1306)  Resp: 16 (05/02/24 1306)  BP: 127/71 (05/02/24 1157)  SpO2: (!) 94 % (05/02/24 1306) Vital Signs (24h Range):  Temp:  [98.9 °F (37.2 °C)-103.2 °F (39.6 °C)] 98.9 °F (37.2 °C)  Pulse:  [] 81  Resp:  [16-17] 16  SpO2:  [90 %-96 %] 94 %  BP: (103-178)/(63-90) 127/71      Physical Exam:  GEN: A&Ox4. No acute distress   HEENT: normocephalic, atraumatic. PERRLA. EMOI bilaterally. Sclera, conjunctiva clear. Nares patents. Oropharyngeal mucosa moist, non-erythematous, non-edematous, uvula midline. Neck supple without LAD   CARDIAC: S1 S2, no MRG. Radial pulses full, no LE edema   RESPI: Chest wall rise symmetric, atraumatic. Lungs CTAB, no wheezing or rhonchi   ABDOMEN: soft, nontender, BS present in all 4 quadrants. No herniation, masses, HSM  : deferred   MSK: ROM grossly intact.   NEURO: Motor and sensation grossly intact. CN grossly intact. No cerebellar deficits noted. No gait abnormalities noted.   PSYCH: Memory and thought process appear improved relative to admission. Appropriate mood and affect. No AI/HI. No HI/SI .       Laboratory    CBC  Recent Labs   Lab 05/01/24  0944 05/02/24  0452   WBC 19.21* 17.91*   RBC 5.31 4.83   HGB 14.5 13.2   HCT 44.0 39.8   MCV 82.9 82.4   MCH 27.3 27.3   MCHC 33.0 33.2   RDW 13.1 13.1    243   MPV 11.6* 11.6*       CMP   Recent Labs   Lab 05/01/24  0944 05/02/24  0452   * 136   K 3.8 3.4*    CHLORIDE 98 100   CO2 23 24   BUN 15.9 22.7*   CREATININE 1.32* 1.27*   CALCIUM 10.4* 9.7   ALBUMIN 3.7 3.1*   ALKPHOS 60 54   ALT 20 23   AST 25 28   BILITOT 0.8 0.7        Microbiology:  Microbiology Results (last 7 days)       Procedure Component Value Units Date/Time    Blood culture x two cultures. Draw prior to antibiotics. [[id number]]  (Normal) Collected: 05/01/24 0943    Order Status: Completed Specimen: Blood from Antecubital, Left Updated: 05/02/24 1500     CULTURE, BLOOD ([facility]) No Growth At 24 Hours    Blood culture x two cultures. Draw prior to antibiotics. [[id number]]  (Normal) Collected: 05/01/24 0944    Order Status: Completed Specimen: Blood Updated: 05/02/24 1500     CULTURE, BLOOD ([facility]) No Growth At 24 Hours    Clostridium Diff Toxin, A & B, EIA [[id number]]     Order Status: Sent Specimen: Stool     Respiratory Culture [[id number]]     Order Status: Sent Specimen: Sputum             Cardiac Data:  No echocardiogram results found for the past 14 days.    Results for orders placed or performed during the hospital encounter of 05/01/24   EKG 12-lead    Collection Time: 05/01/24  9:41 AM   Result Value Ref Range    QRS Duration 88 ms    [facility] QTC Calculation 425 ms    Narrative    Test Reason : A41.9,    Vent. Rate : 106 BPM     Atrial Rate : 106 BPM     P-R Int : 138 ms          QRS Dur : 088 ms      QT Int : 320 ms       P-R-T Axes : 054 037 044 degrees     QTc Int : 425 ms    Sinus tachycardia  Possible Left atrial enlargement  Borderline Abnormal ECG  When compared with ECG of 16-JAN-2023 18:45,  No significant change was found  Confirmed by Gretta Fiore MD ([id number]) on 5/1/2024 6:04:44 PM    Referred By:             Confirmed By:Gretta Fiore MD        Radiology:  Imaging Results              X-Ray Chest AP Portable (Final result)  Result time 05/01/24 10:20:46      Final result by Trever Gonzalez MD (05/01/24 10:20:46)                   Impression:      Changes of pulmonary vascular  congestion and cardiac compensation.    Confluent opacities in the right infrahilar region superimposed infiltrate can not be completely excluded      Electronically signed by: Trever Gonzalez  Date:    05/01/2024  Time:    10:20               Narrative:    EXAMINATION:  XR CHEST AP PORTABLE    CLINICAL HISTORY:  Sepsis;    TECHNIQUE:  Single frontal view of the chest was performed.    COMPARISON:  None    FINDINGS:  Mediastinal silhouette is within normal limits cardiac silhouette is not enlarged there is increase in interstitial and pulmonary vascular markings indicating some degree of pulmonary vascular congestion and cardiac decompensation.    More confluent opacities identified in the right infrahilar region superimposed infiltrate can not be completely excluded.    No other focal consolidative changes                        Wet Read by Valeriano Ellington DO (05/01/24 10:18:41, [facility], Emergency Medicine)    Increased interstitial markings.                                     X-Ray Knee 3 View Right (Final result)  Result time 05/01/24 11:01:44      Final result by Trever Gonzalez MD (05/01/24 11:01:44)                   Impression:      Tricompartmental degenerative changes.      Electronically signed by: Trever Gonzalez  Date:    05/01/2024  Time:    11:01               Narrative:    EXAMINATION:  XR KNEE 3 VIEW RIGHT    CLINICAL HISTORY:  Unspecified fall, initial encounter    COMPARISON:  None.    FINDINGS:  No acute displaced fractures or dislocations.    There is narrowing of the medial compartment of the knee joint with presence of marginal osteophytes marginal osteophytes seen of the lateral compartment with some degenerative changes of the patellofemoral joint articular spaces otherwise preserved with smooth articular surfaces    No blastic or lytic lesions.    Soft tissues within normal limits.                                      Current Medications:      Infusions:  Current Facility-Administered Medications   Medication Dose Route Frequency Last Rate Last Admin        Scheduled:  Current Facility-Administered Medications   Medication Dose Route Frequency    amLODIPine  10 mg Oral Daily    atorvastatin  40 mg Oral Daily    azithromycin  500 mg Intravenous Q24H    cefTRIAXone (Rocephin) IV (PEDS and ADULTS)  2 g Intravenous Q24H    DULoxetine  20 mg Oral Daily    gabapentin  300 mg Oral TID    heparin (porcine)  5,000 Units Subcutaneous Q12H    metoprolol succinate  50 mg Oral Daily        PRN:    Current Facility-Administered Medications:     acetaminophen, 650 mg, Oral, Q6H PRN    albuterol, 2 puff, Inhalation, Q6H PRN    dextrose 10%, 12.5 g, Intravenous, PRN    dextrose 10%, 25 g, Intravenous, PRN    glucagon (human recombinant), 1 mg, Intramuscular, PRN    glucose, 16 g, Oral, PRN    glucose, 24 g, Oral, PRN    insulin aspart U-100, 0-10 Units, Subcutaneous, QID (AC + HS) PRN    melatonin, 6 mg, Oral, Nightly PRN    ondansetron, 4 mg, Intravenous, Q8H PRN    sodium chloride 0.9%, 10 mL, Intravenous, PRN    Antibiotics and Day Number of Therapy:  Azithro and Rocephin day 2        Intake/Output Summary (Last 24 hours) at 5/2/2024 1537  Last data filed at 5/2/2024 1450  Gross per 24 hour   Intake 765.42 ml   Output 350 ml   Net 415.42 ml       Lines/Drains/Airways       Peripheral Intravenous Line  Duration                  Peripheral IV - Single Lumen 05/01/24 0926 20 G Right Antecubital 1 day         Peripheral IV - Single Lumen 05/01/24 0934 22 G Posterior;Right Hand 1 day                      Assessment & Plan:     Sepsis 2/2 CAP   Poor PO Intake   Weakness and Falls 2/2 Above   - SIRS 3/4 - WBC, Temp, Tachypnea   - 1L NS, 1g Rocephin given in ED   - CXR above   - Azithro and Rocephin   - Proair q6 PRN, APAP for fever   - DuoNebs, incentive spirometry  - F/u RCX, Respi panel, legionella urine Ag, BCX   - CTM labs      IVORY   - Cr 1.32 (BL 0.8)   - Holding home  ACE   - Avoid nephrotoxins  - CTM and rehydrate appropriately      T2DM   - SSI   - Holding Metformin   - CTM with POC     HTN   - Toprolol 50 and Amlodipine 10   - Holding lisinopril            CODE STATUS:  Full  Access:  PIV x2  Antibiotics:  Azithro and Rocephin day 2  Diet:  Diabetic   DVT Prophylaxis:  Hep 5000  GI Prophylaxis:  None  Fluids:  None      Dispo:  78-year-old female admitted to Internal Medicine for sepsis due to CAP    Chapin Webb DO  Internal Medicine - PGY-1

## 2024-05-02 NOTE — PT/OT/SLP PROGRESS
"OT attempted at 1346 pt sleeping in bed, aroused to OT but declined to participate in therapy at this time. Pt stated "not right now. I have a headache. I went for an xray and I got drunk drunk now I have a headache"  Nursing notified.   F/u tomorrow.   "

## 2024-05-02 NOTE — ASSESSMENT & PLAN NOTE
Patient meets ASPEN criteria for moderate malnutrition of chronic illness per RD assessment as evidenced by:  Energy Intake (Malnutrition): less than 75% for greater than or equal to 1 month  Weight Loss (Malnutrition):  (Does not meet criteria)  Subcutaneous Fat (Malnutrition): mild depletion              A minimum of two characteristics is recommended for diagnosis of either severe or non-severe malnutrition.

## 2024-05-02 NOTE — PROGRESS NOTES
Inpatient Nutrition Assessment    Admit Date: 5/1/2024   Total duration of encounter: 1 day   Patient Age: 78 y.o.    Nutrition Recommendation/Prescription     Continue 1800 kcal, Diabetic diet as tolerated  Will send Boost Glucose Control TID (provides 190 kcal, 16 g protein per serving)  Consider daily MVI  Replenish electrolytes as needed  Monitor po intake, wt, labs    Communication of Recommendations: reviewed with nurse and reviewed with patient    Nutrition Assessment     Malnutrition Assessment/Nutrition-Focused Physical Exam    Malnutrition Context: chronic illness (05/02/24 1536)  Malnutrition Level: moderate (05/02/24 1536)  Energy Intake (Malnutrition): less than 75% for greater than or equal to 1 month (05/02/24 1536)  Weight Loss (Malnutrition):  (Does not meet criteria) (05/02/24 1536)  Subcutaneous Fat (Malnutrition): mild depletion (05/02/24 1536)     Upper Arm Region (Subcutaneous Fat Loss): mild depletion                                         A minimum of two characteristics is recommended for diagnosis of either severe or non-severe malnutrition.    Chart Review    Reason Seen: continuous nutrition monitoring    Malnutrition Screening Tool Results   Have you recently lost weight without trying?: No  Have you been eating poorly because of a decreased appetite?: No   MST Score: 0     Diagnosis: Sepsis 2/2 CAP   Poor PO Intake   Weakness and Falls 2/2 Above  IVORY  T2DM  HTN    Relevant Medical History: hypertension, diabetes, hyperlipidemia    Scheduled Medications:  amLODIPine, 10 mg, Daily  atorvastatin, 40 mg, Daily  azithromycin, 500 mg, Q24H  cefTRIAXone (Rocephin) IV (PEDS and ADULTS), 2 g, Q24H  DULoxetine, 20 mg, Daily  gabapentin, 300 mg, TID  heparin (porcine), 5,000 Units, Q12H  metoprolol succinate, 50 mg, Daily    Continuous Infusions:   PRN Medications:   Current Facility-Administered Medications:     acetaminophen, 650 mg, Oral, Q6H PRN    albuterol, 2 puff, Inhalation, Q6H PRN     "dextrose 10%, 12.5 g, Intravenous, PRN    dextrose 10%, 25 g, Intravenous, PRN    glucagon (human recombinant), 1 mg, Intramuscular, PRN    glucose, 16 g, Oral, PRN    glucose, 24 g, Oral, PRN    insulin aspart U-100, 0-10 Units, Subcutaneous, QID (AC + HS) PRN    melatonin, 6 mg, Oral, Nightly PRN    ondansetron, 4 mg, Intravenous, Q8H PRN    sodium chloride 0.9%, 10 mL, Intravenous, PRN    Calorie Containing IV Medications: no significant kcals from medications at this time    Recent Labs   Lab 24  0944 24  0452   * 136   K 3.8 3.4*   CALCIUM 10.4* 9.7   CHLORIDE 98 100   CO2 23 24   BUN 15.9 22.7*   CREATININE 1.32* 1.27*   EGFRNORACEVR 41 43   GLUCOSE 156* 129*   BILITOT 0.8 0.7   ALKPHOS 60 54   ALT 20 23   AST 25 28   ALBUMIN 3.7 3.1*   HGBA1C  --  6.7   WBC 19.21* 17.91*   HGB 14.5 13.2   HCT 44.0 39.8     Nutrition Orders:  Diet diabetic 1800 Calorie      Appetite/Oral Intake: poor/25-50% of meals  Factors Affecting Nutritional Intake: decreased appetite, diarrhea, and nausea  Social Needs Impacting Access to Food: none identified  Food/Christian/Cultural Preferences: none reported  Food Allergies: no known food allergies  Last Bowel Movement: 24  Wound(s):  None noted    Comments  /2: Pt reports poor po intake, consuming <50% of meals. Pt reports some nausea and diarrhea today. Pt ok to add Boost Glucose Control TID. Pt reports decreased appetite >1 month; reports recent wt loss. Pt states #; wts ~205-210 noted per EMR wt hx. Mild fat loss noted. K (L) -- replete as needed; Gluc/BUN/Creat elevated -- reflective of DM/IVORY.      Anthropometrics    Height: 5' 5" (165.1 cm), Height Method: Stated  Last Weight: 88 kg (194 lb) (24 09), Weight Method: Bed Scale  BMI (Calculated): 32.3  BMI Classification: obese grade I (BMI 30-34.9)     Ideal Body Weight (IBW), Female: 125 lb     % Ideal Body Weight, Female (lb): 155.2 %                    Usual Body Weight (UBW), k.2 " kg (?#)  % Usual Body Weight: 86.28     Usual Weight Provided By: patient and EMR weight history    Wt Readings from Last 5 Encounters:   05/01/24 88 kg (194 lb)   03/15/24 90.1 kg (198 lb 10.2 oz)   03/13/24 90 kg (198 lb 6.6 oz)   03/06/24 91.6 kg (202 lb)   01/10/24 93.8 kg (206 lb 12.8 oz)     Weight Change(s) Since Admission:   Wt Readings from Last 1 Encounters:   05/01/24 0913 88 kg (194 lb)   Admit Weight: 88 kg (194 lb) (05/01/24 0913), Weight Method: Bed Scale    Estimated Needs    Weight Used For Calorie Calculations: 88 kg (194 lb 0.1 oz)  Energy Calorie Requirements (kcal): 1020-4570 kcal (20-22 kcal/kg)  Energy Need Method: Kcal/kg  Weight Used For Protein Calculations: 88 kg (194 lb 0.1 oz)  Protein Requirements: 88g (1.0 g/kg -- IVORY)  Fluid Requirements (mL): 0006-3822 mL or per MD (1mL/kcal)  CHO Requirement: 225g     Enteral Nutrition     Patient not receiving enteral nutrition at this time.    Parenteral Nutrition     Patient not receiving parenteral nutrition support at this time.    Evaluation of Received Nutrient Intake    Calories: not meeting estimated needs  Protein: not meeting estimated needs    Patient Education     Not applicable.    Nutrition Diagnosis     PES: Moderate chronic disease or condition related malnutrition related to chronic illness as evidenced by less than 75% needs met for greater than or equal to 1 month and mild fat depletion. (new)    Nutrition Interventions     Intervention(s): modified composition of meals/snacks, commercial beverage, multivitamin/mineral supplement therapy, and collaboration with other providers    Goal: Meet greater than 80% of nutritional needs by follow-up. (new)  Goal: Maintain weight throughout hospitalization. (new)    Nutrition Goals & Monitoring     Dietitian will monitor: food and beverage intake, weight, electrolyte/renal panel, glucose/endocrine profile, and gastrointestinal profile  Discharge planning: continue Diabetic  diet  Nutrition Risk/Follow-Up: moderate (follow-up in 3-5 days)   Please consult if re-assessment needed sooner.

## 2024-05-02 NOTE — PT/OT/SLP EVAL
Physical Therapy Evaluation    Patient Name:  Charlotte Choi   MRN:  [id number]    Recommendations:     Therapy Intensity Recommendations at Discharge: Low Intensity Therapy  Discharge Equipment Recommendations: walker, rolling   Equipment to be obtained for discharge: rolling walker, shower chair.  Barriers to discharge: fall risk    Assessment:     Charlotte Choi is a 78 y.o. female admitted with a medical diagnosis of  Sepsis due to pneumonia.  She presents with the following impairments/functional limitations:  weakness, impaired functional mobility, gait instability, impaired endurance, decreased lower extremity function, impaired balance.    Rehab Prognosis: Good.    Patient would benefit from continued skilled acute PT services to: address above listed impairments/functional limitations; receive patient/caregiver education; reduce fall risk; and maximize independency/safety with functional mobility.    Recent Surgery: * No surgery found *      Plan:     During this hospitalization, patient to be seen 3 x/week to address the identified impairments/functional limitations via gait training, therapeutic activities, therapeutic exercises and progress toward the established goals.    Plan of Care Expires:  06/01/24    Subjective     Communicated with patient's nurse Na prior to session.    Patient agreeable to participate in evaluation.     Chief Complaint: none stated  Patient/Family Comments/goals: none stated  Pain/Comfort:  Pain Rating 1: 0/10  Pain Rating Post-Intervention 1: 0/10    Patients cultural, spiritual, Pentecostal conflicts given the current situation: no    Social History  Living Environment: Patient lives with their family in a single level home, with ramp, with tub-shower combo.  Functional Level: Prior to admission patient was independent in ADL's.  Equipment Used at Home: cane, straight, rollator  Equipment owned (not currently used): none.  Assistance Upon Discharge:  family.    Objective:     Patient found supine in bed and with HOB elevated with peripheral IV  upon PT entry to room.    General Precautions: Standard, fall   Orthopedic Precautions:N/A   Braces: N/A  Respiratory Status: room air    Exams:  Orientation: Patient is oriented to person, place, time, situation  Commands: Patient follows commands consistently  RLE ROM: WFL  RLE Strength: WFL  LLE ROM: WFL  LLE Strength: WFL    Functional Mobility:    Bed Mobility:  Supine to Sit: minimum assistance  Sit to Supine: contact guard assistance    Transfers:  Sit to Stand: contact guard assistance with rolling walker    Gait:  Patient ambulated 20ft with rolling walker and contact guard assistance.  Patient demonstrates :       occasional unsteady gait.    Other Mobility:  not assessed    Balance:  Sit  Static: NORMAL: No deviations seen in posture held statically  Dynamic: FAIR+: Maintains balance through MINIMAL excursions of active trunk motion  Stand  Static: POOR+: Needs MINIMAL assist to maintain  Dynamic: POOR+: Needs MIN (minimal ) assist during gait    Additional Treatment Session  n/a    Patient left supine in bed and with HOB elevated with all lines intact, call button in reach, tray table at bedside, and patient' nurse notified.    Education     Patient was instructed to utilize staff assistance for mobility/transfers.  White board updated regarding patient's safest level of mobility with staff assistance.    Goals     Multidisciplinary Problems       Physical Therapy Goals          Problem: Physical Therapy    Goal Priority Disciplines Outcome Goal Variances Interventions   Physical Therapy Goal     PT, PT/OT      Description: Goals to be met by: D/C     Patient will increase functional independence with mobility by performin. Supine to sit with Modified [place]  2. Sit to stand transfer with Modified [place]  3. Gait  x 200 feet with Modified [place] using Rolling Walker.                         History:     Past Medical History:   Diagnosis Date    Diabetes mellitus     Hypertension      Past Surgical History:   Procedure Laterality Date    HYSTERECTOMY      MAGNETIC RESONANCE IMAGING N/A 3/15/2024    Procedure: MRI (Magnetic Resonance Imagine);  Surgeon: Susanne Ellington MD;  Location: [facility];  Service: Anesthesiology;  Laterality: N/A;  MRI BRAIN W W/O CONTRAST WITH ANESTHESIA     Time Tracking:     PT Received On: 05/02/24  PT Start Time: 1020     PT Stop Time: 1033  PT Total Time (min): 13 min     Billable Minutes: Evaluation 13    05/02/2024

## 2024-05-02 NOTE — PT/OT/SLP PROGRESS
Physical Therapy    Missed Treatment Session - cancel note    Patient Name:  Charlotte Choi   MRN:  [id number]      Patient not seen at this time secondary to Other (Comment) (Nursing bathing the patient.).    Will follow-up as patient is appropriate/available/agreeable to participate and as therapists' schedule allows.

## 2024-05-03 LAB
ALBUMIN SERPL-MCNC: 2.7 G/DL (ref 3.4–4.8)
ALBUMIN/GLOB SERPL: 0.5 RATIO (ref 1.1–2)
ALP SERPL-CCNC: 64 UNIT/L (ref 40–150)
ALT SERPL-CCNC: 25 UNIT/L (ref 0–55)
AST SERPL-CCNC: 30 UNIT/L (ref 5–34)
BASOPHILS # BLD AUTO: 0.07 X10(3)/MCL
BASOPHILS NFR BLD AUTO: 0.5 %
BILIRUB SERPL-MCNC: 0.4 MG/DL
BUN SERPL-MCNC: 18.5 MG/DL (ref 9.8–20.1)
CALCIUM SERPL-MCNC: 9.6 MG/DL (ref 8.4–10.2)
CHLORIDE SERPL-SCNC: 101 MMOL/L (ref 98–107)
CO2 SERPL-SCNC: 23 MMOL/L (ref 23–31)
CREAT SERPL-MCNC: 0.92 MG/DL (ref 0.55–1.02)
EOSINOPHIL # BLD AUTO: 0.05 X10(3)/MCL (ref 0–0.9)
EOSINOPHIL NFR BLD AUTO: 0.4 %
ERYTHROCYTE [DISTWIDTH] IN BLOOD BY AUTOMATED COUNT: 13.1 % (ref 11.5–17)
GFR SERPLBLD CREATININE-BSD FMLA CKD-EPI: >60 MLS/MIN/1.73/M2
GLOBULIN SER-MCNC: 5.1 GM/DL (ref 2.4–3.5)
GLUCOSE SERPL-MCNC: 122 MG/DL (ref 82–115)
HCT VFR BLD AUTO: 37.9 % (ref 37–47)
HGB BLD-MCNC: 12.4 G/DL (ref 12–16)
IMM GRANULOCYTES # BLD AUTO: 0.06 X10(3)/MCL (ref 0–0.04)
IMM GRANULOCYTES NFR BLD AUTO: 0.4 %
LEGIONELLA AG UR QL: NEGATIVE
LYMPHOCYTES # BLD AUTO: 2.68 X10(3)/MCL (ref 0.6–4.6)
LYMPHOCYTES NFR BLD AUTO: 19.4 %
MAGNESIUM SERPL-MCNC: 2.1 MG/DL (ref 1.6–2.6)
MAGNESIUM SERPL-MCNC: 2.2 MG/DL (ref 1.6–2.6)
MCH RBC QN AUTO: 26.9 PG (ref 27–31)
MCHC RBC AUTO-ENTMCNC: 32.7 G/DL (ref 33–36)
MCV RBC AUTO: 82.2 FL (ref 80–94)
MONOCYTES # BLD AUTO: 1.43 X10(3)/MCL (ref 0.1–1.3)
MONOCYTES NFR BLD AUTO: 10.4 %
NEUTROPHILS # BLD AUTO: 9.5 X10(3)/MCL (ref 2.1–9.2)
NEUTROPHILS NFR BLD AUTO: 68.9 %
NRBC BLD AUTO-RTO: 0 %
PHOSPHATE SERPL-MCNC: 3.1 MG/DL (ref 2.3–4.7)
PLATELET # BLD AUTO: 265 X10(3)/MCL (ref 130–400)
PMV BLD AUTO: 11.5 FL (ref 7.4–10.4)
POCT GLUCOSE: 115 MG/DL (ref 70–110)
POCT GLUCOSE: 126 MG/DL (ref 70–110)
POCT GLUCOSE: 132 MG/DL (ref 70–110)
POTASSIUM SERPL-SCNC: 3.2 MMOL/L (ref 3.5–5.1)
PROT SERPL-MCNC: 7.8 GM/DL (ref 5.8–7.6)
RBC # BLD AUTO: 4.61 X10(6)/MCL (ref 4.2–5.4)
SODIUM SERPL-SCNC: 136 MMOL/L (ref 136–145)
WBC # SPEC AUTO: 13.79 X10(3)/MCL (ref 4.5–11.5)

## 2024-05-03 PROCEDURE — 25000003 PHARM REV CODE 250

## 2024-05-03 PROCEDURE — 25000003 PHARM REV CODE 250: Performed by: STUDENT IN AN ORGANIZED HEALTH CARE EDUCATION/TRAINING PROGRAM

## 2024-05-03 PROCEDURE — 94761 N-INVAS EAR/PLS OXIMETRY MLT: CPT

## 2024-05-03 PROCEDURE — 85025 COMPLETE CBC W/AUTO DIFF WBC: CPT | Performed by: STUDENT IN AN ORGANIZED HEALTH CARE EDUCATION/TRAINING PROGRAM

## 2024-05-03 PROCEDURE — 36415 COLL VENOUS BLD VENIPUNCTURE: CPT | Performed by: STUDENT IN AN ORGANIZED HEALTH CARE EDUCATION/TRAINING PROGRAM

## 2024-05-03 PROCEDURE — 83735 ASSAY OF MAGNESIUM: CPT

## 2024-05-03 PROCEDURE — 97116 GAIT TRAINING THERAPY: CPT

## 2024-05-03 PROCEDURE — 21400001 HC TELEMETRY ROOM

## 2024-05-03 PROCEDURE — 63600175 PHARM REV CODE 636 W HCPCS

## 2024-05-03 PROCEDURE — 80053 COMPREHEN METABOLIC PANEL: CPT | Performed by: STUDENT IN AN ORGANIZED HEALTH CARE EDUCATION/TRAINING PROGRAM

## 2024-05-03 PROCEDURE — 99900035 HC TECH TIME PER 15 MIN (STAT)

## 2024-05-03 PROCEDURE — 97166 OT EVAL MOD COMPLEX 45 MIN: CPT

## 2024-05-03 PROCEDURE — 63600175 PHARM REV CODE 636 W HCPCS: Performed by: STUDENT IN AN ORGANIZED HEALTH CARE EDUCATION/TRAINING PROGRAM

## 2024-05-03 RX ADMIN — AMLODIPINE BESYLATE 10 MG: 10 TABLET ORAL at 08:05

## 2024-05-03 RX ADMIN — GABAPENTIN 300 MG: 300 CAPSULE ORAL at 08:05

## 2024-05-03 RX ADMIN — ATORVASTATIN CALCIUM 40 MG: 40 TABLET, FILM COATED ORAL at 08:05

## 2024-05-03 RX ADMIN — POTASSIUM BICARBONATE 25 MEQ: 977.5 TABLET, EFFERVESCENT ORAL at 08:05

## 2024-05-03 RX ADMIN — METOPROLOL SUCCINATE 50 MG: 50 TABLET, FILM COATED, EXTENDED RELEASE ORAL at 08:05

## 2024-05-03 RX ADMIN — HEPARIN SODIUM 5000 UNITS: 5000 INJECTION, SOLUTION INTRAVENOUS; SUBCUTANEOUS at 08:05

## 2024-05-03 RX ADMIN — AZITHROMYCIN MONOHYDRATE 500 MG: 500 INJECTION, POWDER, LYOPHILIZED, FOR SOLUTION INTRAVENOUS at 03:05

## 2024-05-03 RX ADMIN — GABAPENTIN 300 MG: 300 CAPSULE ORAL at 03:05

## 2024-05-03 RX ADMIN — DULOXETINE HYDROCHLORIDE 20 MG: 20 CAPSULE, DELAYED RELEASE ORAL at 09:05

## 2024-05-03 RX ADMIN — CEFTRIAXONE SODIUM 2 G: 2 INJECTION, POWDER, FOR SOLUTION INTRAMUSCULAR; INTRAVENOUS at 04:05

## 2024-05-03 NOTE — PLAN OF CARE
Problem: Occupational Therapy  Goal: Occupational Therapy Goal  Description: Pt will ambulate to bathroom with RW mod I  Pt will complete toilet t/f mod I  Pt will complete toileting taks mod I  Pt will complete grooming in standing at sink  Outcome: Progressing

## 2024-05-03 NOTE — PLAN OF CARE
Problem: Adult Inpatient Plan of Care  Goal: Plan of Care Review  Outcome: Adequate for Care Transition  Goal: Patient-Specific Goal (Individualized)  Outcome: Adequate for Care Transition  Goal: Absence of Hospital-Acquired Illness or Injury  Outcome: Adequate for Care Transition  Goal: Optimal Comfort and Wellbeing  Outcome: Adequate for Care Transition  Goal: Readiness for Transition of Care  Outcome: Adequate for Care Transition     Problem: Sepsis/Septic Shock  Goal: Optimal Coping  Outcome: Adequate for Care Transition  Goal: Absence of Bleeding  Outcome: Adequate for Care Transition  Goal: Blood Glucose Level Within Targeted Range  Outcome: Adequate for Care Transition  Goal: Absence of Infection Signs and Symptoms  Outcome: Adequate for Care Transition  Goal: Optimal Nutrition Intake  Outcome: Adequate for Care Transition     Problem: Acute Kidney Injury/Impairment  Goal: Fluid and Electrolyte Balance  Outcome: Adequate for Care Transition  Goal: Improved Oral Intake  Outcome: Adequate for Care Transition  Goal: Effective Renal Function  Outcome: Adequate for Care Transition     Problem: Pneumonia  Goal: Fluid Balance  Outcome: Adequate for Care Transition  Goal: Resolution of Infection Signs and Symptoms  Outcome: Adequate for Care Transition  Goal: Effective Oxygenation and Ventilation  Outcome: Adequate for Care Transition     Problem: Diabetes Comorbidity  Goal: Blood Glucose Level Within Targeted Range  Outcome: Adequate for Care Transition     Problem: Fall Injury Risk  Goal: Absence of Fall and Fall-Related Injury  Outcome: Adequate for Care Transition     Problem: Skin Injury Risk Increased  Goal: Skin Health and Integrity  Outcome: Adequate for Care Transition     Problem: Infection  Goal: Absence of Infection Signs and Symptoms  Outcome: Adequate for Care Transition

## 2024-05-03 NOTE — PLAN OF CARE
05/02/24 1300   Discharge Assessment   Assessment Type Discharge Planning Assessment   Confirmed/corrected address, phone number and insurance Yes   Confirmed Demographics Correct on Facesheet   Source of Information patient   When was your last doctors appointment?   (Pawan Oneil)   Reason For Admission Fall,IVORY,Sepsis,CAP   People in Home alone   Do you expect to return to your current living situation? Yes   Do you have help at home or someone to help you manage your care at home? Yes   Who are your caregiver(s) and their phone number(s)? (Daughter) Ivanna [phone], Jovi Gurrolaeliezer (Grandchild)  [phone]   Prior to hospitilization cognitive status: Unable to Assess   Current cognitive status: Alert/Oriented   Walking or Climbing Stairs Difficulty yes   Walking or Climbing Stairs ambulation difficulty, requires equipment   Mobility Management rollator, cane   Equipment Currently Used at Home cane, straight;rollator   Readmission within 30 days? No   Patient currently being followed by outpatient case management? No   Do you currently have service(s) that help you manage your care at home? No   Do you take prescription medications? Yes   Do you have prescription coverage? Yes   Coverage [email or web address]   Do you have any problems affording any of your prescribed medications? No   Is the patient taking medications as prescribed? yes   Who is going to help you get home at discharge? family   How do you get to doctors appointments? family or friend will provide   Are you on dialysis? No   Do you take coumadin? No   Discharge Plan A Home with family   DME Needed Upon Discharge  none   Physical Activity   On average, how many days per week do you engage in moderate to strenuous exercise (like a brisk walk)? 0 days   On average, how many minutes do you engage in exercise at this level? 0 min   Financial Resource Strain   How hard is it for you to pay for the very basics like food, housing, medical care, and  heating? Not very   Housing Stability   In the last 12 months, was there a time when you were not able to pay the mortgage or rent on time? N   At any time in the past 12 months, were you homeless or living in a shelter (including now)? N   Transportation Needs   In the past 12 months, has lack of transportation kept you from medical appointments or from getting medications? no   In the past 12 months, has lack of transportation kept you from meetings, work, or from getting things needed for daily living? No   Food Insecurity   Within the past 12 months, you worried that your food would run out before you got the money to buy more. Never true   Within the past 12 months, the food you bought just didn't last and you didn't have money to get more. Never true   Stress   Do you feel stress - tense, restless, nervous, or anxious, or unable to sleep at night because your mind is troubled all the time - these days? Not at all   Alcohol Use   Q1: How often do you have a drink containing alcohol? Never   Q2: How many drinks containing alcohol do you have on a typical day when you are drinking? None   Q3: How often do you have six or more drinks on one occasion? Never

## 2024-05-03 NOTE — PROGRESS NOTES
[facility] Medicine Wards   Progress Note     Resident Team: [facility] Medicine List 1  Attending Physician: Paulo Goodson MD  Resident: Chandler Mendieta DO   Intern: Chapin Webb DO   Date of Admit: 5/1/2024    Subjective:      Brief HPI:  Charlotte Choi is a 78 y.o. female with a history of hypertension, diabetes, hyperlipidemia who presented to [facility] ED on 5/1/2024  with complaint of Falls and weakness ongoing since Sunday.  Patient states that on Sunday she began to feel weakness with malaise, did not leave her bed.  Patient has experienced poor p.o. intake during this time.  She states that since then she is lost track of time and remained in bed.  Yesterday patient attempted to go to the bathroom and experienced a fall, after which she remained on the floor until today.  She experienced another fall again today.  Patient denies any lightheadedness prior to these falls and denies any loss of consciousness.  She denies any head trauma.  She endorses watery diarrhea approximately twice a day without blood, shortness and breath at rest, dry cough, dysuria, fever and chills that began yesterday.  She denies any nausea, vomiting, headache, chest pain, seizures or postictal state.  Patient's daughter at bedside endorses that the mother did lose bladder control today.     ED:  Patient febrile with a T-max of 101.7°, tachypneic 28, hypertensive 163/89, normoxic on RA.  CBC reveals a leukocytosis of 19.2 with a left shift.  CMP notable for hyponatremia 133, elevated serum creatinine at 1.32 (baseline 0.8), hyperglycemia 156, hypercalcemia 10.4.  BNP and troponin both negative.  Lactic acid within normal limits.  Flu, COVID, RSV negative.  UA unremarkable.  Chest x-ray shows changes of pulmonary vascular congestion and cardiac compensation, confluent opacities in the right infrahilar region superimposed infiltrate can not be completely excluded.  Patient was given 1 L normal saline and 1 g ceftriaxone in ED. she was admitted to  internal medicine for management of sepsis due to community-acquired pneumonia.    Interval History:  Patient afebrile overnight.  Continues to experience dizziness upon standing.  Patient encouraged to continue working with PT OT.      Review of Systems:  12 point review of symptoms negative unless otherwise stated above       Objective:     Vital Signs (Most Recent):  Temp: 98.5 °F (36.9 °C) (05/03/24 1137)  Pulse: 66 (05/03/24 1137)  Resp: 18 (05/03/24 1137)  BP: 118/65 (05/03/24 1137)  SpO2: (!) 92 % (05/03/24 1137) Vital Signs (24h Range):  Temp:  [98.2 °F (36.8 °C)-99 °F (37.2 °C)] 98.5 °F (36.9 °C)  Pulse:  [66-79] 66  Resp:  [18] 18  SpO2:  [92 %-97 %] 92 %  BP: (102-127)/(65-80) 118/65      Physical Exam:  GEN: A&Ox4. No acute distress   HEENT: normocephalic, atraumatic. PERRLA. EMOI bilaterally. Sclera, conjunctiva clear. Nares patents. Oropharyngeal mucosa moist, non-erythematous, non-edematous, uvula midline. Neck supple without LAD   CARDIAC: S1 S2, no MRG. Radial pulses full, no LE edema   RESPI: Chest wall rise symmetric, atraumatic. Lungs CTAB, no wheezing or rhonchi   ABDOMEN: soft, nontender, BS present in all 4 quadrants. No herniation, masses, HSM  : deferred   MSK: ROM grossly intact.   NEURO: Motor and sensation grossly intact. CN grossly intact. No cerebellar deficits noted. No gait abnormalities noted.   PSYCH: Memory and thought process appear improved relative to admission. Appropriate mood and affect. No AI/HI. No HI/SI .       Laboratory    CBC  Recent Labs   Lab 05/01/24  0944 05/02/24  0452 05/03/24  0413   WBC 19.21* 17.91* 13.79*   RBC 5.31 4.83 4.61   HGB 14.5 13.2 12.4   HCT 44.0 39.8 37.9   MCV 82.9 82.4 82.2   MCH 27.3 27.3 26.9*   MCHC 33.0 33.2 32.7*   RDW 13.1 13.1 13.1    243 265   MPV 11.6* 11.6* 11.5*       CMP   Recent Labs   Lab 05/01/24  0944 05/02/24  0452 05/03/24  0413   * 136 136   K 3.8 3.4* 3.2*   CHLORIDE 98 100 101   CO2 23 24 23   BUN 15.9 22.7*  18.5   CREATININE 1.32* 1.27* 0.92   CALCIUM 10.4* 9.7 9.6   MG  --  2.10 2.20   ALBUMIN 3.7 3.1* 2.7*   ALKPHOS 60 54 64   ALT 20 23 25   AST 25 28 30   BILITOT 0.8 0.7 0.4        Microbiology:  Microbiology Results (last 7 days)       Procedure Component Value Units Date/Time    Blood culture x two cultures. Draw prior to antibiotics. [[id number]]  (Normal) Collected: 05/01/24 0943    Order Status: Completed Specimen: Blood from Antecubital, Left Updated: 05/02/24 1500     CULTURE, BLOOD ([facility]) No Growth At 24 Hours    Blood culture x two cultures. Draw prior to antibiotics. [[id number]]  (Normal) Collected: 05/01/24 0944    Order Status: Completed Specimen: Blood Updated: 05/02/24 1500     CULTURE, BLOOD ([facility]) No Growth At 24 Hours    Clostridium Diff Toxin, A & B, EIA [[id number]]     Order Status: Canceled Specimen: Stool     Respiratory Culture [[id number]]     Order Status: Sent Specimen: Sputum             Cardiac Data:  No echocardiogram results found for the past 14 days.    Results for orders placed or performed during the hospital encounter of 05/01/24   EKG 12-lead    Collection Time: 05/01/24  9:41 AM   Result Value Ref Range    QRS Duration 88 ms    [facility] QTC Calculation 425 ms    Narrative    Test Reason : A41.9,    Vent. Rate : 106 BPM     Atrial Rate : 106 BPM     P-R Int : 138 ms          QRS Dur : 088 ms      QT Int : 320 ms       P-R-T Axes : 054 037 044 degrees     QTc Int : 425 ms    Sinus tachycardia  Possible Left atrial enlargement  Borderline Abnormal ECG  When compared with ECG of 16-JAN-2023 18:45,  No significant change was found  Confirmed by Gretta Fiore MD ([id number]) on 5/1/2024 6:04:44 PM    Referred By:             Confirmed By:Gretta Fiore MD        Radiology:  Imaging Results              X-Ray Chest AP Portable (Final result)  Result time 05/01/24 10:20:46      Final result by Trever Gonzalez MD (05/01/24 10:20:46)                   Impression:      Changes of pulmonary vascular  congestion and cardiac compensation.    Confluent opacities in the right infrahilar region superimposed infiltrate can not be completely excluded      Electronically signed by: Trever Gonzalez  Date:    05/01/2024  Time:    10:20               Narrative:    EXAMINATION:  XR CHEST AP PORTABLE    CLINICAL HISTORY:  Sepsis;    TECHNIQUE:  Single frontal view of the chest was performed.    COMPARISON:  None    FINDINGS:  Mediastinal silhouette is within normal limits cardiac silhouette is not enlarged there is increase in interstitial and pulmonary vascular markings indicating some degree of pulmonary vascular congestion and cardiac decompensation.    More confluent opacities identified in the right infrahilar region superimposed infiltrate can not be completely excluded.    No other focal consolidative changes                        Wet Read by Valeriano Ellington DO (05/01/24 10:18:41, [facility], Emergency Medicine)    Increased interstitial markings.                                     X-Ray Knee 3 View Right (Final result)  Result time 05/01/24 11:01:44      Final result by Trever Gonzalez MD (05/01/24 11:01:44)                   Impression:      Tricompartmental degenerative changes.      Electronically signed by: Trever Gonzalez  Date:    05/01/2024  Time:    11:01               Narrative:    EXAMINATION:  XR KNEE 3 VIEW RIGHT    CLINICAL HISTORY:  Unspecified fall, initial encounter    COMPARISON:  None.    FINDINGS:  No acute displaced fractures or dislocations.    There is narrowing of the medial compartment of the knee joint with presence of marginal osteophytes marginal osteophytes seen of the lateral compartment with some degenerative changes of the patellofemoral joint articular spaces otherwise preserved with smooth articular surfaces    No blastic or lytic lesions.    Soft tissues within normal limits.                                      Current Medications:      Infusions:  Current Facility-Administered Medications   Medication Dose Route Frequency Last Rate Last Admin        Scheduled:  Current Facility-Administered Medications   Medication Dose Route Frequency    amLODIPine  10 mg Oral Daily    atorvastatin  40 mg Oral Daily    azithromycin  500 mg Intravenous Q24H    cefTRIAXone (Rocephin) IV (PEDS and ADULTS)  2 g Intravenous Q24H    DULoxetine  20 mg Oral Daily    gabapentin  300 mg Oral TID    heparin (porcine)  5,000 Units Subcutaneous Q12H    potassium bicarbonate  25 mEq Oral BID        PRN:    Current Facility-Administered Medications:     acetaminophen, 650 mg, Oral, Q6H PRN    albuterol, 2 puff, Inhalation, Q6H PRN    dextrose 10%, 12.5 g, Intravenous, PRN    dextrose 10%, 25 g, Intravenous, PRN    glucagon (human recombinant), 1 mg, Intramuscular, PRN    glucose, 16 g, Oral, PRN    glucose, 24 g, Oral, PRN    insulin aspart U-100, 0-10 Units, Subcutaneous, QID (AC + HS) PRN    melatonin, 6 mg, Oral, Nightly PRN    ondansetron, 4 mg, Intravenous, Q8H PRN    sodium chloride 0.9%, 10 mL, Intravenous, PRN    Antibiotics and Day Number of Therapy:  Azithro and Rocephin day 2        Intake/Output Summary (Last 24 hours) at 5/3/2024 1406  Last data filed at 5/2/2024 1450  Gross per 24 hour   Intake 240 ml   Output --   Net 240 ml       Lines/Drains/Airways       Peripheral Intravenous Line  Duration                  Peripheral IV - Single Lumen 05/01/24 0926 20 G Right Antecubital 2 days         Peripheral IV - Single Lumen 05/01/24 0934 22 G Posterior;Right Hand 2 days                      Assessment & Plan:     Sepsis 2/2 CAP   Poor PO Intake   Weakness and Falls 2/2 Above   - SIRS 3/4 - WBC, Temp, Tachypnea   - 1L NS, 1g Rocephin given in ED   - CXR above   - Azithro and Rocephin 2g  - Proair q6 PRN, APAP for fever   - DuoNebs, incentive spirometry  - Respi panel neg.   - F/u RCX, legionella urine Ag, BCX   - Improving clinically, will CTM labs      IVORY - Resolved    - Admission Cr 1.32 (BL 0.8)   - Holding home ACE   - Avoid nephrotoxins  - CTM and rehydrate appropriately      T2DM - Stable   - SSI   - Holding Metformin   - CTM with POC     HTN - stable   - Amlodipine 10   - Holding lisinopril and Toprolol            CODE STATUS:  Full  Access:  PIV x2  Antibiotics:  Azithro and Rocephin day 3  Diet:  Diabetic   DVT Prophylaxis:  Hep 5000  GI Prophylaxis:  None  Fluids:  None      Dispo:  78-year-old female admitted to Internal Medicine for sepsis due to CAP    Chapin Webb DO  Internal Medicine - PGY-1

## 2024-05-03 NOTE — PT/OT/SLP PROGRESS
Physical Therapy Treatment    Patient Name:  Charlotte Choi   MRN:  [id number]    Recommendations     Therapy Intensity Recommendations at Discharge: Low Intensity Therapy  Discharge Equipment Recommendations: walker, rolling, shower chair   Barriers to discharge: fall risk, level of skilled assistance required, and decreased safety awareness    Assessment     Charlotte Choi is a 78 y.o. female admitted with a medical diagnosis of Sepsis due to pneumonia.    She presents with the following impairments/functional limitations:  weakness, impaired endurance, decreased lower extremity function, gait instability, impaired balance.    Rehab Prognosis: Good.    Patient would benefit from continued skilled acute PT services to: address above listed impairments/functional limitations; receive patient/caregiver education; reduce fall risk; and maximize independency/safety with functional mobility.    Recent Surgery: * No surgery found *      Plan     During this hospitalization, patient to be seen 3 x/week to address the identified impairments/functional limitations via gait training, therapeutic activities, therapeutic exercises, neuromuscular re-education and progress toward the established goals.    Plan of Care Expires:   (Discharge)    Subjective     Pt's RN not available at start of tx session.    Patient agreeable to participate in treatment session.    Chief Complaint: Pt needs to have BM  Patient/Family Comments/goals: None stated   Pain/Comfort:  Pain Rating 1: 0/10  Pain Rating Post-Intervention 1: 0/10    Objective     Patient found  sitting on commode in bathroom with IV pole plugged into wall and in bathroom with patient   with peripheral IV  upon PT entry to room.    General Precautions: Standard, fall   Orthopedic Precautions:N/A   Braces:    Respiratory Status: room air    Functional Mobility:    Bed Mobility:  Not assessed     Transfers:  Sit to Stand: contact guard assistance with rolling  walker    Gait:  Patient ambulated 100ft with rolling walker and contact guard assistance.  Patient demonstrates :       occasional unsteady gait. Decreased stance time LLE.  Increased use of RW to offload LLE.  Mod cues to remain in RW. Two turns performed with RW.      Other Mobility:  not assessed    Patient left sitting edge of bed with call button in reach and RN on floor notified of patient complaint of IV site discomfort. .    Goals     Multidisciplinary Problems       Physical Therapy Goals          Problem: Physical Therapy    Goal Priority Disciplines Outcome Goal Variances Interventions   Physical Therapy Goal     PT, PT/OT Progressing     Description: Goals to be met by: D/C     Patient will increase functional independence with mobility by performin. Supine to sit with Modified [place]  2. Sit to stand transfer with Modified [place]  3. Gait  x 200 feet with Modified [place] using Rolling Walker.                        Time Tracking     PT Received On: 24  PT Start Time: 1615     PT Stop Time: 1635  PT Total Time (min): 20 min     Billable Minutes: Gait Training 20 mins     2024

## 2024-05-03 NOTE — PT/OT/SLP EVAL
"Occupational Therapy  Evaluation    Name: Charlotte Choi  MRN: [id number]  ED due to : falls, weakness  Admitting Diagnosis: sepsis 2/2 CAP, R middle lobe PNA, poor po intake, IVORY, weakness/falls   Recent Surgery: * No surgery found *      Recommendations:     Discharge therapy intensity: Low Intensity Therapy   Discharge Equipment Recommendations:  shower chair, walker, rolling  Barriers to discharge:  None    Assessment:     Charlotte Choi is a 78 y.o. female with a medical diagnosis of sepsis 2/2 CAP, R middle lobe PNA, poor po intake, IVORY, weakness/falls .  She presents with the following performance deficits affecting function: weakness, impaired endurance, impaired self care skills, impaired functional mobility, gait instability, impaired balance, pain.     Rehab Prognosis: Good; patient would benefit from acute skilled OT services to address these deficits and reach maximum level of function.       Plan:     Patient to be seen 3 x/week to address the above listed problems via self-care/home management, therapeutic activities, therapeutic exercises  Plan of Care Expires:    Plan of Care Reviewed with: patient, son    Subjective     Chief Complaint: c/o pain in L thigh ; c/o dizziness after t/f supine>sit subsided ; "my breathing not good"  Patient/Family Comments/goals: to get stronger and return home    Occupational Profile:  Living Environment: resides at home with son, ramp entrance, tub/shower combo  Previous level of function: pt reports I with ADL tasks, ambulatory with rollator, sits on the side of the tub for bathing  Roles and Routines:   Equipment Used at Home: cane, straight (rollator)  Assistance upon Discharge: son and daughter    Pain/Comfort:  Pain Rating 1:  (no pain at rest, pt c/o pain in L thigh with standing trials)  Pain Addressed 1: Reposition    Patients cultural, spiritual, Moravian conflicts given the current situation:      Objective:     OT communicated with nurse prior to " session.      Patient was found supine with peripheral IV upon OT entry to room.    General Precautions: Standard, fall  Orthopedic Precautions:    Braces:      Vital Signs: Blood Pressure: 138/94  HR: 77  Sp02: 93-96%    Bed Mobility:    Patient completed Rolling/Turning to Left with  modified independence  Patient completed Scooting/Bridging with modified independence  Patient completed Supine to Sit with modified independence    Functional Mobility/Transfers:  Patient completed Sit <> Stand Transfer with contact guard assistance  with  rolling walker   Patient completed Toilet Transfer Step Transfer technique with minimum assistance with  rolling walker  Functional Mobility: with initial sit>stand pt c/o pain in L thigh buckled with attempts to take a step, completed pre gait therex standing at edge of bed, pt then able to take steps to t/f to BSC without L LE buckling; ambulated 15 ft in room with RW CGA    Activities of Daily Living:  Lower Body Dressing: pt able to doff/marlen B socks, threaded B LE into pillowcase for simulated lower body dressing tasks mod I        Functional Cognition:  Intact    Upper Extremity Function:  Right Upper Extremity:   WFL    Left Upper Extremity:  WFL    Balance:   Static sitting balance: WFL  Dynamic sitting balance:WFL  Static standing balance:CGA  Dynamic standing balance:min A due to episodes of LLE buckling with mobility    Patient Education:  Patient provided with verbal education education regarding OT role/goals/POC, fall prevention, and Discharge/DME recommendations.  Understanding was verbalized.     Patient left sitting edge of bed eating lunch with all lines intact, call button in reach, and son present.    GOALS:   Multidisciplinary Problems       Occupational Therapy Goals          Problem: Occupational Therapy    Goal Priority Disciplines Outcome Interventions   Occupational Therapy Goal     OT, PT/OT Progressing    Description: Pt will ambulate to bathroom with RW  mod I  Pt will complete toilet t/f mod I  Pt will complete toileting taks mod I  Pt will complete grooming in standing at sink                       History:     Past Medical History:   Diagnosis Date    Diabetes mellitus     Hypertension          Past Surgical History:   Procedure Laterality Date    HYSTERECTOMY      MAGNETIC RESONANCE IMAGING N/A 3/15/2024    Procedure: MRI (Magnetic Resonance Imagine);  Surgeon: Susanne Ellington MD;  [facility]: [facility];  Service: Anesthesiology;  Laterality: N/A;  MRI BRAIN W W/O CONTRAST WITH ANESTHESIA       Time Tracking:     OT Date of Treatment:    OT Start Time: 1211  OT Stop Time: 1238  OT Total Time (min): 27 min    Billable Minutes:Evaluation mod comp    5/3/2024

## 2024-05-03 NOTE — CONSULTS
Referral submitted to [facility] via [facility].    Referral submitted to [employer] for compression stockings, they are not covered by pt's insurance, out of pocket cost starts at $40.

## 2024-05-03 NOTE — PROGRESS NOTES
[facility] unable to accept, referral submitted to Mony Segura via [facility]. Will follow.

## 2024-05-04 VITALS
WEIGHT: 194 LBS | DIASTOLIC BLOOD PRESSURE: 81 MMHG | TEMPERATURE: 99 F | SYSTOLIC BLOOD PRESSURE: 155 MMHG | OXYGEN SATURATION: 92 % | HEIGHT: 65 IN | RESPIRATION RATE: 18 BRPM | BODY MASS INDEX: 32.32 KG/M2 | HEART RATE: 71 BPM

## 2024-05-04 LAB
ALBUMIN SERPL-MCNC: 2.7 G/DL (ref 3.4–4.8)
ALBUMIN/GLOB SERPL: 0.6 RATIO (ref 1.1–2)
ALP SERPL-CCNC: 53 UNIT/L (ref 40–150)
ALT SERPL-CCNC: 41 UNIT/L (ref 0–55)
AST SERPL-CCNC: 37 UNIT/L (ref 5–34)
BASOPHILS # BLD AUTO: 0.07 X10(3)/MCL
BASOPHILS NFR BLD AUTO: 0.7 %
BILIRUB SERPL-MCNC: 0.3 MG/DL
BUN SERPL-MCNC: 16.7 MG/DL (ref 9.8–20.1)
CALCIUM SERPL-MCNC: 9.8 MG/DL (ref 8.4–10.2)
CHLORIDE SERPL-SCNC: 102 MMOL/L (ref 98–107)
CO2 SERPL-SCNC: 29 MMOL/L (ref 23–31)
CREAT SERPL-MCNC: 0.85 MG/DL (ref 0.55–1.02)
EOSINOPHIL # BLD AUTO: 0.14 X10(3)/MCL (ref 0–0.9)
EOSINOPHIL NFR BLD AUTO: 1.4 %
ERYTHROCYTE [DISTWIDTH] IN BLOOD BY AUTOMATED COUNT: 12.9 % (ref 11.5–17)
GFR SERPLBLD CREATININE-BSD FMLA CKD-EPI: >60 MLS/MIN/1.73/M2
GLOBULIN SER-MCNC: 4.8 GM/DL (ref 2.4–3.5)
GLUCOSE SERPL-MCNC: 162 MG/DL (ref 82–115)
HCT VFR BLD AUTO: 38.7 % (ref 37–47)
HGB BLD-MCNC: 12.6 G/DL (ref 12–16)
IMM GRANULOCYTES # BLD AUTO: 0.08 X10(3)/MCL (ref 0–0.04)
IMM GRANULOCYTES NFR BLD AUTO: 0.8 %
LYMPHOCYTES # BLD AUTO: 2.37 X10(3)/MCL (ref 0.6–4.6)
LYMPHOCYTES NFR BLD AUTO: 24.2 %
MAGNESIUM SERPL-MCNC: 2.2 MG/DL (ref 1.6–2.6)
MCH RBC QN AUTO: 27.1 PG (ref 27–31)
MCHC RBC AUTO-ENTMCNC: 32.6 G/DL (ref 33–36)
MCV RBC AUTO: 83.2 FL (ref 80–94)
MONOCYTES # BLD AUTO: 0.87 X10(3)/MCL (ref 0.1–1.3)
MONOCYTES NFR BLD AUTO: 8.9 %
NEUTROPHILS # BLD AUTO: 6.25 X10(3)/MCL (ref 2.1–9.2)
NEUTROPHILS NFR BLD AUTO: 64 %
NRBC BLD AUTO-RTO: 0 %
PHOSPHATE SERPL-MCNC: 2.2 MG/DL (ref 2.3–4.7)
PLATELET # BLD AUTO: 302 X10(3)/MCL (ref 130–400)
PMV BLD AUTO: 11 FL (ref 7.4–10.4)
POCT GLUCOSE: 113 MG/DL (ref 70–110)
POTASSIUM SERPL-SCNC: 3.4 MMOL/L (ref 3.5–5.1)
PROT SERPL-MCNC: 7.5 GM/DL (ref 5.8–7.6)
RBC # BLD AUTO: 4.65 X10(6)/MCL (ref 4.2–5.4)
SODIUM SERPL-SCNC: 139 MMOL/L (ref 136–145)
WBC # SPEC AUTO: 9.78 X10(3)/MCL (ref 4.5–11.5)

## 2024-05-04 PROCEDURE — 80053 COMPREHEN METABOLIC PANEL: CPT | Performed by: STUDENT IN AN ORGANIZED HEALTH CARE EDUCATION/TRAINING PROGRAM

## 2024-05-04 PROCEDURE — 83735 ASSAY OF MAGNESIUM: CPT

## 2024-05-04 PROCEDURE — 25000003 PHARM REV CODE 250: Performed by: STUDENT IN AN ORGANIZED HEALTH CARE EDUCATION/TRAINING PROGRAM

## 2024-05-04 PROCEDURE — 36415 COLL VENOUS BLD VENIPUNCTURE: CPT | Performed by: STUDENT IN AN ORGANIZED HEALTH CARE EDUCATION/TRAINING PROGRAM

## 2024-05-04 PROCEDURE — 94761 N-INVAS EAR/PLS OXIMETRY MLT: CPT

## 2024-05-04 PROCEDURE — 63600175 PHARM REV CODE 636 W HCPCS: Performed by: STUDENT IN AN ORGANIZED HEALTH CARE EDUCATION/TRAINING PROGRAM

## 2024-05-04 PROCEDURE — 84100 ASSAY OF PHOSPHORUS: CPT

## 2024-05-04 PROCEDURE — 85025 COMPLETE CBC W/AUTO DIFF WBC: CPT | Performed by: STUDENT IN AN ORGANIZED HEALTH CARE EDUCATION/TRAINING PROGRAM

## 2024-05-04 RX ORDER — AMOXICILLIN AND CLAVULANATE POTASSIUM 875; 125 MG/1; MG/1
1 TABLET, FILM COATED ORAL 2 TIMES DAILY
Qty: 10 TABLET | Refills: 0 | Status: SHIPPED | OUTPATIENT
Start: 2024-05-04 | End: 2024-05-09

## 2024-05-04 RX ORDER — AZITHROMYCIN 250 MG/1
250 TABLET, FILM COATED ORAL DAILY
Qty: 5 TABLET | Refills: 0 | Status: SHIPPED | OUTPATIENT
Start: 2024-05-04 | End: 2024-05-09

## 2024-05-04 RX ADMIN — AMLODIPINE BESYLATE 10 MG: 10 TABLET ORAL at 08:05

## 2024-05-04 RX ADMIN — ATORVASTATIN CALCIUM 40 MG: 40 TABLET, FILM COATED ORAL at 08:05

## 2024-05-04 RX ADMIN — HEPARIN SODIUM 5000 UNITS: 5000 INJECTION, SOLUTION INTRAVENOUS; SUBCUTANEOUS at 08:05

## 2024-05-04 RX ADMIN — DULOXETINE HYDROCHLORIDE 20 MG: 20 CAPSULE, DELAYED RELEASE ORAL at 08:05

## 2024-05-04 RX ADMIN — GABAPENTIN 300 MG: 300 CAPSULE ORAL at 08:05

## 2024-05-04 NOTE — DISCHARGE SUMMARY
[facility] Internal Medicine Discharge Summary    Admitting Physician: Paulo Goodson MD  Attending Physician: Paulo Goodson MD  Date of Admit: 5/1/2024  Date of Discharge: 5/4/2024    Condition: Good  Outcome: Condition has improved and patient is now ready for discharge.  DISPOSITION: Home or Self Care    Discharge Diagnoses     Patient Active Problem List   Diagnosis    Hypertension    Spinal stenosis of lumbar region    Type 2 diabetes mellitus    Hyperlipidemia    IVORY (acute kidney injury)    Sepsis due to pneumonia    Moderate malnutrition       Principal Problem:  Sepsis due to pneumonia    Consultants and Procedures     Consultants:  Consults (From admission, onward)          Status Ordering Provider     Inpatient consult to Social Work/Case Management  Once        Provider:  (Not yet assigned)    Completed SAM PEOPLES     Inpatient consult to Social Work/Case Management  Once        Provider:  (Not yet assigned)    Completed SAM PEOPLES     Inpatient consult to Hospitalist  Once        Provider:  Genny Jefferson MD Acknowledged BROUSSARD, IAN             Procedures:   * No surgery found *     Brief History of Present Illness      Charlotte Choi is a 78 y.o. female with a history of hypertension, diabetes, hyperlipidemia who presented to [facility] ED on 5/1/2024  with complaint of Falls and weakness ongoing since Sunday.  Patient states that on Sunday she began to feel weakness with malaise, did not leave her bed.  Patient has experienced poor p.o. intake during this time.  She states that since then she is lost track of time and remained in bed.  Yesterday patient attempted to go to the bathroom and experienced a fall, after which she remained on the floor until today.  She experienced another fall again today.  Patient denies any lightheadedness prior to these falls and denies any loss of consciousness.  She denies any head trauma.  She endorses watery diarrhea approximately twice a day without  blood, shortness and breath at rest, dry cough, dysuria, fever and chills that began yesterday.  She denies any nausea, vomiting, headache, chest pain, seizures or postictal state.  Patient's daughter at bedside endorses that the mother did lose bladder control today.     ED:  Patient febrile with a T-max of 101.7°, tachypneic 28, hypertensive 163/89, normoxic on RA.  CBC reveals a leukocytosis of 19.2 with a left shift.  CMP notable for hyponatremia 133, elevated serum creatinine at 1.32 (baseline 0.8), hyperglycemia 156, hypercalcemia 10.4.  BNP and troponin both negative.  Lactic acid within normal limits.  Flu, COVID, RSV negative.  UA unremarkable.  Chest x-ray shows changes of pulmonary vascular congestion and cardiac compensation, confluent opacities in the right infrahilar region superimposed infiltrate can not be completely excluded.  Patient was given 1 L normal saline and 1 g ceftriaxone in ED. she was admitted to internal medicine for management of sepsis due to community-acquired pneumonia.    Hospital Course with Pertinent Findings     Patient improved clinically IV and antibiotics.  She continued to experience dizziness the day prior to discharge, prompting us to continue her care for an additional day. Lung sounds improved at discharge. Will discharge her with a 5 day course Azithromycin 250 mg qD and Augmentin 875 mg BID. Patient is currently stable for discharge.     Lab Results   Component Value Date     05/04/2024    K 3.4 (L) 05/04/2024    CO2 29 05/04/2024    BUN 16.7 05/04/2024    CREATININE 0.85 05/04/2024    CALCIUM 9.8 05/04/2024    ALKPHOS 53 05/04/2024    AST 37 (H) 05/04/2024    ALT 41 05/04/2024    MG 2.20 05/04/2024    PHOS 2.2 (L) 05/04/2024        Lab Results   Component Value Date    WBC 9.78 05/04/2024    RBC 4.65 05/04/2024    HGB 12.6 05/04/2024    HCT 38.7 05/04/2024    MCV 83.2 05/04/2024    MCH 27.1 05/04/2024    MCHC 32.6 (L) 05/04/2024    RDW 12.9 05/04/2024      05/04/2024    MPV 11.0 (H) 05/04/2024         Microbiology Data:  Microbiology Results (last 7 days)       Procedure Component Value Units Date/Time    Blood culture x two cultures. Draw prior to antibiotics. [[id number]]  (Normal) Collected: 05/01/24 0943    Order Status: Completed Specimen: Blood from Antecubital, Left Updated: 05/03/24 1500     CULTURE, BLOOD ([facility]) No Growth At 48 Hours    Blood culture x two cultures. Draw prior to antibiotics. [[id number]]  (Normal) Collected: 05/01/24 0944    Order Status: Completed Specimen: Blood Updated: 05/03/24 1500     CULTURE, BLOOD ([facility]) No Growth At 48 Hours    Clostridium Diff Toxin, A & B, EIA [[id number]]     Order Status: Canceled Specimen: Stool     Respiratory Culture [[id number]]     Order Status: Sent Specimen: Sputum             Cardiac Data:  No echocardiogram results found for the past 14 days.    Results for orders placed or performed during the hospital encounter of 05/01/24   EKG 12-lead    Collection Time: 05/01/24  9:41 AM   Result Value Ref Range    QRS Duration 88 ms    [facility] QTC Calculation 425 ms    Narrative    Test Reason : A41.9,    Vent. Rate : 106 BPM     Atrial Rate : 106 BPM     P-R Int : 138 ms          QRS Dur : 088 ms      QT Int : 320 ms       P-R-T Axes : 054 037 044 degrees     QTc Int : 425 ms    Sinus tachycardia  Possible Left atrial enlargement  Borderline Abnormal ECG  When compared with ECG of 16-JAN-2023 18:45,  No significant change was found  Confirmed by Gretta Fiore MD ([id number]) on 5/1/2024 6:04:44 PM    Referred By:             Confirmed By:Gretta Fiore MD        Radiology:  Imaging Results              X-Ray Chest AP Portable (Final result)  Result time 05/01/24 10:20:46      Final result by Trever Gonzalez MD (05/01/24 10:20:46)                   Impression:      Changes of pulmonary vascular congestion and cardiac compensation.    Confluent opacities in the right infrahilar region superimposed infiltrate can not be  completely excluded      Electronically signed by: Trever Gonzalez  Date:    05/01/2024  Time:    10:20               Narrative:    EXAMINATION:  XR CHEST AP PORTABLE    CLINICAL HISTORY:  Sepsis;    TECHNIQUE:  Single frontal view of the chest was performed.    COMPARISON:  None    FINDINGS:  Mediastinal silhouette is within normal limits cardiac silhouette is not enlarged there is increase in interstitial and pulmonary vascular markings indicating some degree of pulmonary vascular congestion and cardiac decompensation.    More confluent opacities identified in the right infrahilar region superimposed infiltrate can not be completely excluded.    No other focal consolidative changes                        Wet Read by Valeriano Ellington DO (05/01/24 10:18:41, [facility], Emergency Medicine)    Increased interstitial markings.                                     X-Ray Knee 3 View Right (Final result)  Result time 05/01/24 11:01:44      Final result by Trever Gonzalez MD (05/01/24 11:01:44)                   Impression:      Tricompartmental degenerative changes.      Electronically signed by: Trever Gonzalez  Date:    05/01/2024  Time:    11:01               Narrative:    EXAMINATION:  XR KNEE 3 VIEW RIGHT    CLINICAL HISTORY:  Unspecified fall, initial encounter    COMPARISON:  None.    FINDINGS:  No acute displaced fractures or dislocations.    There is narrowing of the medial compartment of the knee joint with presence of marginal osteophytes marginal osteophytes seen of the lateral compartment with some degenerative changes of the patellofemoral joint articular spaces otherwise preserved with smooth articular surfaces    No blastic or lytic lesions.    Soft tissues within normal limits.                                         Discharge physical exam:  Vitals:    05/04/24 0715   BP: (!) 155/81   Pulse: 71   Resp: 18   Temp: 98.6 °F (37 °C)       GEN: A&Ox4. No acute distress   HEENT:  normocephalic, atraumatic. PERRLA. EMOI bilaterally. Sclera, conjunctiva clear. Nares patents. Oropharyngeal mucosa moist, non-erythematous, non-edematous, uvula midline. Neck supple without LAD   CARDIAC: S1 S2, no MRG. Radial pulses full, no LE edema   RESPI: Chest wall rise symmetric, atraumatic. Lungs CTAB, no wheezing or rhonchi   ABDOMEN: soft, nontender, BS present in all 4 quadrants. No herniation, masses, HSM  : deferred   MSK: ROM grossly intact.   NEURO: Motor and sensation grossly intact. CN grossly intact. No cerebellar deficits noted. No gait abnormalities noted.   PSYCH: Memory and thought process appear intact. Appropriate mood and affect. No AI/HI. No HI/SI .       TIME SPENT ON DISCHARGE: 60 minutes    Discharge Medications        Medication List        START taking these medications      amoxicillin-clavulanate 875-125mg 875-125 mg per tablet  Commonly known as: AUGMENTIN  Take 1 tablet by mouth 2 (two) times daily. for 5 days     azithromycin 250 MG tablet  Commonly known as: Z-TAN  Take 1 tablet (250 mg total) by mouth once daily. for 5 days            CONTINUE taking these medications      * albuterol 90 mcg/actuation inhaler  Commonly known as: PROVENTIL HFA  Inhale 2 puffs into the lungs every 6 (six) hours as needed for Shortness of Breath. Rescue     * albuterol 90 mcg/actuation inhaler  Commonly known as: PROAIR HFA  Inhale 2 puffs into the lungs every 6 (six) hours as needed for Wheezing. Rescue     amLODIPine 10 MG tablet  Commonly known as: NORVASC  Take 1 tablet (10 mg total) by mouth once daily.     atorvastatin 40 MG tablet  Commonly known as: LIPITOR  Take 1 tablet (40 mg total) by mouth once daily.     DULoxetine 20 MG capsule  Commonly known as: CYMBALTA  Take 1 capsule (20 mg total) by mouth once daily.     fluticasone propionate 50 mcg/actuation nasal spray  Commonly known as: FLONASE  1 spray (50 mcg total) by Each Nostril route once daily.     gabapentin 300 MG  capsule  Commonly known as: NEURONTIN  Take 1 capsule (300 mg total) by mouth 3 (three) times daily.     ibuprofen 600 MG tablet  Commonly known as: ADVIL,MOTRIN     latanoprost 0.005 % ophthalmic solution     lisinopriL 20 MG tablet  Commonly known as: PRINIVIL,ZESTRIL  Take 1 tablet (20 mg total) by mouth once daily.     metFORMIN 500 MG tablet  Commonly known as: GLUCOPHAGE  Take 1 tablet (500 mg total) by mouth 2 (two) times daily with meals.     metoprolol succinate 50 MG 24 hr tablet  Commonly known as: TOPROL-XL  Take 1 tablet (50 mg total) by mouth once daily.     * triamcinolone acetonide 0.1% 0.1 % cream  Commonly known as: KENALOG     * triamcinolone acetonide 0.1% 0.1 % cream  Commonly known as: KENALOG  Apply topically 2 (two) times daily.     triamterene-hydrochlorothiazide 75-50 mg 75-50 mg per tablet  Commonly known as: MAXZIDE  Take 1 tablet by mouth once daily.           * This list has 4 medication(s) that are the same as other medications prescribed for you. Read the directions carefully, and ask your doctor or other care provider to review them with you.                STOP taking these medications      ALPRAZolam 0.25 MG tablet  Commonly known as: XANAX     HYDROcodone-acetaminophen 7.5-325 mg per tablet  Commonly known as: NORCO               Where to Get Your Medications        These medications were sent to [facility]/pharmacy #[id number] [address] AT CORNER OF [address] [address]      Phone: [phone]   amoxicillin-clavulanate 875-125mg 875-125 mg per tablet  azithromycin 250 MG tablet         Discharge Information:     Ms. Charlotte Choi is being discharged Home or Self Care.    Discharge Procedure Orders   Ambulatory referral/consult to Internal Medicine   Standing Status: Future   Referral Priority: Routine Referral Type: Consultation   Referral Reason: Specialty Services Required   Requested Specialty: Internal Medicine   Number of Visits  Requested: 1        Follow-Up Appointments:   Follow-up Information       Pawan Oneil, DO Follow up in 1 week(s).    Specialty: Internal Medicine  Contact information:  [address] [id number] [phone]                             To address at follow-up:  -The following labs are to be drawn at the [facility] visit: CBC BMP     The above information was discussed with the patient in clear terms. She was able to repeat the instructions to me in her own words. All questions answered. ED precautions provided.    Chapin Webb, DO   PGY-1 Internal Medicine    Information to maybe and

## 2024-05-06 LAB
BACTERIA BLD CULT: NORMAL
BACTERIA BLD CULT: NORMAL

## 2024-05-06 NOTE — PT/OT/SLP DISCHARGE
POST DISCHARGE DOCUMENTATION - 2024 3:18 PM    Physical Therapy Discharge Note    Documenting Physical Therapist did not evaluate OR treat the patient.    Evaluating/treating Physical Therapist is not available to complete discharge summary.    Refer to prior Physical Therapy note  for last known functional status of patient.      Name: Charlotte Choi  MRN: [id number]   Principal Problem: Sepsis due to pneumonia     Recommendations: per last treatment session     Therapy Intensity Recommendations at Discharge: Low Intensity Therapy  Discharge Equipment Recommendations: walker, rolling, shower chair     Assessment:     Patient was unexpectedly discharged from hospital.  Refer to therapy's initial evaluation or last treatment note for patient's most recent functional status and goal achievement and therapists' recommendations.    Objective:     GOALS:  Multidisciplinary Problems       Physical Therapy Goals          Problem: Physical Therapy    Goal Priority Disciplines Outcome Goal Variances Interventions   Physical Therapy Goal     PT, PT/OT Progressing     Description: Goals to be met by: D/C     Patient will increase functional independence with mobility by performin. Supine to sit with Modified [place]  2. Sit to stand transfer with Modified [place]  3. Gait  x 200 feet with Modified [place] using Rolling Walker.                        Plan:     Patient Discharged to: home or self care per chart.    2024

## 2024-05-06 NOTE — PT/OT/SLP DISCHARGE
Occupational Therapy Discharge Summary  Evaluating/treating OT unavailable for DC documentation; DC summary completed per chart review.  Charlotte Choi  MRN: [id number]   Principal Problem: Sepsis due to pneumonia      Patient Discharged from acute Occupational Therapy on 5/4/24.  Please refer to prior OT note dated 5/3/24 for functional status.    Assessment:      Goals partially met. Patient appropriate for care in another setting.    Objective:     GOALS:   Multidisciplinary Problems       Occupational Therapy Goals          Problem: Occupational Therapy    Goal Priority Disciplines Outcome Interventions   Occupational Therapy Goal     OT, PT/OT Adequate for Care Transition    Description: Pt will ambulate to bathroom with RW mod I  Pt will complete toilet t/f mod I  Pt will complete toileting taks mod I  Pt will complete grooming in standing at sink                       Reasons for Discontinuation of Therapy Services  Transfer to alternate level of care.      Plan:     Patient Discharged to: Home with Home Health Service    5/6/2024

## 2024-05-07 ENCOUNTER — PATIENT OUTREACH (OUTPATIENT)
Dept: ADMINISTRATIVE | Facility: CLINIC | Age: 79
End: 2024-05-07
Payer: MEDICARE

## 2024-05-07 NOTE — PROGRESS NOTES
C3 nurse attempted to contact Charlotte RADHA Gascamis  for a TCC post hospital discharge follow up call. No answer. Left voicemail with callback information. The patient has a scheduled HOSFU appointment with Akosua Rangel MD (Internal Medicine) on 5/13/24 @ 12:50 PM.

## 2024-05-07 NOTE — PHYSICIAN QUERY
Please provide the diagnosis or diagnoses associated with the clinical findings:    Patient presented altered mental status 2/2 encephalopathy, unspecified

## 2024-05-08 NOTE — PROGRESS NOTES
C3 nurse made second attempt to contact Charlotte Choi for a TCC post hospital discharge follow up call.

## 2024-05-10 NOTE — PROGRESS NOTES
C3 nurse made third attempt to contact Charlotte Choi for a TCC post hospital discharge follow up call.

## 2024-05-13 ENCOUNTER — OFFICE VISIT (OUTPATIENT)
Dept: INTERNAL MEDICINE | Facility: CLINIC | Age: 79
DRG: 392 | End: 2024-05-13
Payer: MEDICARE

## 2024-05-13 VITALS
HEART RATE: 74 BPM | RESPIRATION RATE: 20 BRPM | BODY MASS INDEX: 32.72 KG/M2 | WEIGHT: 196.63 LBS | DIASTOLIC BLOOD PRESSURE: 66 MMHG | TEMPERATURE: 98 F | SYSTOLIC BLOOD PRESSURE: 132 MMHG

## 2024-05-13 DIAGNOSIS — I10 PRIMARY HYPERTENSION: Primary | ICD-10-CM

## 2024-05-13 DIAGNOSIS — J18.9 COMMUNITY ACQUIRED PNEUMONIA OF RIGHT LUNG, UNSPECIFIED PART OF LUNG: ICD-10-CM

## 2024-05-13 PROCEDURE — 99214 OFFICE O/P EST MOD 30 MIN: CPT | Mod: PBBFAC | Performed by: STUDENT IN AN ORGANIZED HEALTH CARE EDUCATION/TRAINING PROGRAM

## 2024-05-13 RX ORDER — TRIAMCINOLONE ACETONIDE 1 MG/G
1 CREAM TOPICAL 2 TIMES DAILY
Qty: 30 G | Refills: 4 | Status: SHIPPED | OUTPATIENT
Start: 2024-05-13

## 2024-05-13 RX ORDER — ALBUTEROL SULFATE 90 UG/1
2 AEROSOL, METERED RESPIRATORY (INHALATION) EVERY 6 HOURS PRN
Qty: 18 G | Refills: 2 | Status: SHIPPED | OUTPATIENT
Start: 2024-05-13

## 2024-05-13 NOTE — PATIENT INSTRUCTIONS
"Hold all blood pressure medications. Check blood pressure 2 times a day.     If "top number" (systolic blood pressure) is less than 100, call your PCP or report to an Urgent Care or Emergency Department.     If "top number" (systolic blood pressure) is greater than 150, start taking your metoprolol again. If the top number remains elevated, continue taking metoprolol, and add amlodipine.   "

## 2024-05-13 NOTE — PROGRESS NOTES
"[facility] INTERNAL MEDICINE  POST-LEIGH VISIT NOTE    SUBJECTIVE:      HPI: Charlotte Choi is a 78 y.o. yo female w/ PMH of f hypertension, diabetes, hyperlipidemia, who presents today for post leigh clinic after recent hospitalization for CAP 5/1/2024 to 5/4/2024. Patient treated with Ceftriaxone and Rocephin inpatient and discharged on a 5-day course of Azithromycin 250 mg daily and Augmentin 875 mg BID.     Today, patient continues to report episodes of dizziness with standing. Less so when she misses BP meds. Orthostatics positive in hospital. Orthostatics negative today.    ROS:  (+) Dizziness (occasional)  (-) Chest pain, palpitations, SOB, fever, night sweats, chills, diarrhea, constipation.       OBJECTIVE:     Vital signs:   /78 (BP Location: Left arm, Patient Position: Sitting, BP Method: Large (Automatic))   Pulse 74   Temp 98.2 °F (36.8 °C) (Oral)   Resp 20   Wt 89.2 kg (196 lb 9.6 oz)   BMI 32.72 kg/m²      Physical Examination:  General: Well nourished w/o distress  HEENT: NC/AT, MMM  Pulm: CTA bilaterally, normal work of breathing  CV: regular rate without murmurs or gallops  MSK: no lower extremity edema, using rollator  Derm: No rashes, abnormal bruising, or skin lesions  Neuro: AAOx4; CN II-XII intact; motor/sensory function intact  Psych: Cooperative; appropriate mood and affect    Significant findings:  XR Chest 5/2/2024: Right middle lung lobe infiltrates       ASSESSMENT & PLAN:     Community acquired pneumonia  - Recent hospitalization for CAP 5/1/2024 to 5/4/2024  - Treated with Ceftriaxone and Rocephin inpatient and discharged on a 5-day course of Azithromycin 250 mg daily and Augmentin 875 mg BID  - Patient completed all antibiotics and denies cough, fever; had occasional SOB and uses rescue inhaler once a day    Hypertension  - /78 in clinic today  - Well controlled but did not take medications today  - Reports "dizzy spells" when standing, less dizzy spells when skipping BP " meds  - Per discussion with Dr. Marques, hold all blood pressure medication and call if SBP<100 or >150, if BP elevated recommend adding BP medications in this order: metoprolol, amlodipine, lisinopril  - Advised to check blood pressure twice a day  - Bring BP log to next visit in 2 weeks with PCP for further adjustments  - Further management per PCP      Disposition: Follow up with PCP for routine visit on 5/29/2024      Akosua Rangel MD  [facility] Internal Medicine, PRG-2  5/13/2024

## 2024-05-15 ENCOUNTER — HOSPITAL ENCOUNTER (INPATIENT)
Facility: HOSPITAL | Age: 79
LOS: 3 days | Discharge: HOME HEALTH | DRG: 392 | End: 2024-05-18
Attending: FAMILY MEDICINE | Admitting: STUDENT IN AN ORGANIZED HEALTH CARE EDUCATION/TRAINING PROGRAM
Payer: MEDICARE

## 2024-05-15 DIAGNOSIS — K57.92 DIVERTICULITIS: ICD-10-CM

## 2024-05-15 DIAGNOSIS — K57.32 DIVERTICULITIS LARGE INTESTINE W/O PERFORATION OR ABSCESS W/O BLEEDING: Primary | ICD-10-CM

## 2024-05-15 LAB
ALBUMIN SERPL-MCNC: 4 G/DL (ref 3.4–4.8)
ALBUMIN/GLOB SERPL: 0.7 RATIO (ref 1.1–2)
ALP SERPL-CCNC: 57 UNIT/L (ref 40–150)
ALT SERPL-CCNC: 36 UNIT/L (ref 0–55)
AST SERPL-CCNC: 27 UNIT/L (ref 5–34)
BACTERIA #/AREA URNS AUTO: ABNORMAL /HPF
BASOPHILS # BLD AUTO: 0.06 X10(3)/MCL
BASOPHILS NFR BLD AUTO: 0.4 %
BILIRUB SERPL-MCNC: 0.4 MG/DL
BILIRUB UR QL STRIP.AUTO: NEGATIVE
BUN SERPL-MCNC: 18.3 MG/DL (ref 9.8–20.1)
CALCIUM SERPL-MCNC: 10.5 MG/DL (ref 8.4–10.2)
CHLORIDE SERPL-SCNC: 102 MMOL/L (ref 98–107)
CLARITY UR: CLEAR
CO2 SERPL-SCNC: 23 MMOL/L (ref 23–31)
COLOR UR AUTO: ABNORMAL
CREAT SERPL-MCNC: 1.09 MG/DL (ref 0.55–1.02)
EOSINOPHIL # BLD AUTO: 0 X10(3)/MCL (ref 0–0.9)
EOSINOPHIL NFR BLD AUTO: 0 %
ERYTHROCYTE [DISTWIDTH] IN BLOOD BY AUTOMATED COUNT: 13.3 % (ref 11.5–17)
GFR SERPLBLD CREATININE-BSD FMLA CKD-EPI: 51 ML/MIN/1.73/M2
GLOBULIN SER-MCNC: 5.5 GM/DL (ref 2.4–3.5)
GLUCOSE SERPL-MCNC: 228 MG/DL (ref 82–115)
GLUCOSE UR QL STRIP: NORMAL
HCT VFR BLD AUTO: 45.3 % (ref 37–47)
HGB BLD-MCNC: 15.1 G/DL (ref 12–16)
HGB UR QL STRIP: NEGATIVE
HYALINE CASTS #/AREA URNS LPF: ABNORMAL /LPF
IMM GRANULOCYTES # BLD AUTO: 0.08 X10(3)/MCL (ref 0–0.04)
IMM GRANULOCYTES NFR BLD AUTO: 0.5 %
KETONES UR QL STRIP: NEGATIVE
LEUKOCYTE ESTERASE UR QL STRIP: NEGATIVE
LIPASE SERPL-CCNC: 29 U/L
LYMPHOCYTES # BLD AUTO: 1.51 X10(3)/MCL (ref 0.6–4.6)
LYMPHOCYTES NFR BLD AUTO: 9.7 %
MAGNESIUM SERPL-MCNC: 2.3 MG/DL (ref 1.6–2.6)
MCH RBC QN AUTO: 27.5 PG (ref 27–31)
MCHC RBC AUTO-ENTMCNC: 33.3 G/DL (ref 33–36)
MCV RBC AUTO: 82.4 FL (ref 80–94)
MONOCYTES # BLD AUTO: 0.38 X10(3)/MCL (ref 0.1–1.3)
MONOCYTES NFR BLD AUTO: 2.4 %
MUCOUS THREADS URNS QL MICRO: ABNORMAL /LPF
NEUTROPHILS # BLD AUTO: 13.51 X10(3)/MCL (ref 2.1–9.2)
NEUTROPHILS NFR BLD AUTO: 87 %
NITRITE UR QL STRIP: NEGATIVE
NRBC BLD AUTO-RTO: 0 %
PH UR STRIP: 6 [PH]
PLATELET # BLD AUTO: 411 X10(3)/MCL (ref 130–400)
PMV BLD AUTO: 10.6 FL (ref 7.4–10.4)
POCT GLUCOSE: 147 MG/DL (ref 70–110)
POCT GLUCOSE: 161 MG/DL (ref 70–110)
POCT GLUCOSE: 192 MG/DL (ref 70–110)
POTASSIUM SERPL-SCNC: 4.8 MMOL/L (ref 3.5–5.1)
PROT SERPL-MCNC: 9.5 GM/DL (ref 5.8–7.6)
PROT UR QL STRIP: NEGATIVE
PTH-INTACT SERPL-MCNC: 92.3 PG/ML (ref 8.7–77)
RBC # BLD AUTO: 5.5 X10(6)/MCL (ref 4.2–5.4)
RBC #/AREA URNS AUTO: ABNORMAL /HPF
SODIUM SERPL-SCNC: 140 MMOL/L (ref 136–145)
SP GR UR STRIP.AUTO: 1.01 (ref 1–1.03)
SQUAMOUS #/AREA URNS LPF: ABNORMAL /HPF
UROBILINOGEN UR STRIP-ACNC: NORMAL
WBC # SPEC AUTO: 15.54 X10(3)/MCL (ref 4.5–11.5)
WBC #/AREA URNS AUTO: ABNORMAL /HPF

## 2024-05-15 PROCEDURE — 63600175 PHARM REV CODE 636 W HCPCS: Mod: JZ,JG

## 2024-05-15 PROCEDURE — 87040 BLOOD CULTURE FOR BACTERIA: CPT

## 2024-05-15 PROCEDURE — 96374 THER/PROPH/DIAG INJ IV PUSH: CPT

## 2024-05-15 PROCEDURE — 96361 HYDRATE IV INFUSION ADD-ON: CPT

## 2024-05-15 PROCEDURE — 85025 COMPLETE CBC W/AUTO DIFF WBC: CPT | Performed by: FAMILY MEDICINE

## 2024-05-15 PROCEDURE — 25000003 PHARM REV CODE 250

## 2024-05-15 PROCEDURE — 82962 GLUCOSE BLOOD TEST: CPT

## 2024-05-15 PROCEDURE — 83970 ASSAY OF PARATHORMONE: CPT

## 2024-05-15 PROCEDURE — 81001 URINALYSIS AUTO W/SCOPE: CPT | Performed by: FAMILY MEDICINE

## 2024-05-15 PROCEDURE — 63600175 PHARM REV CODE 636 W HCPCS

## 2024-05-15 PROCEDURE — 94761 N-INVAS EAR/PLS OXIMETRY MLT: CPT

## 2024-05-15 PROCEDURE — 83690 ASSAY OF LIPASE: CPT | Performed by: FAMILY MEDICINE

## 2024-05-15 PROCEDURE — 63600175 PHARM REV CODE 636 W HCPCS: Performed by: FAMILY MEDICINE

## 2024-05-15 PROCEDURE — 80053 COMPREHEN METABOLIC PANEL: CPT | Performed by: FAMILY MEDICINE

## 2024-05-15 PROCEDURE — 99285 EMERGENCY DEPT VISIT HI MDM: CPT | Mod: 25

## 2024-05-15 PROCEDURE — 11000001 HC ACUTE MED/SURG PRIVATE ROOM

## 2024-05-15 PROCEDURE — 99900035 HC TECH TIME PER 15 MIN (STAT)

## 2024-05-15 PROCEDURE — 83735 ASSAY OF MAGNESIUM: CPT | Performed by: FAMILY MEDICINE

## 2024-05-15 PROCEDURE — 96375 TX/PRO/DX INJ NEW DRUG ADDON: CPT

## 2024-05-15 RX ORDER — INSULIN ASPART 100 [IU]/ML
0-5 INJECTION, SOLUTION INTRAVENOUS; SUBCUTANEOUS EVERY 6 HOURS PRN
Status: DISCONTINUED | OUTPATIENT
Start: 2024-05-15 | End: 2024-05-18 | Stop reason: HOSPADM

## 2024-05-15 RX ORDER — ENOXAPARIN SODIUM 100 MG/ML
40 INJECTION SUBCUTANEOUS EVERY 24 HOURS
Status: DISCONTINUED | OUTPATIENT
Start: 2024-05-15 | End: 2024-05-18 | Stop reason: HOSPADM

## 2024-05-15 RX ORDER — MORPHINE SULFATE 2 MG/ML
2 INJECTION, SOLUTION INTRAMUSCULAR; INTRAVENOUS
Status: COMPLETED | OUTPATIENT
Start: 2024-05-15 | End: 2024-05-15

## 2024-05-15 RX ORDER — SODIUM CHLORIDE, SODIUM LACTATE, POTASSIUM CHLORIDE, CALCIUM CHLORIDE 600; 310; 30; 20 MG/100ML; MG/100ML; MG/100ML; MG/100ML
INJECTION, SOLUTION INTRAVENOUS CONTINUOUS
Status: DISCONTINUED | OUTPATIENT
Start: 2024-05-15 | End: 2024-05-18 | Stop reason: HOSPADM

## 2024-05-15 RX ORDER — SODIUM CHLORIDE 0.9 % (FLUSH) 0.9 %
10 SYRINGE (ML) INJECTION
Status: DISCONTINUED | OUTPATIENT
Start: 2024-05-15 | End: 2024-05-18 | Stop reason: HOSPADM

## 2024-05-15 RX ORDER — LISINOPRIL 10 MG/1
20 TABLET ORAL DAILY
Status: DISCONTINUED | OUTPATIENT
Start: 2024-05-15 | End: 2024-05-18 | Stop reason: HOSPADM

## 2024-05-15 RX ORDER — ONDANSETRON 4 MG/1
4 TABLET, ORALLY DISINTEGRATING ORAL EVERY 6 HOURS PRN
Qty: 10 TABLET | Refills: 0 | Status: SHIPPED | OUTPATIENT
Start: 2024-05-15 | End: 2024-05-18 | Stop reason: HOSPADM

## 2024-05-15 RX ORDER — HYDROCODONE BITARTRATE AND ACETAMINOPHEN 5; 325 MG/1; MG/1
1 TABLET ORAL EVERY 6 HOURS PRN
Qty: 12 TABLET | Refills: 0 | Status: SHIPPED | OUTPATIENT
Start: 2024-05-15 | End: 2024-05-18 | Stop reason: HOSPADM

## 2024-05-15 RX ORDER — TALC
6 POWDER (GRAM) TOPICAL NIGHTLY PRN
Status: DISCONTINUED | OUTPATIENT
Start: 2024-05-15 | End: 2024-05-18 | Stop reason: HOSPADM

## 2024-05-15 RX ORDER — ALBUTEROL SULFATE 90 UG/1
2 AEROSOL, METERED RESPIRATORY (INHALATION) EVERY 6 HOURS PRN
Status: DISCONTINUED | OUTPATIENT
Start: 2024-05-15 | End: 2024-05-18 | Stop reason: HOSPADM

## 2024-05-15 RX ORDER — ONDANSETRON HYDROCHLORIDE 2 MG/ML
4 INJECTION, SOLUTION INTRAVENOUS
Status: COMPLETED | OUTPATIENT
Start: 2024-05-15 | End: 2024-05-15

## 2024-05-15 RX ORDER — AMOXICILLIN AND CLAVULANATE POTASSIUM 875; 125 MG/1; MG/1
1 TABLET, FILM COATED ORAL 2 TIMES DAILY
Qty: 14 TABLET | Refills: 0 | Status: SHIPPED | OUTPATIENT
Start: 2024-05-15 | End: 2024-05-18 | Stop reason: HOSPADM

## 2024-05-15 RX ORDER — HYDROMORPHONE HYDROCHLORIDE 1 MG/ML
1 INJECTION, SOLUTION INTRAMUSCULAR; INTRAVENOUS; SUBCUTANEOUS EVERY 4 HOURS PRN
Status: DISCONTINUED | OUTPATIENT
Start: 2024-05-15 | End: 2024-05-18 | Stop reason: HOSPADM

## 2024-05-15 RX ORDER — LABETALOL HCL 20 MG/4 ML
10 SYRINGE (ML) INTRAVENOUS EVERY 4 HOURS PRN
Status: DISCONTINUED | OUTPATIENT
Start: 2024-05-15 | End: 2024-05-18 | Stop reason: HOSPADM

## 2024-05-15 RX ORDER — HYDRALAZINE HYDROCHLORIDE 20 MG/ML
10 INJECTION INTRAMUSCULAR; INTRAVENOUS EVERY 4 HOURS PRN
Status: DISCONTINUED | OUTPATIENT
Start: 2024-05-15 | End: 2024-05-18 | Stop reason: HOSPADM

## 2024-05-15 RX ORDER — GLUCAGON 1 MG
1 KIT INJECTION
Status: DISCONTINUED | OUTPATIENT
Start: 2024-05-15 | End: 2024-05-18 | Stop reason: HOSPADM

## 2024-05-15 RX ADMIN — ENOXAPARIN SODIUM 40 MG: 40 INJECTION SUBCUTANEOUS at 05:05

## 2024-05-15 RX ADMIN — PIPERACILLIN SODIUM AND TAZOBACTAM SODIUM 4.5 G: 4; .5 INJECTION, POWDER, LYOPHILIZED, FOR SOLUTION INTRAVENOUS at 09:05

## 2024-05-15 RX ADMIN — HYDROMORPHONE HYDROCHLORIDE 1 MG: 1 INJECTION, SOLUTION INTRAMUSCULAR; INTRAVENOUS; SUBCUTANEOUS at 06:05

## 2024-05-15 RX ADMIN — HYDROMORPHONE HYDROCHLORIDE 1 MG: 1 INJECTION, SOLUTION INTRAMUSCULAR; INTRAVENOUS; SUBCUTANEOUS at 10:05

## 2024-05-15 RX ADMIN — LISINOPRIL 20 MG: 10 TABLET ORAL at 08:05

## 2024-05-15 RX ADMIN — ONDANSETRON 4 MG: 2 INJECTION INTRAMUSCULAR; INTRAVENOUS at 02:05

## 2024-05-15 RX ADMIN — PIPERACILLIN SODIUM AND TAZOBACTAM SODIUM 4.5 G: 4; .5 INJECTION, POWDER, LYOPHILIZED, FOR SOLUTION INTRAVENOUS at 03:05

## 2024-05-15 RX ADMIN — HYDROMORPHONE HYDROCHLORIDE 1 MG: 1 INJECTION, SOLUTION INTRAMUSCULAR; INTRAVENOUS; SUBCUTANEOUS at 05:05

## 2024-05-15 RX ADMIN — SODIUM CHLORIDE, POTASSIUM CHLORIDE, SODIUM LACTATE AND CALCIUM CHLORIDE: 600; 310; 30; 20 INJECTION, SOLUTION INTRAVENOUS at 10:05

## 2024-05-15 RX ADMIN — PIPERACILLIN SODIUM AND TAZOBACTAM SODIUM 4.5 G: 4; .5 INJECTION, POWDER, LYOPHILIZED, FOR SOLUTION INTRAVENOUS at 06:05

## 2024-05-15 RX ADMIN — MORPHINE SULFATE 2 MG: 2 INJECTION, SOLUTION INTRAMUSCULAR; INTRAVENOUS at 02:05

## 2024-05-15 RX ADMIN — SODIUM CHLORIDE, POTASSIUM CHLORIDE, SODIUM LACTATE AND CALCIUM CHLORIDE 1000 ML: 600; 310; 30; 20 INJECTION, SOLUTION INTRAVENOUS at 02:05

## 2024-05-15 RX ADMIN — HYDROMORPHONE HYDROCHLORIDE 1 MG: 1 INJECTION, SOLUTION INTRAMUSCULAR; INTRAVENOUS; SUBCUTANEOUS at 09:05

## 2024-05-15 NOTE — PROGRESS NOTES
Inpatient Nutrition Evaluation    Admit Date: 5/15/2024   Total duration of encounter: 1 day   Patient Age: 78 y.o.    Nutrition Recommendation/Prescription     ADAT to Diabetic, Low Na diet  Boost Glucose Control (provides 190 kcal, 16 g protein per serving) BID   Monitor Weight Weekly     Nutrition Assessment     Chart Review    Reason Seen: malnutrition screening tool (MST)    Malnutrition Screening Tool Results   Have you recently lost weight without trying?: Yes: 2-13 lbs  Have you been eating poorly because of a decreased appetite?: Yes   MST Score: 2   Diagnosis:  Acute Diverticulitis, Hypertensive emergency    Relevant Medical History: HTN, DM    Scheduled Medications:  enoxparin, 40 mg, Daily  lisinopriL, 20 mg, Daily  piperacillin-tazobactam (Zosyn) IV (PEDS and ADULTS) (extended infusion is not appropriate), 4.5 g, Q8H    Continuous Infusions:  lactated ringers, Last Rate: 100 mL/hr at 05/15/24 1031    PRN Medications:   Current Facility-Administered Medications:     albuterol, 2 puff, Inhalation, Q6H PRN    dextrose 10%, 12.5 g, Intravenous, PRN    dextrose 10%, 25 g, Intravenous, PRN    glucagon (human recombinant), 1 mg, Intramuscular, PRN    hydrALAZINE, 10 mg, Intravenous, Q4H PRN    HYDROmorphone, 1 mg, Intravenous, Q4H PRN    insulin aspart U-100, 0-5 Units, Subcutaneous, Q6H PRN    labetalol, 10 mg, Intravenous, Q4H PRN    melatonin, 6 mg, Oral, Nightly PRN    sodium chloride 0.9%, 10 mL, Intravenous, PRN    Recent Labs   Lab 05/15/24  0144      K 4.8   CALCIUM 10.5*   MG 2.30   CHLORIDE 102   CO2 23   BUN 18.3   CREATININE 1.09*   EGFRNORACEVR 51   GLUCOSE 228*   BILITOT 0.4   ALKPHOS 57   ALT 36   AST 27   ALBUMIN 4.0   LIPASE 29   WBC 15.54*   HGB 15.1   HCT 45.3     Nutrition Orders:  Diet Clear Liquid      Appetite/Oral Intake: fair/NPO  Factors Affecting Nutritional Intake: abdominal pain, decreased appetite, nausea, and vomiting  Social Needs Impacting Access to Food: none  "identified  Food/Yazdanism/Cultural Preferences: none reported  Food Allergies: no known food allergies  Last Bowel Movement: 24  Wound(s):  skin intact    Comments    5/15/24 -- Pt NPO this am with plans to advance to CL diet for lunch, noted advancement at time of charting; 1 day h/o n/v & abdominal pain has since resolved; pt report previously with fair appetite related to recent hospitalization for pneumonia; wt loss indicated however not significant; will continue to monitor diet progression & need for alternative nutrition, suggest ONS to supplement meals    Anthropometrics    Height: 5' 4" (162.6 cm), Height Method: Stated  Last Weight: 88 kg (194 lb 0.1 oz) (05/15/24 1244), Weight Method: Bed Scale  BMI (Calculated): 33.3  BMI Classification: obese grade I (BMI 30-34.9)     Ideal Body Weight (IBW), Female: 120 lb     % Ideal Body Weight, Female (lb): 153.23 %                    Usual Body Weight (UBW), k kg  % Usual Body Weight: 88.18  % Weight Change From Usual Weight: -12 %  Usual Weight Provided By: patient    Wt Readings from Last 5 Encounters:   05/15/24 88 kg (194 lb 0.1 oz)   24 89.2 kg (196 lb 9.6 oz)   24 88 kg (194 lb)   03/15/24 90.1 kg (198 lb 10.2 oz)   24 90 kg (198 lb 6.6 oz)     Weight Change(s) Since Admission:   Wt Readings from Last 1 Encounters:   05/15/24 1244 88 kg (194 lb 0.1 oz)   05/15/24 0620 83.4 kg (183 lb 13.8 oz)   05/15/24 0135 88.9 kg (196 lb)   Admit Weight: 88.9 kg (196 lb) (05/15/24 0135), Weight Method: Standard Scale    Patient Education     Not applicable.    Nutrition Goals & Monitoring     Dietitian will monitor: food and beverage intake, weight change, electrolyte/renal panel, glucose/endocrine profile, and gastrointestinal profile  Discharge planning: too early to determine; pending clinical course  Nutrition Risk/Follow-Up: low (follow-up in 5-7 days)  Patients assigned 'low nutrition risk' status do not qualify for a full nutritional " assessment but will be monitored and re-evaluated in a 5-7 day time period. Please consult if re-evaluation needed sooner.

## 2024-05-15 NOTE — PLAN OF CARE
Problem: Adult Inpatient Plan of Care  Goal: Plan of Care Review  Outcome: Progressing  Goal: Patient-Specific Goal (Individualized)  Outcome: Progressing  Goal: Absence of Hospital-Acquired Illness or Injury  Outcome: Progressing  Goal: Optimal Comfort and Wellbeing  Outcome: Progressing  Goal: Readiness for Transition of Care  Outcome: Progressing     Problem: Diabetes Comorbidity  Goal: Blood Glucose Level Within Targeted Range  Outcome: Progressing     Problem: Sepsis/Septic Shock  Goal: Optimal Coping  Outcome: Progressing  Goal: Absence of Bleeding  Outcome: Progressing  Goal: Blood Glucose Level Within Targeted Range  Outcome: Progressing  Goal: Absence of Infection Signs and Symptoms  Outcome: Progressing  Goal: Optimal Nutrition Intake  Outcome: Progressing     Problem: Acute Kidney Injury/Impairment  Goal: Fluid and Electrolyte Balance  Outcome: Progressing  Goal: Improved Oral Intake  Outcome: Progressing  Goal: Effective Renal Function  Outcome: Progressing     Problem: Pneumonia  Goal: Fluid Balance  Outcome: Progressing  Goal: Resolution of Infection Signs and Symptoms  Outcome: Progressing  Goal: Effective Oxygenation and Ventilation  Outcome: Progressing     Problem: Fall Injury Risk  Goal: Absence of Fall and Fall-Related Injury  Outcome: Progressing

## 2024-05-15 NOTE — ED PROVIDER NOTES
Encounter Date: 5/15/2024       History     Chief Complaint   Patient presents with    Abdominal Pain    Vomiting    Nausea     78-year-old female presents emergency room complaints of abdominal pain.  Patient reports symptoms began around 8:00 p.m. last night and have persisted.  Reports pain mainly in the lower abdominal region.  Patient reports nausea.  Reports having a bowel movement just prior to arrival in the emergency room.  Denies dysuria or hematuria.  Denies chest pain or shortness of breath.  Reports feeling in her normal state of health earlier yesterday.    The history is provided by the patient.     Review of patient's allergies indicates:   Allergen Reactions    Iodine Swelling     Past Medical History:   Diagnosis Date    Diabetes mellitus     Hypertension      Past Surgical History:   Procedure Laterality Date    HYSTERECTOMY      MAGNETIC RESONANCE IMAGING N/A 3/15/2024    Procedure: MRI (Magnetic Resonance Imagine);  Surgeon: Susanne Ellington MD;  Location: [facility];  Service: Anesthesiology;  Laterality: N/A;  MRI BRAIN W W/O CONTRAST WITH ANESTHESIA     Family History   Problem Relation Name Age of Onset    Diabetes Mother      Hypertension Mother      Intellectual disability Mother      Cancer Father      Mental illness Sister      Hypertension Sister      Diabetes Brother      Stroke Brother       Social History     Tobacco Use    Smoking status: Former    Smokeless tobacco: Former   Substance Use Topics    Alcohol use: Not Currently    Drug use: Not Currently     Review of Systems   Constitutional:  Negative for chills, fatigue and fever.   HENT:  Negative for ear pain, rhinorrhea and sore throat.    Eyes:  Negative for photophobia and pain.   Respiratory:  Negative for cough, shortness of breath and wheezing.    Cardiovascular:  Negative for chest pain.   Gastrointestinal:  Positive for abdominal pain, nausea and vomiting. Negative for constipation and diarrhea.   Genitourinary:  Negative  for dysuria.   Neurological:  Negative for dizziness, weakness and headaches.   All other systems reviewed and are negative.      Physical Exam     Initial Vitals [05/15/24 0135]   BP Pulse Resp Temp SpO2   (!) 211/98 96 20 98.1 °F (36.7 °C) 98 %      MAP       --         Physical Exam    Nursing note and vitals reviewed.  Constitutional: She appears well-developed and well-nourished. No distress.   Appears uncomfortable due to pain, nontoxic   HENT:   Head: Normocephalic and atraumatic.   Mouth/Throat: Oropharynx is clear and moist.   Eyes: Conjunctivae and EOM are normal. Pupils are equal, round, and reactive to light.   Neck: Neck supple.   Normal range of motion.  Cardiovascular:  Normal rate, regular rhythm, normal heart sounds and intact distal pulses.     Exam reveals no gallop and no friction rub.       No murmur heard.  Pulmonary/Chest: Breath sounds normal. No respiratory distress. She has no wheezes. She has no rhonchi. She has no rales.   Abdominal: Abdomen is soft. Bowel sounds are normal. She exhibits no distension. There is abdominal tenderness.   Mild tenderness to palpation left lower quadrant right lower quadrant, no rebound or guarding.  Normal bowel sounds.  No abdominal distention There is no rebound and no guarding.   Musculoskeletal:         General: Normal range of motion.      Cervical back: Normal range of motion and neck supple.     Neurological: She is alert and oriented to person, place, and time.   Skin: Skin is warm and dry. Capillary refill takes less than 2 seconds. No erythema.   Psychiatric: She has a normal mood and affect. Her behavior is normal. Judgment and thought content normal.         ED Course   Procedures  Labs Reviewed   COMPREHENSIVE METABOLIC PANEL - Abnormal; Notable for the following components:       Result Value    Glucose 228 (*)     Creatinine 1.09 (*)     Calcium 10.5 (*)     Protein Total 9.5 (*)     Globulin 5.5 (*)     Albumin/Globulin Ratio 0.7 (*)     All  other components within normal limits   URINALYSIS, REFLEX TO URINE CULTURE - Abnormal; Notable for the following components:    Squamous Epithelial Cells, UA Trace (*)     Mucous, UA Trace (*)     All other components within normal limits   CBC WITH DIFFERENTIAL - Abnormal; Notable for the following components:    WBC 15.54 (*)     RBC 5.50 (*)     Platelet 411 (*)     MPV 10.6 (*)     Neut # 13.51 (*)     IG# 0.08 (*)     All other components within normal limits   POCT GLUCOSE - Abnormal; Notable for the following components:    POCT Glucose 192 (*)     All other components within normal limits   LIPASE - Normal   MAGNESIUM - Normal   CBC W/ AUTO DIFFERENTIAL    Narrative:     The following orders were created for panel order CBC Auto Differential.  Procedure                               Abnormality         Status                     ---------                               -----------         ------                     CBC with Differential[[id number]]       Abnormal            Final result                 Please view results for these tests on the individual orders.          Imaging Results              CT Abdomen Pelvis  Without Contrast (Preliminary result)  Result time 05/15/24 03:44:09      Preliminary result by Arun Reyes Jr., MD (05/15/24 03:44:09)                   Narrative:    START OF REPORT:  Technique: CT of the abdomen and pelvis was performed with axial images as well as sagittal and coronal reconstruction images without intravenous contrast.    Comparison: None available.    Clinical History: Abdominal Pain Vomiting Nausea.    Dosage Information: Automated Exposure Control was utilized.    Findings:  Thorax:  Lungs: The lungs are slightly heterogeneous, which may reflect small airways disease versus mosaic attenuation.  Pleura: No effusions or thickening are seen.  Heart: The heart size is within normal limits. Severe coronary artery calcification is seen.  Abdomen:  Abdominal Wall: No  abdominal wall pathology is seen.  Liver: Mild fatty infiltration of the liver is present. A calcific density is seen in the liver, likely reflects an old calcified granuloma. The liver otherwise appears unremarkable.  Biliary System: No intrahepatic or extrahepatic biliary duct dilatation is seen.  Gallbladder: A 2.4 x 2.5 cm calculus is noted in the gallbladder, without gallbladder wall thickening or pericholecystic fluid.  Pancreas: The pancreas appears unremarkable.  Spleen: The spleen appears unremarkable.  Adrenals: The adrenal glands appear unremarkable.  Kidneys: The kidneys appear unremarkable with no stones cysts masses or hydronephrosis.  Aorta: There is mild calcification of the abdominal aorta and its branches.  IVC: Unremarkable.  Bowel:  Esophagus: The visualized esophagus appears unremarkable.  Stomach: The stomach appears unremarkable.  Duodenum: Unremarkable appearing duodenum.  Small Bowel: The small bowel appears unremarkable.  Colon: There is moderate stool in the colon which could reflect an element of constipation. Multiple diverticula are seen in the colon. There is mild wall thickening of the proximal sigmoid colon with subtle surrounding fat stranding. This may reflect mild diverticulitis. No perforation or abscess is identified.  Appendix: The appendix appears unremarkable.  Peritoneum: No intraperitoneal free air or ascites is seen.    Pelvis:  Bladder: The bladder appears unremarkable.  Female:  Uterus: The uterus is surgically absent.  Ovaries: No adnexal masses are seen.    Bony structures:  Dorsal Spine: There is mild spondylosis of the visualized dorsal spine.  Bony Pelvis: The visualized bony structures of the pelvis appear unremarkable.      Impression:  1. Mild fatty infiltration of the liver is present.  2. There is moderate stool in the colon which could reflect an element of constipation. Multiple diverticula are seen in the colon. There is mild wall thickening of the proximal  sigmoid colon with subtle surrounding fat stranding. This may reflect mild diverticulitis. No perforation or abscess is identified.  3. A 2.4 x 2.5 cm calculus is noted in the gallbladder, without gallbladder wall thickening or pericholecystic fluid.  4. Details and other findings as discussed above.                                         Medications   ondansetron injection 4 mg (4 mg Intravenous Given 5/15/24 0206)   morphine injection 2 mg (2 mg Intravenous Given 5/15/24 0208)   lactated ringers bolus 1,000 mL ( Intravenous Stopped 5/15/24 0323)     Medical Decision Making  78-year-old female presents emergency room complaints of lower abdominal pain, nausea, vomiting.  Appears uncomfortable, but nontoxic appearing.  Will obtain laboratory evaluation including CBC CMP and urinalysis; will administer IV fluids, likely obtain CT scan of the abdomen pelvis for further evaluation.    Differential diagnosis:  Gastritis, gastroenteritis, colitis, nonspecific abdominal pain, electrolyte abnormality, acute kidney injury, dehydration, small-bowel obstruction    Amount and/or Complexity of Data Reviewed  Labs: ordered. Decision-making details documented in ED Course.  Radiology: ordered.    Risk  Prescription drug management.               ED Course as of 05/15/24 0414   Wed May 15, 2024   0229 Color, UA: Light-Yellow [MW]   0229 Appearance, UA: Clear [MW]   0229 Specific Gravity,UA: 1.011 [MW]   0229 pH, UA: 6.0 [MW]   0229 Protein, UA: Negative [MW]   0229 NITRITE UA: Negative [MW]   0229 Magnesium : 2.30 [MW]   0229 POCT Glucose(!): 192 [MW]   0229 Lipase: 29 [MW]   0229 Sodium: 140 [MW]   0229 Potassium: 4.8 [MW]   0229 Chloride: 102 [MW]   0229 CO2: 23 [MW]   0229 Glucose(!): 228 [MW]   0229 BUN: 18.3 [MW]   0229 Creatinine(!): 1.09 [MW]   0229 Calcium(!): 10.5 [MW]   0229 Albumin: 4.0 [MW]   0229 WBC(!): 15.54 [MW]   0229 Hemoglobin: 15.1 [MW]   0229 Hematocrit: 45.3 [MW]   0229 Platelet Count(!): 411 [MW]   0237 No  acute abnormality noted on urinalysis   White blood cell count 15.54, normal hemoglobin hematocrit.  Neutrophil 87% without bands.  On CMP, BUN 18.3, creatinine 10.09.  Calcium slightly elevated at 10.5.  Currently receiving IV fluids.  Due to leukocytosis, will obtain a CT scan of the abdomen pelvis.  Patient does have an iodine allergy, so will obtain a noncontrast CT.  Will continue to monitor. [MW]   0358 Patient feeling much improved.  On CT scan, noted to have diverticulitis.  Stable for discharge to home.  ER precautions for any acute worsening. [MW]   0409 Patient was feeling improved, but when she tried to get up, she is now reporting that she is having significant abdominal pain.  Due to age and diverticulitis with high likelyhood of returning to the hospital within the next 48 hrs, will consult internal medicine for admission.  Discussed with patient and family, and agreeable with plan.  Internal Medicine consulted for admission. [MW]      ED Course User Index  [MW] Francisco Syed MD                             Clinical Impression:  Final diagnoses:  [K57.32] Diverticulitis large intestine w/o perforation or abscess w/o bleeding (Primary)          ED Disposition Condition    Observation Stable                  Francisco Syed MD  05/15/24 [id number]       Francisco Syed MD  05/15/24 [id number]

## 2024-05-15 NOTE — H&P
[facility] Medicine Wards History & Physical Note     Resident Team: [facility] Medicine List 2  Attending Physician: Jonathan Apple MD  Resident: Albert Henriquez MD  Intern: Best Turcios MD     Date of Admit: 5/15/2024    Chief Complaint     Abdominal Pain, Vomiting, and Nausea       Subjective:      History of Present Illness:  Charlotte Choi is a 78 y.o.  female with a history of HTN and DM II who presented on 5/15/2024  with c/o lower abdominal pain onset this afternoon.  Pain is described as sharp in his across her entire lower abdomen.  Does not radiate anywhere.  Pepto-Bismol and milk of magnesia has not tried it in the relief.  She has been able to produce a bowel movement since the passing started.  Pain is associated with vomiting and nausea.  She has had a total of 3 episodes of vomitus, no bleeding noted.  She denied any fevers, but does report having an episode of chills and blurry vision.  She denies any headaches, chest pain, shortness for breath, lower extremity edema, or no sick contacts.  She initially thought her pain was from eating the fish, but family member also had the fish did not have the same symptoms.      In the ED she initially presented with a pulse of 96, blood pressure of 211/98, 98.1° F, and satting 98% on room air.  White count was 15.54, creatinine 1.09, and glucose of 128.  CT abdomen and pelvis showed mild wall thickening of the proximal sigmoid colon with subtle fat stranding, suggestive of mild diverticulitis.  A 2.4 x 2.5 cm calculus was also noted in her gallbladder.  She was given a bolus of LR, 2 mg of morphine and 4 mg of Zofran in the ED. internal Medicine consulted for management of acute diverticulitis.      Past Medical History:  Past Medical History:   Diagnosis Date    Diabetes mellitus     Hypertension        Past Surgical History:  Past Surgical History:   Procedure Laterality Date    HYSTERECTOMY      MAGNETIC RESONANCE IMAGING N/A 3/15/2024    Procedure: MRI (Magnetic  Resonance Imagine);  Surgeon: Susanne Ellington MD;  Location: [facility] OR;  Service: Anesthesiology;  Laterality: N/A;  MRI BRAIN W W/O CONTRAST WITH ANESTHESIA       Family History:  Family History   Problem Relation Name Age of Onset    Diabetes Mother      Hypertension Mother      Intellectual disability Mother      Cancer Father      Mental illness Sister      Hypertension Sister      Diabetes Brother      Stroke Brother         Social History:  Social History     Tobacco Use    Smoking status: Former    Smokeless tobacco: Former   Substance Use Topics    Alcohol use: Not Currently    Drug use: Not Currently       Allergies:  Review of patient's allergies indicates:   Allergen Reactions    Iodine Swelling       Home Medications:  Prior to Admission medications    Medication Sig Start Date End Date Taking? Authorizing Provider   albuterol (PROAIR HFA) 90 mcg/actuation inhaler Inhale 2 puffs into the lungs every 6 (six) hours as needed for Wheezing. Rescue 9/20/23   Pawan Oneil DO   albuterol (PROVENTIL HFA) 90 mcg/actuation inhaler Inhale 2 puffs into the lungs every 6 (six) hours as needed for Shortness of Breath. Rescue 5/13/24   Akosua Rangel MD   amLODIPine (NORVASC) 10 MG tablet Take 1 tablet (10 mg total) by mouth once daily. 1/10/24 1/9/25  Pawan Oneil DO   amoxicillin-clavulanate 875-125mg (AUGMENTIN) 875-125 mg per tablet Take 1 tablet by mouth 2 (two) times daily. for 7 days 5/15/24 5/22/24  Francisco Syed MD   atorvastatin (LIPITOR) 40 MG tablet Take 1 tablet (40 mg total) by mouth once daily. 9/20/23 9/19/24  Pawan Oneil DO   DULoxetine (CYMBALTA) 20 MG capsule Take 1 capsule (20 mg total) by mouth once daily. 6/1/23 5/31/24  Pawan Oneil DO   fluticasone propionate (FLONASE) 50 mcg/actuation nasal spray 1 spray (50 mcg total) by Each Nostril route once daily. 9/20/23   Pawan Oneil DO   gabapentin (NEURONTIN) 300 MG capsule Take 1 capsule (300 mg total) by mouth 3 (three)  times daily. 23  Pawan Oneil DO   HYDROcodone-acetaminophen (NORCO) 5-325 mg per tablet Take 1 tablet by mouth every 6 (six) hours as needed for Pain. 5/15/24 5/20/24  Francisco Syed MD   ibuprofen (ADVIL,MOTRIN) 600 MG tablet SMARTSI Tablet(s) By Mouth Every 12 Hours PRN 3/25/22   Provider, Historical   latanoprost 0.005 % ophthalmic solution Place 1 drop into both eyes once daily. 23   Provider, Historical   lisinopriL (PRINIVIL,ZESTRIL) 20 MG tablet Take 1 tablet (20 mg total) by mouth once daily. 1/10/24 1/9/25  Pawan Oneil DO   metFORMIN (GLUCOPHAGE) 500 MG tablet Take 1 tablet (500 mg total) by mouth 2 (two) times daily with meals. 23  Pawan Oneil DO   metoprolol succinate (TOPROL-XL) 50 MG 24 hr tablet Take 1 tablet (50 mg total) by mouth once daily. 23  Pawan Oneil DO   ondansetron (ZOFRAN-ODT) 4 MG TbDL Take 1 tablet (4 mg total) by mouth every 6 (six) hours as needed (nausea vomiting). 5/15/24 5/20/24  Francisco Syed MD   triamcinolone acetonide 0.1% (KENALOG) 0.1 % cream Apply topically 2 (two) times daily. 24   Pawan Oneil DO   triamcinolone acetonide 0.1% (KENALOG) 0.1 % cream Apply 1 g (1,000 mg total) topically 2 (two) times daily. 24   Akosua Rangel MD   triamterene-hydrochlorothiazide 75-50 mg (MAXZIDE) 75-50 mg per tablet Take 1 tablet by mouth once daily. 1/10/24   Pawan Oneil DO         Review of Systems:  Review of Systems   Constitutional:  Positive for chills. Negative for fever.   HENT:          Positive blurry vision   Respiratory:  Negative for cough, shortness of breath and wheezing.    Cardiovascular:  Negative for chest pain, palpitations and leg swelling.   Gastrointestinal:  Positive for abdominal pain, nausea and vomiting. Negative for diarrhea.   Genitourinary:  Negative for dysuria.   Neurological:  Negative for dizziness, tingling, weakness and headaches.             Objective:   Last  "24 Hour Vital Signs:  BP  Min: 159/68  Max: 211/98  Temp  Av.1 °F (36.7 °C)  Min: 98.1 °F (36.7 °C)  Max: 98.1 °F (36.7 °C)  Pulse  Av.2  Min: 91  Max: 103  Resp  Av.9  Min: 15  Max: 27  SpO2  Av %  Min: 93 %  Max: 98 %  Height  Av' 4" (162.6 cm)  Min: 5' 4" (162.6 cm)  Max: 5' 4" (162.6 cm)  Weight  Av.9 kg (196 lb)  Min: 88.9 kg (196 lb)  Max: 88.9 kg (196 lb)  Body mass index is 33.64 kg/m².  No intake/output data recorded.    Physical Examination:  Physical Exam  Constitutional:       General: She is not in acute distress.     Appearance: She is ill-appearing.      Comments: Uncomfortable   Cardiovascular:      Rate and Rhythm: Normal rate and regular rhythm.      Pulses: Normal pulses.      Heart sounds: No murmur heard.  Pulmonary:      Effort: No respiratory distress.      Breath sounds: Normal breath sounds. No wheezing.   Abdominal:      Palpations: Abdomen is soft.      Tenderness: There is abdominal tenderness.      Comments: Tenderness and left lower quadrant, right lower quadrant, and suprapubic region   Skin:     General: Skin is warm and dry.   Neurological:      Motor: No weakness.   Psychiatric:         Mood and Affect: Mood normal.         Behavior: Behavior normal.          Laboratory:  Most Recent Data:  CBC:   Lab Results   Component Value Date    WBC 15.54 (H) 05/15/2024    HGB 15.1 05/15/2024    HCT 45.3 05/15/2024     (H) 05/15/2024    MCV 82.4 05/15/2024    RDW 13.3 05/15/2024     WBC Differential:   Recent Labs   Lab 05/15/24  0144   WBC 15.54*   HGB 15.1   HCT 45.3   *   MCV 82.4     BMP:   Lab Results   Component Value Date     05/15/2024    K 4.8 05/15/2024    CO2 23 05/15/2024    BUN 18.3 05/15/2024    CREATININE 1.09 (H) 05/15/2024    CALCIUM 10.5 (H) 05/15/2024    MG 2.30 05/15/2024    PHOS 2.2 (L) 2024     LFTs:   Lab Results   Component Value Date    ALBUMIN 4.0 05/15/2024    BILITOT 0.4 05/15/2024    AST 27 05/15/2024    " "ALKPHOS 57 05/15/2024    ALT 36 05/15/2024     Coags: No results found for: "INR", "PROTIME", "PTT"  FLP:   Lab Results   Component Value Date    CHOL 170 09/18/2023    HDL 39 09/18/2023    LDLCALC 110 07/20/2020    TRIG 166 (H) 09/18/2023     DM:   Lab Results   Component Value Date    HGBA1C 6.7 05/02/2024    HGBA1C 6.4 (A) 11/17/2020    LDLCALC 110 07/20/2020    CREATININE 1.09 (H) 05/15/2024     Thyroid:   Lab Results   Component Value Date    TSH 0.618 05/02/2024      Anemia: No results found for: "IRON", "TIBC", "FERRITIN", "SATURATEDIRO"  No results found for: "GTYHBPXA36"  No results found for: "FOLATE"     Cardiac:   Lab Results   Component Value Date    TROPONINI 0.014 05/01/2024    BNP 30.1 05/01/2024     Urinalysis:   Lab Results   Component Value Date    LABURIN >/= 100,000 colonies/ml Proteus mirabilis (A) 03/13/2024    PHUA 6.0 05/15/2024    UROBILINOGEN Normal 05/15/2024    WBCUA 0-5 05/15/2024       Trended Lab Data:  Recent Labs   Lab 05/15/24  0144   WBC 15.54*   HGB 15.1   HCT 45.3   *   MCV 82.4   RDW 13.3      K 4.8   CO2 23   BUN 18.3   CREATININE 1.09*   ALBUMIN 4.0   BILITOT 0.4   AST 27   ALKPHOS 57   ALT 36       Trended Cardiac Data:  No results for input(s): "TROPONINI", "CKTOTAL", "CKMB", "BNP" in the last 168 hours.    Microbiology Data:  Microbiology Results (last 7 days)       Procedure Component Value Units Date/Time    Blood Culture [[id number]]     Order Status: Sent Specimen: Blood     Blood Culture [[id number]]     Order Status: Sent Specimen: Blood              Other Results:  EKG:   Results for orders placed or performed during the hospital encounter of 05/01/24   EKG 12-lead    Collection Time: 05/01/24  9:41 AM   Result Value Ref Range    QRS Duration 88 ms    OHS QTC Calculation 425 ms    Narrative    Test Reason : A41.9,    Vent. Rate : 106 BPM     Atrial Rate : 106 BPM     P-R Int : 138 ms          QRS Dur : 088 ms      QT Int : 320 ms       P-R-T Axes : 054 " 037 044 degrees     QTc Int : 425 ms    Sinus tachycardia  Possible Left atrial enlargement  Borderline Abnormal ECG  When compared with ECG of 16-JAN-2023 18:45,  No significant change was found  Confirmed by Gretta Fiore MD ([id number]) on 5/1/2024 6:04:44 PM    Referred By:             Confirmed By:Gretta Fiore MD       Radiology:  Imaging Results              CT Abdomen Pelvis  Without Contrast (Preliminary result)  Result time 05/15/24 03:44:09      Preliminary result by Arun Reyes Jr., MD (05/15/24 03:44:09)                   Narrative:    START OF REPORT:  Technique: CT of the abdomen and pelvis was performed with axial images as well as sagittal and coronal reconstruction images without intravenous contrast.    Comparison: None available.    Clinical History: Abdominal Pain Vomiting Nausea.    Dosage Information: Automated Exposure Control was utilized.    Findings:  Thorax:  Lungs: The lungs are slightly heterogeneous, which may reflect small airways disease versus mosaic attenuation.  Pleura: No effusions or thickening are seen.  Heart: The heart size is within normal limits. Severe coronary artery calcification is seen.  Abdomen:  Abdominal Wall: No abdominal wall pathology is seen.  Liver: Mild fatty infiltration of the liver is present. A calcific density is seen in the liver, likely reflects an old calcified granuloma. The liver otherwise appears unremarkable.  Biliary System: No intrahepatic or extrahepatic biliary duct dilatation is seen.  Gallbladder: A 2.4 x 2.5 cm calculus is noted in the gallbladder, without gallbladder wall thickening or pericholecystic fluid.  Pancreas: The pancreas appears unremarkable.  Spleen: The spleen appears unremarkable.  Adrenals: The adrenal glands appear unremarkable.  Kidneys: The kidneys appear unremarkable with no stones cysts masses or hydronephrosis.  Aorta: There is mild calcification of the abdominal aorta and its branches.  IVC:  Unremarkable.  Bowel:  Esophagus: The visualized esophagus appears unremarkable.  Stomach: The stomach appears unremarkable.  Duodenum: Unremarkable appearing duodenum.  Small Bowel: The small bowel appears unremarkable.  Colon: There is moderate stool in the colon which could reflect an element of constipation. Multiple diverticula are seen in the colon. There is mild wall thickening of the proximal sigmoid colon with subtle surrounding fat stranding. This may reflect mild diverticulitis. No perforation or abscess is identified.  Appendix: The appendix appears unremarkable.  Peritoneum: No intraperitoneal free air or ascites is seen.    Pelvis:  Bladder: The bladder appears unremarkable.  Female:  Uterus: The uterus is surgically absent.  Ovaries: No adnexal masses are seen.    Bony structures:  Dorsal Spine: There is mild spondylosis of the visualized dorsal spine.  Bony Pelvis: The visualized bony structures of the pelvis appear unremarkable.      Impression:  1. Mild fatty infiltration of the liver is present.  2. There is moderate stool in the colon which could reflect an element of constipation. Multiple diverticula are seen in the colon. There is mild wall thickening of the proximal sigmoid colon with subtle surrounding fat stranding. This may reflect mild diverticulitis. No perforation or abscess is identified.  3. A 2.4 x 2.5 cm calculus is noted in the gallbladder, without gallbladder wall thickening or pericholecystic fluid.  4. Details and other findings as discussed above.                                           Assessment & Plan:     Acute diverticulitis  -white count of 15  -CT scan shows evidence of diverticulitis in the sigmoid colon  -we will start her on Zosyn we 4.5 mg q.8 hours  - Dilaudid 1 mg q.4 hours p.r.n. for pain  - lactated Ringer's started at 100 mL/hr  - patient to be NPO  - consider CTA run off for acute mesenteric ischemia workup if pain remains uncontrolled    Hypertensive  emergency  -initially presented with BP of 211/98  -creatinine elevated to 1.09  -holding home medicines  -labetalol 20 mg IV ordered p.r.n.    Diabetes mellitus 2  -sugars in the 200s  -we will start patient on sliding scale insulin    CODE STATUS:  Full code   Diet:  NPO  DVT Prophylaxis:   Anticoagulants   Medication Route Frequency    enoxaparin injection 40 mg Subcutaneous Daily             Best Turcios MD  [facility] Medicine HO-1

## 2024-05-16 LAB
ALBUMIN SERPL-MCNC: 3.3 G/DL (ref 3.4–4.8)
ALBUMIN/GLOB SERPL: 0.8 RATIO (ref 1.1–2)
ALP SERPL-CCNC: 50 UNIT/L (ref 40–150)
ALT SERPL-CCNC: 55 UNIT/L (ref 0–55)
AST SERPL-CCNC: 37 UNIT/L (ref 5–34)
BASOPHILS # BLD AUTO: 0.05 X10(3)/MCL
BASOPHILS NFR BLD AUTO: 0.3 %
BILIRUB SERPL-MCNC: 0.7 MG/DL
BUN SERPL-MCNC: 21.3 MG/DL (ref 9.8–20.1)
CALCIUM SERPL-MCNC: 9.4 MG/DL (ref 8.4–10.2)
CHLORIDE SERPL-SCNC: 100 MMOL/L (ref 98–107)
CO2 SERPL-SCNC: 31 MMOL/L (ref 23–31)
CREAT SERPL-MCNC: 1.06 MG/DL (ref 0.55–1.02)
EOSINOPHIL # BLD AUTO: 0.02 X10(3)/MCL (ref 0–0.9)
EOSINOPHIL NFR BLD AUTO: 0.1 %
ERYTHROCYTE [DISTWIDTH] IN BLOOD BY AUTOMATED COUNT: 13.6 % (ref 11.5–17)
GFR SERPLBLD CREATININE-BSD FMLA CKD-EPI: 53 ML/MIN/1.73/M2
GLOBULIN SER-MCNC: 4.3 GM/DL (ref 2.4–3.5)
GLUCOSE SERPL-MCNC: 129 MG/DL (ref 82–115)
HCT VFR BLD AUTO: 41.2 % (ref 37–47)
HGB BLD-MCNC: 13.4 G/DL (ref 12–16)
IMM GRANULOCYTES # BLD AUTO: 0.06 X10(3)/MCL (ref 0–0.04)
IMM GRANULOCYTES NFR BLD AUTO: 0.4 %
LYMPHOCYTES # BLD AUTO: 3.32 X10(3)/MCL (ref 0.6–4.6)
LYMPHOCYTES NFR BLD AUTO: 22.6 %
MCH RBC QN AUTO: 27.6 PG (ref 27–31)
MCHC RBC AUTO-ENTMCNC: 32.5 G/DL (ref 33–36)
MCV RBC AUTO: 84.9 FL (ref 80–94)
MONOCYTES # BLD AUTO: 1.06 X10(3)/MCL (ref 0.1–1.3)
MONOCYTES NFR BLD AUTO: 7.2 %
NEUTROPHILS # BLD AUTO: 10.21 X10(3)/MCL (ref 2.1–9.2)
NEUTROPHILS NFR BLD AUTO: 69.4 %
NRBC BLD AUTO-RTO: 0 %
PLATELET # BLD AUTO: 389 X10(3)/MCL (ref 130–400)
PMV BLD AUTO: 10.9 FL (ref 7.4–10.4)
POCT GLUCOSE: 113 MG/DL (ref 70–110)
POCT GLUCOSE: 138 MG/DL (ref 70–110)
POCT GLUCOSE: 140 MG/DL (ref 70–110)
POCT GLUCOSE: 168 MG/DL (ref 70–110)
POTASSIUM SERPL-SCNC: 4.2 MMOL/L (ref 3.5–5.1)
PROT SERPL-MCNC: 7.6 GM/DL (ref 5.8–7.6)
RBC # BLD AUTO: 4.85 X10(6)/MCL (ref 4.2–5.4)
SODIUM SERPL-SCNC: 139 MMOL/L (ref 136–145)
WBC # SPEC AUTO: 14.72 X10(3)/MCL (ref 4.5–11.5)

## 2024-05-16 PROCEDURE — 94761 N-INVAS EAR/PLS OXIMETRY MLT: CPT

## 2024-05-16 PROCEDURE — 63600175 PHARM REV CODE 636 W HCPCS

## 2024-05-16 PROCEDURE — 99900035 HC TECH TIME PER 15 MIN (STAT)

## 2024-05-16 PROCEDURE — 85025 COMPLETE CBC W/AUTO DIFF WBC: CPT

## 2024-05-16 PROCEDURE — 36415 COLL VENOUS BLD VENIPUNCTURE: CPT

## 2024-05-16 PROCEDURE — 80053 COMPREHEN METABOLIC PANEL: CPT

## 2024-05-16 PROCEDURE — 25000003 PHARM REV CODE 250: Performed by: STUDENT IN AN ORGANIZED HEALTH CARE EDUCATION/TRAINING PROGRAM

## 2024-05-16 PROCEDURE — 11000001 HC ACUTE MED/SURG PRIVATE ROOM

## 2024-05-16 PROCEDURE — 25000003 PHARM REV CODE 250

## 2024-05-16 RX ORDER — DIPHENHYDRAMINE HCL 25 MG
25 CAPSULE ORAL ONCE
Status: DISCONTINUED | OUTPATIENT
Start: 2024-05-16 | End: 2024-05-18

## 2024-05-16 RX ORDER — DIPHENHYDRAMINE HCL 25 MG
25 CAPSULE ORAL ONCE
Status: COMPLETED | OUTPATIENT
Start: 2024-05-16 | End: 2024-05-16

## 2024-05-16 RX ADMIN — DIPHENHYDRAMINE HYDROCHLORIDE 25 MG: 25 CAPSULE ORAL at 06:05

## 2024-05-16 RX ADMIN — PIPERACILLIN SODIUM AND TAZOBACTAM SODIUM 4.5 G: 4; .5 INJECTION, POWDER, LYOPHILIZED, FOR SOLUTION INTRAVENOUS at 05:05

## 2024-05-16 RX ADMIN — LISINOPRIL 20 MG: 10 TABLET ORAL at 09:05

## 2024-05-16 RX ADMIN — SODIUM CHLORIDE, POTASSIUM CHLORIDE, SODIUM LACTATE AND CALCIUM CHLORIDE: 600; 310; 30; 20 INJECTION, SOLUTION INTRAVENOUS at 05:05

## 2024-05-16 RX ADMIN — ENOXAPARIN SODIUM 40 MG: 40 INJECTION SUBCUTANEOUS at 05:05

## 2024-05-16 RX ADMIN — PIPERACILLIN SODIUM AND TAZOBACTAM SODIUM 4.5 G: 4; .5 INJECTION, POWDER, LYOPHILIZED, FOR SOLUTION INTRAVENOUS at 02:05

## 2024-05-16 RX ADMIN — PIPERACILLIN SODIUM AND TAZOBACTAM SODIUM 4.5 G: 4; .5 INJECTION, POWDER, LYOPHILIZED, FOR SOLUTION INTRAVENOUS at 09:05

## 2024-05-16 RX ADMIN — HYDROMORPHONE HYDROCHLORIDE 1 MG: 1 INJECTION, SOLUTION INTRAMUSCULAR; INTRAVENOUS; SUBCUTANEOUS at 04:05

## 2024-05-16 NOTE — PLAN OF CARE
05/16/24 1030   Discharge Assessment   Assessment Type Discharge Planning Assessment   Confirmed/corrected address, phone number and insurance Yes   Confirmed Demographics Correct on Facesheet   Source of Information patient   When was your last doctors appointment?   (PCP: Pawan Oneil)   Does patient/caregiver understand observation status   (Inpatient)   Communicated CHANEL with patient/caregiver Date not available/Unable to determine   Reason For Admission Fall; IVORY; Sepsis; CAP   People in Home alone   Facility Arrived From: Home   Do you expect to return to your current living situation? Yes   Do you have help at home or someone to help you manage your care at home? Yes   Who are your caregiver(s) and their phone number(s)? Ivanna, daughter, P: [phone]; Lisbeth Gurrola, granddaughter, P: [phone]; Kylie, daughter, P: [phone]; Shana, granddaughter, P: [phone]; Mariela Villeda, sister, P: [phone]   Prior to hospitilization cognitive status: Alert/Oriented;No Deficits   Current cognitive status: Alert/Oriented;No Deficits   Walking or Climbing Stairs Difficulty yes   Walking or Climbing Stairs ambulation difficulty, requires equipment   Mobility Management Cane, rollator   Dressing/Bathing Difficulty no   Home Layout Able to live on 1st floor   Equipment Currently Used at Home rollator;cane, straight   Readmission within 30 days? Yes   Patient currently being followed by outpatient case management? No   Do you currently have service(s) that help you manage your care at home? Yes   Name and Contact number of agency [facility]    Is the pt/caregiver preference to resume services with current agency Yes   Do you take prescription medications? Yes   Do you have prescription coverage? Yes   Coverage [employer]   Do you have any problems affording any of your prescribed medications? No   Is the patient taking medications as prescribed? yes   Who is going to help you get  home at discharge? Family   How do you get to doctors appointments? family or friend will provide   Are you on dialysis? No   Do you take coumadin? No   Discharge Plan A Home;Home Health   DME Needed Upon Discharge    (TBD)   Discharge Plan discussed with: Patient;Adult children   Transition of Care Barriers None     Patient lives alone, but has multiple family members who assist her; current with [facility]; will need order to resume; CM will follow for DC planning needs.

## 2024-05-16 NOTE — CONSULTS
Consult to resume HH noted. Patient is current with [facility] . Referral packet and order have been sent via [employer].

## 2024-05-16 NOTE — PROGRESS NOTES
[facility] Medicine  Progress Note      Patient Name: Charlotte Choi  : 1945  MRN: [id number]  Patient Class: IP- Inpatient   Admission Date: 5/15/2024   Length of Stay: 1  Admitting Service: Hospital Medicine  Attending Physician: Jonathan Apple MD  PCP: Pawan Oneil DO    CHIEF COMPLAINT   Hospital follow up    HOSPITAL COURSE     Brief HPI:  Charlotte Choi is a 78 y.o.  female with a history of HTN and DM II who presented on 5/15/2024  with c/o lower abdominal pain onset this afternoon.  Pain is described as sharp in his across her entire lower abdomen.  Does not radiate anywhere.  Pepto-Bismol and milk of magnesia has not tried it in the relief.  She has been able to produce a bowel movement since the passing started.  Pain is associated with vomiting and nausea.  She has had a total of 3 episodes of vomitus, no bleeding noted.  She denied any fevers, but does report having an episode of chills and blurry vision.  She denies any headaches, chest pain, shortness for breath, lower extremity edema, or no sick contacts.  She initially thought her pain was from eating the fish, but family member also had the fish did not have the same symptoms.       In the ED she initially presented with a pulse of 96, blood pressure of 211/98, 98.1° F, and satting 98% on room air.  White count was 15.54, creatinine 1.09, and glucose of 128.  CT abdomen and pelvis showed mild wall thickening of the proximal sigmoid colon with subtle fat stranding, suggestive of mild diverticulitis.  A 2.4 x 2.5 cm calculus was also noted in her gallbladder.  She was given a bolus of LR, 2 mg of morphine and 4 mg of Zofran in the ED. internal Medicine consulted for management of acute diverticulitis.      Interval History:  Still reports pain and nausea. Only able to tolerate fluids but unable to hold down jello.       OBJECTIVE/PHYSICAL EXAM     VITAL SIGNS (MOST RECENT):  Temp: 98.2 °F (36.8 °C) (24  "0713)  Pulse: 80 (05/16/24 0713)  Resp: 17 (05/16/24 0713)  BP: 108/69 (05/16/24 0713)  SpO2: (!) 93 % (05/16/24 0713) VITAL SIGNS (24 HOUR RANGE):  Temp:  [98.2 °F (36.8 °C)-98.9 °F (37.2 °C)]   Pulse:  [80-86]   Resp:  [17-18]   BP: (103-165)/(67-82)   SpO2:  [93 %-95 %]      Physical Exam  Cardiovascular:      Rate and Rhythm: Normal rate and regular rhythm.   Pulmonary:      Effort: Pulmonary effort is normal.      Breath sounds: Normal breath sounds.   Abdominal:      Comments: Generalized lower abdominal tenderness   Skin:     General: Skin is warm and dry.   Neurological:      Mental Status: She is alert and oriented to person, place, and time.         LABS/MICRO/MEDS/DIAGNOSTICS     LABS  CBC  Recent Labs     05/15/24  0144 05/16/24  0346   WBC 15.54* 14.72*   RBC 5.50* 4.85   HGB 15.1 13.4   HCT 45.3 41.2   MCV 82.4 84.9   MCH 27.5 27.6   MCHC 33.3 32.5*   RDW 13.3 13.6   * 389     Anemia  No results for input(s): "HAPTOGLOBIN", "FERRITIN", "IRON", "TIBC", "[id number]", "FOLATE" in the last 72 hours.  Coags  No results for input(s): "INR", "APTT", "D-DIMER" in the last 72 hours.  Cardiac  No results for input(s): "BNP", "CPK", "TROPONINI" in the last 72 hours.  ABG/Lactate  No results for input(s): "PH", "PCO2", "PO2", "HCO3", "POCSATURATED", "BE", "LACTIC" in the last 72 hours.    BMP  Recent Labs     05/15/24  0144 05/16/24  0346    139   K 4.8 4.2   CHLORIDE 102 100   CO2 23 31   BUN 18.3 21.3*   CREATININE 1.09* 1.06*   GLUCOSE 228* 129*   CALCIUM 10.5* 9.4   MG 2.30  --      LFTs  Recent Labs     05/15/24  0144 05/16/24  0346   ALBUMIN 4.0 3.3*   GLOBULIN 5.5* 4.3*   ALKPHOS 57 50   ALT 36 55   AST 27 37*   BILITOT 0.4 0.7   LIPASE 29  --      Inflammatory Markers  No results for input(s): "CRP", "LDH", "ESR" in the last 72 hours.  Lipid  No results for input(s): "CHOL", "TRIG", "LDL", "VLDL", "HDL" in the last 72 hours.  Diabetes  Recent Labs     05/15/24  0144 05/16/24  0346   GLUCOSE " "228* 129*     Thyroid  No results for input(s): "TSH", "FREET4" in the last 72 hours.     INTAKE/OUTPUT    Intake/Output Summary (Last 24 hours) at 5/16/2024 1107  Last data filed at 5/16/2024 0639  Gross per 24 hour   Intake 1415.71 ml   Output --   Net 1415.71 ml        MICROBIOLOGY  Microbiology Results (last 7 days)       Procedure Component Value Units Date/Time    Blood Culture [[id number]]  (Normal) Collected: 05/15/24 0501    Order Status: Completed Specimen: Blood from Wrist, Right Updated: 05/16/24 1100     Blood Culture No Growth At 24 Hours    Blood Culture [[id number]]  (Normal) Collected: 05/15/24 0500    Order Status: Completed Specimen: Blood from Arm, Right Updated: 05/16/24 1100     Blood Culture No Growth At 24 Hours             MEDICATIONS   diphenhydrAMINE  25 mg Oral Once    enoxparin  40 mg Subcutaneous Daily    lisinopriL  20 mg Oral Daily    piperacillin-tazobactam (Zosyn) IV (PEDS and ADULTS) (extended infusion is not appropriate)  4.5 g Intravenous Q8H         INFUSIONS   lactated ringers   Intravenous Continuous   Stopped at 05/16/24 0514        DIAGNOSTIC TESTS  CT Abdomen Pelvis  Without Contrast   Final Result   Impression:      1. Mild fatty infiltration of the liver is present.      2. There is moderate stool in the colon which could reflect an element of constipation. Multiple diverticula are seen in the colon. There is mild wall thickening of the proximal sigmoid colon with subtle surrounding fat stranding. This may reflect mild diverticulitis. No perforation or abscess is identified.      3. A 2.4 x 2.5 cm calculus is noted in the gallbladder, without gallbladder wall thickening or pericholecystic fluid.      4. Details and other findings as discussed above.      No significant discrepancy with overnight report.         Electronically signed by: Andrade Rai   Date:    05/15/2024   Time:    08:06           No results found for: "EF"       ASSESSMENT/PLAN     Acute " diverticulitis  - CT scan shows evidence of diverticulitis in the sigmoid colon  - we will start her on Zosyn 4.5 mg q.8 hours  - morphine 4 mg prn pain  -  cc/hr  - continue clear liquid diet; advance as tolerated  - CT with contrast held due to contrast allergy     Hypertensive emergency  - BP controlled  - Cr stable at 1.06  - holding home medicines  - labetalol and hydralazine prn     Diabetes mellitus 2  - glucose controlled today  - we will start patient on sliding scale insulin    Anticipated discharge and disposition: Pending control of patient's nausea and advance diet as tolerated.       Stephen Domingo MD  Family Medicine, [facility]

## 2024-05-17 LAB
ALBUMIN SERPL-MCNC: 2.8 G/DL (ref 3.4–4.8)
ALBUMIN/GLOB SERPL: 0.7 RATIO (ref 1.1–2)
ALP SERPL-CCNC: 43 UNIT/L (ref 40–150)
ALT SERPL-CCNC: 37 UNIT/L (ref 0–55)
AST SERPL-CCNC: 16 UNIT/L (ref 5–34)
BASOPHILS # BLD AUTO: 0.08 X10(3)/MCL
BASOPHILS NFR BLD AUTO: 0.7 %
BILIRUB SERPL-MCNC: 1.1 MG/DL
BUN SERPL-MCNC: 11.6 MG/DL (ref 9.8–20.1)
CALCIUM SERPL-MCNC: 9.1 MG/DL (ref 8.4–10.2)
CHLORIDE SERPL-SCNC: 104 MMOL/L (ref 98–107)
CO2 SERPL-SCNC: 27 MMOL/L (ref 23–31)
CREAT SERPL-MCNC: 0.84 MG/DL (ref 0.55–1.02)
EOSINOPHIL # BLD AUTO: 0.13 X10(3)/MCL (ref 0–0.9)
EOSINOPHIL NFR BLD AUTO: 1.1 %
ERYTHROCYTE [DISTWIDTH] IN BLOOD BY AUTOMATED COUNT: 13.4 % (ref 11.5–17)
GFR SERPLBLD CREATININE-BSD FMLA CKD-EPI: >60 ML/MIN/1.73/M2
GLOBULIN SER-MCNC: 4.1 GM/DL (ref 2.4–3.5)
GLUCOSE SERPL-MCNC: 147 MG/DL (ref 82–115)
HCT VFR BLD AUTO: 37.4 % (ref 37–47)
HGB BLD-MCNC: 12.1 G/DL (ref 12–16)
IMM GRANULOCYTES # BLD AUTO: 0.05 X10(3)/MCL (ref 0–0.04)
IMM GRANULOCYTES NFR BLD AUTO: 0.4 %
LYMPHOCYTES # BLD AUTO: 3.45 X10(3)/MCL (ref 0.6–4.6)
LYMPHOCYTES NFR BLD AUTO: 29 %
MCH RBC QN AUTO: 27.3 PG (ref 27–31)
MCHC RBC AUTO-ENTMCNC: 32.4 G/DL (ref 33–36)
MCV RBC AUTO: 84.2 FL (ref 80–94)
MONOCYTES # BLD AUTO: 0.83 X10(3)/MCL (ref 0.1–1.3)
MONOCYTES NFR BLD AUTO: 7 %
NEUTROPHILS # BLD AUTO: 7.37 X10(3)/MCL (ref 2.1–9.2)
NEUTROPHILS NFR BLD AUTO: 61.8 %
NRBC BLD AUTO-RTO: 0 %
PLATELET # BLD AUTO: 312 X10(3)/MCL (ref 130–400)
PMV BLD AUTO: 10.7 FL (ref 7.4–10.4)
POCT GLUCOSE: 112 MG/DL (ref 70–110)
POCT GLUCOSE: 118 MG/DL (ref 70–110)
POCT GLUCOSE: 121 MG/DL (ref 70–110)
POCT GLUCOSE: 131 MG/DL (ref 70–110)
POTASSIUM SERPL-SCNC: 3.7 MMOL/L (ref 3.5–5.1)
PROT SERPL-MCNC: 6.9 GM/DL (ref 5.8–7.6)
RBC # BLD AUTO: 4.44 X10(6)/MCL (ref 4.2–5.4)
SODIUM SERPL-SCNC: 138 MMOL/L (ref 136–145)
WBC # SPEC AUTO: 11.91 X10(3)/MCL (ref 4.5–11.5)

## 2024-05-17 PROCEDURE — 80053 COMPREHEN METABOLIC PANEL: CPT

## 2024-05-17 PROCEDURE — 11000001 HC ACUTE MED/SURG PRIVATE ROOM

## 2024-05-17 PROCEDURE — 99900035 HC TECH TIME PER 15 MIN (STAT)

## 2024-05-17 PROCEDURE — 63600175 PHARM REV CODE 636 W HCPCS

## 2024-05-17 PROCEDURE — 25000003 PHARM REV CODE 250

## 2024-05-17 PROCEDURE — 97162 PT EVAL MOD COMPLEX 30 MIN: CPT

## 2024-05-17 PROCEDURE — 94761 N-INVAS EAR/PLS OXIMETRY MLT: CPT

## 2024-05-17 PROCEDURE — 85025 COMPLETE CBC W/AUTO DIFF WBC: CPT

## 2024-05-17 PROCEDURE — 36415 COLL VENOUS BLD VENIPUNCTURE: CPT

## 2024-05-17 RX ADMIN — SODIUM CHLORIDE, POTASSIUM CHLORIDE, SODIUM LACTATE AND CALCIUM CHLORIDE: 600; 310; 30; 20 INJECTION, SOLUTION INTRAVENOUS at 11:05

## 2024-05-17 RX ADMIN — PIPERACILLIN SODIUM AND TAZOBACTAM SODIUM 4.5 G: 4; .5 INJECTION, POWDER, LYOPHILIZED, FOR SOLUTION INTRAVENOUS at 06:05

## 2024-05-17 RX ADMIN — LISINOPRIL 20 MG: 10 TABLET ORAL at 10:05

## 2024-05-17 RX ADMIN — HYDROMORPHONE HYDROCHLORIDE 1 MG: 1 INJECTION, SOLUTION INTRAMUSCULAR; INTRAVENOUS; SUBCUTANEOUS at 10:05

## 2024-05-17 RX ADMIN — SODIUM CHLORIDE, POTASSIUM CHLORIDE, SODIUM LACTATE AND CALCIUM CHLORIDE: 600; 310; 30; 20 INJECTION, SOLUTION INTRAVENOUS at 09:05

## 2024-05-17 RX ADMIN — PIPERACILLIN SODIUM AND TAZOBACTAM SODIUM 4.5 G: 4; .5 INJECTION, POWDER, LYOPHILIZED, FOR SOLUTION INTRAVENOUS at 09:05

## 2024-05-17 RX ADMIN — PIPERACILLIN SODIUM AND TAZOBACTAM SODIUM 4.5 G: 4; .5 INJECTION, POWDER, LYOPHILIZED, FOR SOLUTION INTRAVENOUS at 03:05

## 2024-05-17 RX ADMIN — ENOXAPARIN SODIUM 40 MG: 40 INJECTION SUBCUTANEOUS at 04:05

## 2024-05-17 RX ADMIN — SODIUM CHLORIDE, POTASSIUM CHLORIDE, SODIUM LACTATE AND CALCIUM CHLORIDE: 600; 310; 30; 20 INJECTION, SOLUTION INTRAVENOUS at 01:05

## 2024-05-17 RX ADMIN — HYDROMORPHONE HYDROCHLORIDE 1 MG: 1 INJECTION, SOLUTION INTRAMUSCULAR; INTRAVENOUS; SUBCUTANEOUS at 07:05

## 2024-05-17 NOTE — PT/OT/SLP EVAL
Physical Therapy Evaluation    Patient Name:  Charlotte Choi   MRN:  [id number]    Recommendations:     Therapy Intensity Recommendations at Discharge: Low Intensity Therapy  Discharge Equipment Recommendations: rollator, shower chair   Equipment to be obtained for discharge: shower chair.  Barriers to discharge: decreased endurance    Assessment:     Charlotte Choi is a 78 y.o. female admitted with a medical diagnosis of Diverticulitis.  1. Diverticulitis large intestine w/o perforation or abscess w/o bleeding       Patient Active Problem List   Diagnosis    Hypertension    Spinal stenosis of lumbar region    Type 2 diabetes mellitus    Hyperlipidemia    IVORY (acute kidney injury)    Sepsis due to pneumonia    Moderate malnutrition    Diverticulitis      She presents with the following impairments/functional limitations:  impaired endurance, impaired functional mobility, pain, gait instability, impaired balance.    Rehab Prognosis: Good.    Patient would benefit from continued skilled acute PT services to: address above listed impairments/functional limitations; receive patient/caregiver education; reduce fall risk; and maximize independency/safety with functional mobility.    -continued: up-to-chair, ambulation, with progression of gait distance/frequency/duration and speed, as tolerated/appropriate, with assistance and supervision     Recent Surgery: * No surgery found *      Plan:     During this hospitalization, patient to be seen 5 x/week to address the identified impairments/functional limitations via gait training, therapeutic activities, therapeutic exercises and progress toward the established goals.    Plan of Care Expires:  06/14/24    Subjective     Communicated with patient's nurse Kathy prior to session.    Patient agreeable to participate in evaluation.     Chief Complaint: belly pain  Patient/Family Comments/goals: home  Pain/Comfort:  Pain Rating 1: 7/10  Location - Orientation 1:  lower  Location 1: abdomen  Pain Addressed 1: Nurse notified  Pain Rating Post-Intervention 1: 7/10    Patients cultural, spiritual, Orthodox conflicts given the current situation: no    Social History  Living Environment: Patient lives with their daughter and son in a single level home, with 4 steps steps outside, with left handrail, ramp, with tub-shower combo.  Functional Level: Prior to admission patient was retired, was driving, was independent in ADL's, ambulated with assistive device, and required assistance with IADL's including household chores (patient does laundry and cooks/meal preps) and yard work.  Equipment Used at Home: rollator, cane, straight  Equipment owned (not currently used): none.  Assistance Upon Discharge: family.    Hand dominance: right    Patient denied lightheadedness/dizziness.  Patient denied extremity tingling/numbness.  Patient denied current swallowing difficulty.  Patient denied 'new' vision impairment.     Objective:     PM&R TECH Christiano into room during therapy session    Patient found supine in bed, with HOB elevated, and bed rails up bilateral HOB with peripheral IV (bedside commode in room)  upon PT entry to room.    General Precautions: Standard,     Orthopedic Precautions:N/A   Braces: N/A  Respiratory Status: room air    Vitals   At Rest (pre-session)  BP  117/68   HR  83   O2 Sat %  95      With Activity (post-session)  BP  112/66   HR  84   O2 Sat %  96     Exams:  Orientation: Patient is oriented to person, place, time, situation  Commands: Patient follows multi-step verbal commands  Fine Motor Coordination:     -     Intact: LLE heel shin, RLE heel shin, Rapid alternating ankle DF/PF, Left hand thumb/finger opposition skills, and Right hand thumb/finger opposition skills  Sensation:    -     Intact: light/touch bilat lower extremity and bilat upper extremity  RUE ROM: WFL  RUE Strength: WFL  LUE ROM: WFL  LUE Strength: WFL  RLE ROM: WFL  RLE Strength: WFL  LLE ROM:  WFL  LLE Strength: WFL    Functional Mobility:    Bed Mobility:  Rolling Left: supervision  Scooting: supervision  Supine to Sit: supervision  with no cues required  with HOB elevated, hand rail, and firm mattress    Transfers:  Sit to Stand: contact guard assistance with rolling walker  with no cues required  with firm mattress    Gait:  Patient ambulated 60ft with rolling walker and stand by assistance-contact guard assistance.  Patient demonstrates :       steady gait       no loss of balance       no mis-steps       decreased devante       decreased step length       wide base of support       flexed posture.    Other Mobility:  not assessed    Balance:  Sit  Patient demonstrated static balance on level surface with independence with no verbal cues.  Patient demonstrated dynamic balance on level surface with independence with no verbal cues during maximal excursions.  Stand  Patient demonstrated static balance on level surface  using rolling walker with modified independence with no verbal cues.    Additional Treatment Session  n/a    Patient left sitting edge of bed with peripheral IV with all lines intact, call button in reach, tray table at bedside, bedside commode at bedside, and patient' nurse notified.    Education     Patient educated on the importance of early mobility to prevent functional decline during hospital stay.  Patient educated on and assisted with functional mobility as noted above.  Patient educated on PT Plan of Care and role of PT in acute care.  Patient was instructed to utilize staff assistance for mobility/transfers.  White board updated regarding patient's safest level of mobility with staff assistance.    Goals     Multidisciplinary Problems       Physical Therapy Goals       Problem: Physical Therapy    Goal Priority Disciplines Outcome Goal Variances Interventions   Physical Therapy Goal     PT, PT/OT      Description: ESTABLISHED 05/17/2024  Goals to be met by: DISCHARGE  Patient  will increase functional independence with mobility by performing:  -. Supine to sit with Modified [place]  -. Sit to supine with Modified [place]  -. Rolling to Left and Right with [place]  -. Sit to stand transfer with Modified [place]  -. Gait  x 130 feet with Supervision using Rolling Walker  -. Ascend/descend 4 stair with left Handrails Contact Guard Assistance using No Assistive Device           History:     Past Medical History:   Diagnosis Date    Diabetes mellitus     Hypertension      Past Surgical History:   Procedure Laterality Date    HYSTERECTOMY      MAGNETIC RESONANCE IMAGING N/A 3/15/2024    Procedure: MRI (Magnetic Resonance Imagine);  Surgeon: Susanne Ellington MD;  Location: [facility];  Service: Anesthesiology;  Laterality: N/A;  MRI BRAIN W W/O CONTRAST WITH ANESTHESIA     Time Tracking:     PT Received On: 05/17/24  PT Start Time: 1230     PT Stop Time: 1302  PT Total Time (min): 32 min     Billable Minutes: Evaluation 32  Non-Billable Minutes: n/a  05/17/2024

## 2024-05-17 NOTE — PROGRESS NOTES
[facility]  Progress Note      Patient Name: Charltote Choi  : 1945  MRN: [id number]  Patient Class: IP- Inpatient   Admission Date: 5/15/2024   Length of Stay: 2  Admitting Service: Hospital Medicine  Attending Physician: Jonathan Apple MD  PCP: Pawan Oneil DO    CHIEF COMPLAINT   Hospital follow up    HOSPITAL COURSE     Brief HPI:  Charlotte Choi is a 78 y.o.  female with a history of HTN and DM II who presented on 5/15/2024  with c/o lower abdominal pain onset this afternoon.  Pain is described as sharp in his across her entire lower abdomen.  Does not radiate anywhere.  Pepto-Bismol and milk of magnesia has not tried it in the relief.  She has been able to produce a bowel movement since the passing started.  Pain is associated with vomiting and nausea.  She has had a total of 3 episodes of vomitus, no bleeding noted.  She denied any fevers, but does report having an episode of chills and blurry vision.  She denies any headaches, chest pain, shortness for breath, lower extremity edema, or no sick contacts.  She initially thought her pain was from eating the fish, but family member also had the fish did not have the same symptoms.       In the ED she initially presented with a pulse of 96, blood pressure of 211/98, 98.1° F, and satting 98% on room air.  White count was 15.54, creatinine 1.09, and glucose of 128.  CT abdomen and pelvis showed mild wall thickening of the proximal sigmoid colon with subtle fat stranding, suggestive of mild diverticulitis.  A 2.4 x 2.5 cm calculus was also noted in her gallbladder.  She was given a bolus of LR, 2 mg of morphine and 4 mg of Zofran in the ED. internal Medicine consulted for management of acute diverticulitis.    Interval History:  Patient states she was feeling better this morning but after breakfast grits she began having pain again.     OBJECTIVE/PHYSICAL EXAM     VITAL SIGNS (MOST RECENT):  Temp: 98.3 °F (36.8 °C)  "(05/17/24 1148)  Pulse: 73 (05/17/24 1148)  Resp: 18 (05/17/24 1148)  BP: 110/65 (05/17/24 1148)  SpO2: (!) 94 % (05/17/24 1148) VITAL SIGNS (24 HOUR RANGE):  Temp:  [98.3 °F (36.8 °C)-98.9 °F (37.2 °C)]   Pulse:  [73-85]   Resp:  [16-18]   BP: (110-132)/(65-80)   SpO2:  [94 %-96 %]      Physical Exam  Cardiovascular:      Rate and Rhythm: Normal rate and regular rhythm.   Pulmonary:      Effort: Pulmonary effort is normal.      Breath sounds: Normal breath sounds.   Abdominal:      Comments: Generalized lower abdominal tenderness   Skin:     General: Skin is warm and dry.   Neurological:      Mental Status: She is alert and oriented to person, place, and time.     LABS/MICRO/MEDS/DIAGNOSTICS     LABS  CBC  Recent Labs     05/16/24  0346 05/17/24  0450   WBC 14.72* 11.91*   RBC 4.85 4.44   HGB 13.4 12.1   HCT 41.2 37.4   MCV 84.9 84.2   MCH 27.6 27.3   MCHC 32.5* 32.4*   RDW 13.6 13.4    312     Anemia  No results for input(s): "HAPTOGLOBIN", "FERRITIN", "IRON", "TIBC", "[id number]", "FOLATE" in the last 72 hours.  Coags  No results for input(s): "INR", "APTT", "D-DIMER" in the last 72 hours.  Cardiac  No results for input(s): "BNP", "CPK", "TROPONINI" in the last 72 hours.  ABG/Lactate  No results for input(s): "PH", "PCO2", "PO2", "HCO3", "POCSATURATED", "BE", "LACTIC" in the last 72 hours.    BMP  Recent Labs     05/15/24  0144 05/16/24  0346 05/17/24  0450    139 138   K 4.8 4.2 3.7   CHLORIDE 102 100 104   CO2 23 31 27   BUN 18.3 21.3* 11.6   CREATININE 1.09* 1.06* 0.84   GLUCOSE 228* 129* 147*   CALCIUM 10.5* 9.4 9.1   MG 2.30  --   --      LFTs  Recent Labs     05/15/24  0144 05/16/24  0346 05/17/24  0450   ALBUMIN 4.0 3.3* 2.8*   GLOBULIN 5.5* 4.3* 4.1*   ALKPHOS 57 50 43   ALT 36 55 37   AST 27 37* 16   BILITOT 0.4 0.7 1.1   LIPASE 29  --   --      Inflammatory Markers  No results for input(s): "CRP", "LDH", "ESR" in the last 72 hours.  Lipid  No results for input(s): "CHOL", "TRIG", "LDL", " ""VLDL", "HDL" in the last 72 hours.  Diabetes  Recent Labs     05/15/24  0144 05/16/24  0346 05/17/24  0450   GLUCOSE 228* 129* 147*     Thyroid  No results for input(s): "TSH", "FREET4" in the last 72 hours.     INTAKE/OUTPUT    Intake/Output Summary (Last 24 hours) at 5/17/2024 1348  Last data filed at 5/17/2024 0800  Gross per 24 hour   Intake 2155.31 ml   Output --   Net 2155.31 ml        MICROBIOLOGY  Microbiology Results (last 7 days)       Procedure Component Value Units Date/Time    Blood Culture [[id number]]  (Normal) Collected: 05/15/24 0501    Order Status: Completed Specimen: Blood from Wrist, Right Updated: 05/17/24 1100     Blood Culture No Growth At 48 Hours    Blood Culture [[id number]]  (Normal) Collected: 05/15/24 0500    Order Status: Completed Specimen: Blood from Arm, Right Updated: 05/17/24 1100     Blood Culture No Growth At 48 Hours             MEDICATIONS   diphenhydrAMINE  25 mg Oral Once    enoxparin  40 mg Subcutaneous Daily    lisinopriL  20 mg Oral Daily    piperacillin-tazobactam (Zosyn) IV (PEDS and ADULTS) (extended infusion is not appropriate)  4.5 g Intravenous Q8H         INFUSIONS   lactated ringers   Intravenous Continuous 100 mL/hr at 05/17/24 1104 New Bag at 05/17/24 1104        DIAGNOSTIC TESTS  CT Abdomen Pelvis  Without Contrast   Final Result   Impression:      1. Mild fatty infiltration of the liver is present.      2. There is moderate stool in the colon which could reflect an element of constipation. Multiple diverticula are seen in the colon. There is mild wall thickening of the proximal sigmoid colon with subtle surrounding fat stranding. This may reflect mild diverticulitis. No perforation or abscess is identified.      3. A 2.4 x 2.5 cm calculus is noted in the gallbladder, without gallbladder wall thickening or pericholecystic fluid.      4. Details and other findings as discussed above.      No significant discrepancy with overnight report.         Electronically " "signed by: Andrade Rai   Date:    05/15/2024   Time:    08:06           No results found for: "EF"       ASSESSMENT/PLAN     Acute diverticulitis  - CT scan shows evidence of diverticulitis in the sigmoid colon  - we will start her on Zosyn 4.5 mg q.8 hours  - morphine 4 mg prn pain  -  cc/hr  - continue clear liquid diet; advance as tolerated  - CT with contrast held due to contrast allergy     Hypertensive emergency  - BP controlled  - Cr stable at 0.84  - holding home medicines  - labetalol and hydralazine prn     Diabetes mellitus 2  - glucose controlled today  - we will start patient on sliding scale insulin     Anticipated discharge and disposition: Pending control of patient's nausea and advance diet as tolerated.         Stephen Domingo MD  Family Medicine, [place]                "

## 2024-05-18 VITALS
HEIGHT: 64 IN | WEIGHT: 194 LBS | OXYGEN SATURATION: 97 % | TEMPERATURE: 99 F | SYSTOLIC BLOOD PRESSURE: 152 MMHG | BODY MASS INDEX: 33.12 KG/M2 | RESPIRATION RATE: 18 BRPM | HEART RATE: 64 BPM | DIASTOLIC BLOOD PRESSURE: 75 MMHG

## 2024-05-18 LAB
ALBUMIN SERPL-MCNC: 2.8 G/DL (ref 3.4–4.8)
ALBUMIN/GLOB SERPL: 0.7 RATIO (ref 1.1–2)
ALP SERPL-CCNC: 44 UNIT/L (ref 40–150)
ALT SERPL-CCNC: 37 UNIT/L (ref 0–55)
ANION GAP SERPL CALC-SCNC: 6 MEQ/L
AST SERPL-CCNC: 22 UNIT/L (ref 5–34)
BASOPHILS # BLD AUTO: 0.07 X10(3)/MCL
BASOPHILS NFR BLD AUTO: 1 %
BILIRUB SERPL-MCNC: 0.8 MG/DL
BUN SERPL-MCNC: 7.7 MG/DL (ref 9.8–20.1)
CALCIUM SERPL-MCNC: 9 MG/DL (ref 8.4–10.2)
CHLORIDE SERPL-SCNC: 106 MMOL/L (ref 98–107)
CO2 SERPL-SCNC: 27 MMOL/L (ref 23–31)
CREAT SERPL-MCNC: 0.83 MG/DL (ref 0.55–1.02)
CREAT/UREA NIT SERPL: 9
EOSINOPHIL # BLD AUTO: 0.13 X10(3)/MCL (ref 0–0.9)
EOSINOPHIL NFR BLD AUTO: 1.9 %
ERYTHROCYTE [DISTWIDTH] IN BLOOD BY AUTOMATED COUNT: 13.3 % (ref 11.5–17)
GFR SERPLBLD CREATININE-BSD FMLA CKD-EPI: >60 ML/MIN/1.73/M2
GLOBULIN SER-MCNC: 3.9 GM/DL (ref 2.4–3.5)
GLUCOSE SERPL-MCNC: 149 MG/DL (ref 82–115)
HCT VFR BLD AUTO: 36.3 % (ref 37–47)
HGB BLD-MCNC: 11.5 G/DL (ref 12–16)
HOLD SPECIMEN: NORMAL
HOLD SPECIMEN: NORMAL
IMM GRANULOCYTES # BLD AUTO: 0.02 X10(3)/MCL (ref 0–0.04)
IMM GRANULOCYTES NFR BLD AUTO: 0.3 %
LYMPHOCYTES # BLD AUTO: 2.46 X10(3)/MCL (ref 0.6–4.6)
LYMPHOCYTES NFR BLD AUTO: 36.6 %
MCH RBC QN AUTO: 27.1 PG (ref 27–31)
MCHC RBC AUTO-ENTMCNC: 31.7 G/DL (ref 33–36)
MCV RBC AUTO: 85.4 FL (ref 80–94)
MONOCYTES # BLD AUTO: 0.57 X10(3)/MCL (ref 0.1–1.3)
MONOCYTES NFR BLD AUTO: 8.5 %
NEUTROPHILS # BLD AUTO: 3.48 X10(3)/MCL (ref 2.1–9.2)
NEUTROPHILS NFR BLD AUTO: 51.7 %
NRBC BLD AUTO-RTO: 0 %
PLATELET # BLD AUTO: 295 X10(3)/MCL (ref 130–400)
PMV BLD AUTO: 11.2 FL (ref 7.4–10.4)
POCT GLUCOSE: 121 MG/DL (ref 70–110)
POCT GLUCOSE: 125 MG/DL (ref 70–110)
POTASSIUM SERPL-SCNC: 3.9 MMOL/L (ref 3.5–5.1)
PROT SERPL-MCNC: 6.7 GM/DL (ref 5.8–7.6)
RBC # BLD AUTO: 4.25 X10(6)/MCL (ref 4.2–5.4)
SODIUM SERPL-SCNC: 139 MMOL/L (ref 136–145)
WBC # SPEC AUTO: 6.73 X10(3)/MCL (ref 4.5–11.5)

## 2024-05-18 PROCEDURE — 99900035 HC TECH TIME PER 15 MIN (STAT)

## 2024-05-18 PROCEDURE — 25000003 PHARM REV CODE 250

## 2024-05-18 PROCEDURE — 85025 COMPLETE CBC W/AUTO DIFF WBC: CPT

## 2024-05-18 PROCEDURE — 63600175 PHARM REV CODE 636 W HCPCS

## 2024-05-18 PROCEDURE — 94760 N-INVAS EAR/PLS OXIMETRY 1: CPT

## 2024-05-18 PROCEDURE — 80053 COMPREHEN METABOLIC PANEL: CPT

## 2024-05-18 PROCEDURE — 36415 COLL VENOUS BLD VENIPUNCTURE: CPT

## 2024-05-18 RX ORDER — CIPROFLOXACIN 500 MG/1
500 TABLET ORAL 2 TIMES DAILY
Qty: 14 TABLET | Refills: 0 | Status: SHIPPED | OUTPATIENT
Start: 2024-05-18 | End: 2024-05-25

## 2024-05-18 RX ORDER — CIPROFLOXACIN 500 MG/1
500 TABLET ORAL 2 TIMES DAILY
Qty: 14 TABLET | Refills: 0 | Status: SHIPPED | OUTPATIENT
Start: 2024-05-18 | End: 2024-05-18

## 2024-05-18 RX ORDER — METRONIDAZOLE 500 MG/1
500 TABLET ORAL EVERY 12 HOURS
Qty: 14 TABLET | Refills: 0 | Status: SHIPPED | OUTPATIENT
Start: 2024-05-18 | End: 2024-05-25

## 2024-05-18 RX ORDER — METRONIDAZOLE 500 MG/1
500 TABLET ORAL EVERY 12 HOURS
Qty: 14 TABLET | Refills: 0 | Status: SHIPPED | OUTPATIENT
Start: 2024-05-18 | End: 2024-05-18

## 2024-05-18 RX ADMIN — PIPERACILLIN SODIUM AND TAZOBACTAM SODIUM 4.5 G: 4; .5 INJECTION, POWDER, LYOPHILIZED, FOR SOLUTION INTRAVENOUS at 06:05

## 2024-05-18 RX ADMIN — SODIUM CHLORIDE, POTASSIUM CHLORIDE, SODIUM LACTATE AND CALCIUM CHLORIDE: 600; 310; 30; 20 INJECTION, SOLUTION INTRAVENOUS at 01:05

## 2024-05-18 RX ADMIN — HYDROMORPHONE HYDROCHLORIDE 1 MG: 1 INJECTION, SOLUTION INTRAMUSCULAR; INTRAVENOUS; SUBCUTANEOUS at 05:05

## 2024-05-18 RX ADMIN — LISINOPRIL 20 MG: 10 TABLET ORAL at 08:05

## 2024-05-18 NOTE — DISCHARGE SUMMARY
[facility] Discharge Summary    Admitting Physician: Paulo Goodson MD  Attending Physician: Jonathan Apple MD  Date of Admit: 5/15/2024  Date of Discharge: 5/18/2024    Condition: Stable  Outcome: Patient tolerated treatment/procedure well without complication and is now ready for discharge.  DISPOSITION: Home or Self Care    Discharge Diagnoses     Principal Problem:  Diverticulitis  Active Hospital Problems    Diagnosis  POA    *Diverticulitis [K57.92]  Yes      Resolved Hospital Problems   No resolved problems to display.       Patient Active Problem List   Diagnosis    Hypertension    Spinal stenosis of lumbar region    Type 2 diabetes mellitus    Hyperlipidemia    IVORY (acute kidney injury)    Sepsis due to pneumonia    Moderate malnutrition    Diverticulitis       Consultants and Procedures     Consultants:  IP CONSULT TO HOSPITAL MEDICINE  IP CONSULT TO SOCIAL WORK/CASE MANAGEMENT  IP CONSULT TO SOCIAL WORK/CASE MANAGEMENT    Procedures:   * No surgery found *     Brief Admission History      Brief HPI:  Charlotte Choi is a 78 y.o.  female with a history of HTN and DM II who presented on 5/15/2024  with c/o lower abdominal pain onset this afternoon.  Pain is described as sharp in his across her entire lower abdomen.  Does not radiate anywhere.  Pepto-Bismol and milk of magnesia has not tried it in the relief.  She has been able to produce a bowel movement since the passing started.  Pain is associated with vomiting and nausea.  She has had a total of 3 episodes of vomitus, no bleeding noted.  She denied any fevers, but does report having an episode of chills and blurry vision.  She denies any headaches, chest pain, shortness for breath, lower extremity edema, or no sick contacts.  She initially thought her pain was from eating the fish, but family member also had the fish did not have the same symptoms.       In the ED she initially presented with a pulse of 96, blood pressure of 211/98,  "98.1° F, and satting 98% on room air.  White count was 15.54, creatinine 1.09, and glucose of 128.  CT abdomen and pelvis showed mild wall thickening of the proximal sigmoid colon with subtle fat stranding, suggestive of mild diverticulitis.  A 2.4 x 2.5 cm calculus was also noted in her gallbladder.  She was given a bolus of LR, 2 mg of morphine and 4 mg of Zofran in the ED. internal Medicine consulted for management of acute diverticulitis.    Hospital Course with Pertinent Findings     Zosyn started due to diverticulitis. Patient's vitals remained stable over course of admission. Creatinine trended down. Patient's pain and nausea well-controlled with dilaudid and zofran. Blood cultures no growth to date. Over the course of the admission patient was able to progress her diet. Abdominal pain resolved and patient was able to have solid foods and have bowel movements. Aftercare instructions discussed. Patient understands and agrees with plan. Will discharge with Cipo and flagyl.       Discharge physical exam:  Vitals  BP: (!) 152/75  Temp: 98.6 °F (37 °C)  Temp Source: Oral  Pulse: 64  Resp: 18  SpO2: 97 %  Height: 5' 4" (162.6 cm)  Weight: 88 kg (194 lb 0.1 oz)    Physical Exam  Constitutional:       General: She is not in acute distress.  Cardiovascular:      Rate and Rhythm: Normal rate and regular rhythm.   Pulmonary:      Effort: Pulmonary effort is normal. No respiratory distress.   Abdominal:      Palpations: Abdomen is soft.      Tenderness: There is no abdominal tenderness.   Skin:     General: Skin is warm and dry.   Neurological:      Mental Status: She is alert and oriented to person, place, and time.         TIME SPENT ON DISCHARGE: 60 minutes    Discharge Medications        Medication List        START taking these medications      ciprofloxacin HCl 500 MG tablet  Commonly known as: CIPRO  Take 1 tablet (500 mg total) by mouth 2 (two) times daily. for 7 days     metroNIDAZOLE 500 MG tablet  Commonly " known as: FLAGYL  Take 1 tablet (500 mg total) by mouth every 12 (twelve) hours. for 7 days            CONTINUE taking these medications      * albuterol 90 mcg/actuation inhaler  Commonly known as: PROAIR HFA  Inhale 2 puffs into the lungs every 6 (six) hours as needed for Wheezing. Rescue     * albuterol 90 mcg/actuation inhaler  Commonly known as: PROVENTIL HFA  Inhale 2 puffs into the lungs every 6 (six) hours as needed for Shortness of Breath. Rescue     amLODIPine 10 MG tablet  Commonly known as: NORVASC  Take 1 tablet (10 mg total) by mouth once daily.     atorvastatin 40 MG tablet  Commonly known as: LIPITOR  Take 1 tablet (40 mg total) by mouth once daily.     DULoxetine 20 MG capsule  Commonly known as: CYMBALTA  Take 1 capsule (20 mg total) by mouth once daily.     fluticasone propionate 50 mcg/actuation nasal spray  Commonly known as: FLONASE  1 spray (50 mcg total) by Each Nostril route once daily.     gabapentin 300 MG capsule  Commonly known as: NEURONTIN  Take 1 capsule (300 mg total) by mouth 3 (three) times daily.     ibuprofen 600 MG tablet  Commonly known as: ADVIL,MOTRIN     latanoprost 0.005 % ophthalmic solution     lisinopriL 20 MG tablet  Commonly known as: PRINIVIL,ZESTRIL  Take 1 tablet (20 mg total) by mouth once daily.     metFORMIN 500 MG tablet  Commonly known as: GLUCOPHAGE  Take 1 tablet (500 mg total) by mouth 2 (two) times daily with meals.     metoprolol succinate 50 MG 24 hr tablet  Commonly known as: TOPROL-XL  Take 1 tablet (50 mg total) by mouth once daily.     * triamcinolone acetonide 0.1% 0.1 % cream  Commonly known as: KENALOG  Apply topically 2 (two) times daily.     * triamcinolone acetonide 0.1% 0.1 % cream  Commonly known as: KENALOG  Apply 1 g (1,000 mg total) topically 2 (two) times daily.     triamterene-hydrochlorothiazide 75-50 mg 75-50 mg per tablet  Commonly known as: MAXZIDE  Take 1 tablet by mouth once daily.           * This list has 4 medication(s) that are  the same as other medications prescribed for you. Read the directions carefully, and ask your doctor or other care provider to review them with you.                   Where to Get Your Medications        These medications were sent to [facility]. [address] [address]      Phone: [phone]   ciprofloxacin HCl 500 MG tablet  metroNIDAZOLE 500 MG tablet         Discharge Information:     Complete all medications as prescribed.     ED precautions discussed including but not limited to Fever, abdominal pain, vomiting, diarrhea, dizziness, shortness of breath, chest pain.     Maintain the following appointments:   Follow-up Information       Pawan Oneil DO.    Specialty: Internal Medicine  Contact information:  [address] [facility] [address] [phone]               [facility].    Specialty: Emergency Medicine  Why: As needed, If symptoms worsen  Contact information:  [address] [facility] [address] [phone]                           Stephen Domingo MD  [facility]

## 2024-05-18 NOTE — PLAN OF CARE
Problem: Adult Inpatient Plan of Care  Goal: Plan of Care Review  Outcome: Met  Goal: Patient-Specific Goal (Individualized)  Outcome: Met  Goal: Absence of Hospital-Acquired Illness or Injury  Outcome: Met  Goal: Optimal Comfort and Wellbeing  Outcome: Met  Goal: Readiness for Transition of Care  Outcome: Met     Problem: Diabetes Comorbidity  Goal: Blood Glucose Level Within Targeted Range  Outcome: Met     Problem: Sepsis/Septic Shock  Goal: Optimal Coping  Outcome: Met  Goal: Absence of Bleeding  Outcome: Met  Goal: Blood Glucose Level Within Targeted Range  Outcome: Met  Goal: Absence of Infection Signs and Symptoms  Outcome: Met  Goal: Optimal Nutrition Intake  Outcome: Met     Problem: Acute Kidney Injury/Impairment  Goal: Fluid and Electrolyte Balance  Outcome: Met  Goal: Improved Oral Intake  Outcome: Met  Goal: Effective Renal Function  Outcome: Met     Problem: Pneumonia  Goal: Fluid Balance  Outcome: Met  Goal: Resolution of Infection Signs and Symptoms  Outcome: Met  Goal: Effective Oxygenation and Ventilation  Outcome: Met     Problem: Fall Injury Risk  Goal: Absence of Fall and Fall-Related Injury  Outcome: Met     Problem: Physical Therapy  Goal: Physical Therapy Goal  Description: ESTABLISHED 05/17/2024  Goals to be met by: DISCHARGE  Patient will increase functional independence with mobility by performing:  -. Supine to sit with Modified [place]  -. Sit to supine with Modified [place]  -. Rolling to Left and Right with [place]  -. Sit to stand transfer with Modified [place]  -. Gait  x 130 feet with Supervision using Rolling Walker  -. Ascend/descend 4 stair with left Handrails Contact Guard Assistance using No Assistive Device  Outcome: Met

## 2024-05-20 LAB
BACTERIA BLD CULT: NORMAL
BACTERIA BLD CULT: NORMAL

## 2024-05-21 ENCOUNTER — PATIENT OUTREACH (OUTPATIENT)
Dept: ADMINISTRATIVE | Facility: CLINIC | Age: 79
End: 2024-05-21
Payer: MEDICARE

## 2024-05-21 NOTE — PT/OT/SLP DISCHARGE
POST DISCHARGE DOCUMENTATION - 05/21/2024 9:17 AM    Physical Therapy Discharge Summary    Name: Charlotte Choi  MRN: [id number]   Principal Problem: Diverticulitis   1. Diverticulitis large intestine w/o perforation or abscess w/o bleeding    2. Diverticulitis       Patient Active Problem List   Diagnosis    Hypertension    Spinal stenosis of lumbar region    Type 2 diabetes mellitus    Hyperlipidemia    IVORY (acute kidney injury)    Sepsis due to pneumonia    Moderate malnutrition    Diverticulitis      Recommendations - per last treatment session     Therapy Intensity Recommendations at Discharge: Low Intensity Therapy  Discharge Equipment Recommendations: rollator, shower chair     Assessment:     Refer to prior Physical Therapy note dated 05/17/2024 (EVALUATION) for last known functional status of patient.    Patient was unexpectedly discharged from hospital.    Refer to therapy's initial evaluation or last treatment note for patient's most recent functional status and goal achievement and therapists' recommendations.    Patient was seen by therapy for evaluation only prior to hospital discharge.    -continued: up-to-chair, ambulation, with progression of gait distance/frequency/duration and speed, as tolerated/appropriate, with assistance and supervision  (AS ORDERED BY M.D.)    Objective     GOALS: 0 OUT OF 6 STG's met by patient 2/2 patient seen for evaluation only prior to hospital discharge    Multidisciplinary Problems       Physical Therapy Goals       Not on file         Multidisciplinary Problems (Resolved)       Problem: Physical Therapy    Goal Priority Disciplines Outcome Goal Variances Interventions   Physical Therapy Goal   (Resolved)     PT, PT/OT Met     Description: ESTABLISHED 05/17/2024  Goals to be met by: DISCHARGE  Patient will increase functional independence with mobility by performing:  -. Supine to sit with Modified [place] - ONGOING  -. Sit to supine with Modified [place] -  ONGOING  -. Rolling to Left and Right with [place] - ONGOING  -. Sit to stand transfer with Modified [place] - ONGOING  -. Gait  x 130 feet with Supervision using Rolling Walker - ONGOING  -. Ascend/descend 4 stair with left Handrails Contact Guard Assistance using No Assistive Device - ONGOING           Plan     Patient Discharged to: home or self care per chart.    5/21/2024

## 2024-05-21 NOTE — PROGRESS NOTES
C3 nurse spoke with Charlotte Choi and  nurse for a TCC post hospital discharge follow up call. The patient has a scheduled HOSFU appointment with Pawan Oneil DO 5/29/24 @8:10am

## 2024-05-29 ENCOUNTER — OFFICE VISIT (OUTPATIENT)
Dept: INTERNAL MEDICINE | Facility: CLINIC | Age: 79
End: 2024-05-29
Payer: MEDICARE

## 2024-05-29 VITALS
OXYGEN SATURATION: 98 % | SYSTOLIC BLOOD PRESSURE: 114 MMHG | WEIGHT: 192.25 LBS | RESPIRATION RATE: 19 BRPM | DIASTOLIC BLOOD PRESSURE: 71 MMHG | TEMPERATURE: 99 F | HEART RATE: 71 BPM | BODY MASS INDEX: 32.82 KG/M2 | HEIGHT: 64 IN

## 2024-05-29 DIAGNOSIS — E11.9 TYPE 2 DIABETES MELLITUS WITHOUT COMPLICATION, WITHOUT LONG-TERM CURRENT USE OF INSULIN: ICD-10-CM

## 2024-05-29 DIAGNOSIS — I10 PRIMARY HYPERTENSION: Primary | ICD-10-CM

## 2024-05-29 DIAGNOSIS — K57.92 DIVERTICULITIS: ICD-10-CM

## 2024-05-29 PROCEDURE — 99215 OFFICE O/P EST HI 40 MIN: CPT | Mod: PBBFAC | Performed by: INTERNAL MEDICINE

## 2024-05-29 RX ORDER — METFORMIN HYDROCHLORIDE 500 MG/1
500 TABLET ORAL 2 TIMES DAILY WITH MEALS
Qty: 180 TABLET | Refills: 3 | Status: SHIPPED | OUTPATIENT
Start: 2024-05-29 | End: 2025-05-29

## 2024-05-29 RX ORDER — GABAPENTIN 300 MG/1
300 CAPSULE ORAL 3 TIMES DAILY
Qty: 90 CAPSULE | Refills: 11 | Status: SHIPPED | OUTPATIENT
Start: 2024-05-29 | End: 2025-05-29

## 2024-05-29 RX ORDER — AMLODIPINE BESYLATE 10 MG/1
10 TABLET ORAL DAILY
Qty: 30 TABLET | Refills: 11 | Status: SHIPPED | OUTPATIENT
Start: 2024-05-29 | End: 2025-05-29

## 2024-05-29 RX ORDER — ATORVASTATIN CALCIUM 40 MG/1
40 TABLET, FILM COATED ORAL DAILY
Qty: 90 TABLET | Refills: 3 | Status: SHIPPED | OUTPATIENT
Start: 2024-05-29 | End: 2025-05-29

## 2024-05-29 RX ORDER — METOPROLOL SUCCINATE 50 MG/1
50 TABLET, EXTENDED RELEASE ORAL DAILY
Qty: 30 TABLET | Refills: 11 | Status: SHIPPED | OUTPATIENT
Start: 2024-05-29 | End: 2025-05-29

## 2024-05-29 RX ORDER — LISINOPRIL 20 MG/1
20 TABLET ORAL DAILY
Qty: 90 TABLET | Refills: 3 | Status: SHIPPED | OUTPATIENT
Start: 2024-05-29 | End: 2025-05-29

## 2024-05-29 NOTE — PROGRESS NOTES
Attending Addendum:   Patient seen and examined in clinic. Management and Plan were discussed with resident. Care was reasonable and necessary.   Kaylyn Etienne MD  [facility] Medicine

## 2024-05-29 NOTE — PROGRESS NOTES
[facility]    Chief Complaint  Chief Complaint   Patient presents with    Follow-up     Pt here today for f/u visit. No distress noted at this time.        HPI  Charlotte Choi is a 77 y.o. female who presents to clinic today for follow-up of chronic medical conditions.      Since last visit she had her MRI done. Unfortunately it was unrevealing.   Thereafter, she was hospitalized on 5/15 for diverticulitis and discharged on the 18th.   She is doing better. Good bowel movements. Good appetite.   Still has chronic pain. Unchanged  Had labs done at admission. This was reviewed.   Needs medication refills    ROS  Review of Systems   Constitutional:  Negative for chills and fever.   Respiratory:  Negative for cough and shortness of breath.    Cardiovascular:  Negative for chest pain.   Gastrointestinal:  Negative for abdominal pain, nausea and vomiting.   Genitourinary:  Negative for dysuria, flank pain, frequency and hematuria.   Musculoskeletal:  Negative for back pain and neck pain.   Neurological:  Negative for tingling.      PE  Vitals:    05/29/24 0824   BP: 114/71   Pulse: 71   Resp: 19   Temp: 98.5 °F (36.9 °C)         Physical Exam  Vitals and nursing note reviewed.   Constitutional:       General: She is not in acute distress.     Appearance: Normal appearance. She is normal weight. She is not ill-appearing or toxic-appearing.   HENT:      Head: Normocephalic and atraumatic.   Eyes:      Extraocular Movements: Extraocular movements intact.      Conjunctiva/sclera: Conjunctivae normal.      Pupils: Pupils are equal, round, and reactive to light.   Cardiovascular:      Rate and Rhythm: Normal rate and regular rhythm.      Pulses: Normal pulses.      Heart sounds: Normal heart sounds. No murmur heard.     No gallop.   Pulmonary:      Effort: Pulmonary effort is normal. No respiratory distress.      Breath sounds: Normal breath sounds. No stridor. No wheezing, rhonchi or rales.   Musculoskeletal:         General:  No swelling.      Right lower leg: No edema.      Left lower leg: No edema.   Skin:     General: Skin is warm and dry.   Neurological:      General: No focal deficit present.      Mental Status: She is alert and oriented to person, place, and time.       Assessment/Plan  Type 2 Diabetes with retinopathy  A1c 6.7  Continue Metformin   Followed by CIELO cohen    Right Sided Facial numbness  Normal MRI    Primary Hypertension  Blood pressure 114/71  Continue Norvasc, lisinopril, HCTZ-triamterene, and metoprolol    Neck pain/Shoulder pain  Lumbar Stenosis with neurogenic claudication  Referred to pain medicine. Patient told to call for appointment    Hyperlipidemia  Continue Lipitor 40    Recurrent Diverticulitis  At hospital stay it was recommended patient get C-scope 6 weeks after recent flare      Pawan Oneil DO  Internal Medicine - PGY-3

## 2024-06-11 ENCOUNTER — TELEPHONE (OUTPATIENT)
Dept: INTERNAL MEDICINE | Facility: CLINIC | Age: 79
End: 2024-06-11
Payer: MEDICARE

## 2024-06-11 NOTE — TELEPHONE ENCOUNTER
Pt called asking if the nurse can give her a call on a matter of insurance paperwork she left on her last apt

## 2024-07-12 ENCOUNTER — EXTERNAL HOME HEALTH (OUTPATIENT)
Dept: HOME HEALTH SERVICES | Facility: HOSPITAL | Age: 79
End: 2024-07-12
Payer: MEDICARE

## 2024-07-15 ENCOUNTER — DOCUMENT SCAN (OUTPATIENT)
Dept: HOME HEALTH SERVICES | Facility: HOSPITAL | Age: 79
End: 2024-07-15
Payer: MEDICARE

## 2024-08-05 PROBLEM — J18.9 SEPSIS DUE TO PNEUMONIA: Status: RESOLVED | Noted: 2024-05-01 | Resolved: 2024-08-05

## 2024-08-05 PROBLEM — A41.9 SEPSIS DUE TO PNEUMONIA: Status: RESOLVED | Noted: 2024-05-01 | Resolved: 2024-08-05

## 2024-08-05 PROBLEM — N17.9 AKI (ACUTE KIDNEY INJURY): Status: RESOLVED | Noted: 2024-05-01 | Resolved: 2024-08-05

## 2024-09-17 ENCOUNTER — OFFICE VISIT (OUTPATIENT)
Dept: INTERNAL MEDICINE | Facility: CLINIC | Age: 79
End: 2024-09-17
Payer: MEDICARE

## 2024-09-17 VITALS
TEMPERATURE: 98 F | BODY MASS INDEX: 33.8 KG/M2 | SYSTOLIC BLOOD PRESSURE: 168 MMHG | OXYGEN SATURATION: 99 % | HEIGHT: 64 IN | HEART RATE: 74 BPM | WEIGHT: 198 LBS | RESPIRATION RATE: 19 BRPM | DIASTOLIC BLOOD PRESSURE: 80 MMHG

## 2024-09-17 DIAGNOSIS — I10 PRIMARY HYPERTENSION: ICD-10-CM

## 2024-09-17 DIAGNOSIS — M48.062 SPINAL STENOSIS OF LUMBAR REGION WITH NEUROGENIC CLAUDICATION: Primary | ICD-10-CM

## 2024-09-17 DIAGNOSIS — E11.9 TYPE 2 DIABETES MELLITUS WITHOUT COMPLICATION, WITHOUT LONG-TERM CURRENT USE OF INSULIN: ICD-10-CM

## 2024-09-17 DIAGNOSIS — M48.061 SPINAL STENOSIS OF LUMBAR REGION WITHOUT NEUROGENIC CLAUDICATION: ICD-10-CM

## 2024-09-17 PROCEDURE — 90653 IIV ADJUVANT VACCINE IM: CPT | Mod: PBBFAC

## 2024-09-17 PROCEDURE — 99215 OFFICE O/P EST HI 40 MIN: CPT | Mod: PBBFAC,25 | Performed by: INTERNAL MEDICINE

## 2024-09-17 PROCEDURE — G0008 ADMIN INFLUENZA VIRUS VAC: HCPCS | Mod: PBBFAC

## 2024-09-17 RX ORDER — METFORMIN HYDROCHLORIDE 500 MG/1
500 TABLET ORAL 2 TIMES DAILY WITH MEALS
Qty: 180 TABLET | Refills: 3 | Status: SHIPPED | OUTPATIENT
Start: 2024-09-17 | End: 2025-09-17

## 2024-09-17 RX ORDER — ATORVASTATIN CALCIUM 40 MG/1
40 TABLET, FILM COATED ORAL DAILY
Qty: 90 TABLET | Refills: 3 | Status: CANCELLED | OUTPATIENT
Start: 2024-09-17 | End: 2025-09-17

## 2024-09-17 RX ORDER — LISINOPRIL 20 MG/1
20 TABLET ORAL DAILY
Qty: 90 TABLET | Refills: 3 | Status: SHIPPED | OUTPATIENT
Start: 2024-09-17 | End: 2025-09-17

## 2024-09-17 RX ADMIN — INFLUENZA A VIRUS A/VICTORIA/4897/2022 IVR-238 (H1N1) ANTIGEN (FORMALDEHYDE INACTIVATED), INFLUENZA A VIRUS A/THAILAND/8/2022 IVR-237 (H3N2) ANTIGEN (FORMALDEHYDE INACTIVATED), INFLUENZA B VIRUS B/AUSTRIA/1359417/2021 BVR-26 ANTIGEN (FORMALDEHYDE INACTIVATED) 0.5 ML: 15; 15; 15 INJECTION, SUSPENSION INTRAMUSCULAR at 03:09

## 2024-09-17 NOTE — PROGRESS NOTES
[facility]    Chief Complaint  Chief Complaint   Patient presents with    Follow-up     Pt here today for f/u visit. C/o dizziness noted at times. Occurs when changing position. Pt also b/l swelling to both legs , also c/o pain and swelling to both . .        HPI  Charlotte Choi is a 77 y.o. female who presents to clinic today for follow-up of chronic medical conditions.    Patient had Blood pressure 165/70 in clinic, On questioning, pt said she was not taking BP pills regularly and last intake was 3 days ago. She was scared of having dizziness episodes. Advised her to maintain a BP log and asked her to take 3 BP meds at 3 different times. Patient also complained of b/l palmar paresthesias, and left leg pain radiating to back. Referral to pain medicine for the same. Patient also worried about pedal edema +. Explained her the role of diuretics to reduce it.     Needs medication refills    ROS  Review of Systems   Constitutional:  Negative for chills and fever.   HENT:  Negative for congestion, ear pain and nosebleeds.    Eyes:  Negative for pain and discharge.   Respiratory:  Negative for cough, sputum production and shortness of breath.    Cardiovascular:  Positive for leg swelling. Negative for chest pain, orthopnea and claudication.   Gastrointestinal:  Negative for abdominal pain, constipation, diarrhea, nausea and vomiting.   Genitourinary:  Negative for dysuria, flank pain, frequency and hematuria.   Musculoskeletal:  Positive for joint pain and myalgias. Negative for back pain and neck pain.   Neurological:  Positive for tingling. Negative for speech change and focal weakness.      PE  Vitals:    09/17/24 1518   BP: (!) 168/80   Pulse:    Resp:    Temp:        Vital signs and nursing notes reviewed.  Constitutional: NAD. Awake and alert.   Head: Atraumatic. Normocephalic.  Eyes: Conjunctivae nl. No scleral icterus.  ENT: Mucous membranes are moist. Oropharynx is clear.  Neck: Supple. Full ROM. No  lymphadenopathy.  Cardiovascular: Regular rate and rhythm. No murmurs, rubs, or gallops. Distal pulses are 2+ and symmetric.  Pulmonary/Chest: No respiratory distress. Clear to auscultation bilaterally. No wheezing, rales, or rhonchi.  Abdominal: Soft. Non-distended. No TTP. No rebound, guarding, or rigidity.   Musculoskeletal: Moves all extremities. B/L pedal edema 1+. SLRT +VE left side.  Skin: Warm and dry.  Neurological: Awake and alert. No acute focal neurological deficits are appreciated.       Assessment/Plan  Type 2 Diabetes with retinopathy  A1c 6.7  Repeat A1c before next visit  Continue Metformin   Followed by CIELO cohen    Bilateral palmar paresthesias  A1c 6.7  Repeat A1c before next visit  Recent MRI brain 1/10/24 - no signs of ischemia  Mostly 2/2 vitamin deficiency  Advise to take Otc MVT  Will re evaluate in next visit.    B/l pedal edema  - 1+ pitting type, noticed since pt was irregular with BP pills.  -  creat - 0.83, labs non indicative of pedal edema  - willreevaluate in next visit after patient being complaint with all the medications    Primary Hypertension  Blood pressure 165/70  Patient is non compliant with her BP pills and last intake was 3 days ago.  Advised importance of compliance and explained her how to check her Blood pressure at home and how to keep a BP log.  Continue Norvasc, lisinopril, HCTZ-triamterene  Advised to avoid norvasc in the evening, if BP is low.    Neck pain/Shoulder pain  Lumbar Stenosis with neurogenic claudication  Chronic symptoms of b/l LE radiating pain (lt>rt), sciatica like picture  SLRT +VE left side,  Referred to pain medicine.     Hyperlipidemia  Continue Lipitor 40  Repeat lipid panel before next visit.    Flu shot today    Hilda Pickard MD  Internal Medicine - PGY-1

## 2024-09-18 NOTE — PROGRESS NOTES
I have reviewed and concur with the resident's history, physical, assessment, and plan.  I have discussed with him all issues related to the diagnosis, workup and treatment plan. Care provided as reasonable and necessary.DM. welll controlled. Lumbar DJD stable    Artem Camargo MD  [facility]

## 2024-09-18 NOTE — PROGRESS NOTES
I have reviewed and concur with the resident's history, physical, assessment, and plan.  I have discussed with him all issues related to the diagnosis, workup and treatment plan. Care provided as reasonable and necessary.   Already signed. Duplicate note  Jay Jaikishen, MD [facility]

## 2024-10-05 RX ORDER — DULOXETIN HYDROCHLORIDE 20 MG/1
20 CAPSULE, DELAYED RELEASE ORAL DAILY
Qty: 30 CAPSULE | Refills: 11 | Status: SHIPPED | OUTPATIENT
Start: 2024-10-05 | End: 2025-10-05

## 2024-12-05 ENCOUNTER — LAB VISIT (OUTPATIENT)
Dept: LAB | Facility: HOSPITAL | Age: 79
End: 2024-12-05
Attending: INTERNAL MEDICINE
Payer: MEDICARE

## 2024-12-05 DIAGNOSIS — E11.9 TYPE 2 DIABETES MELLITUS WITHOUT COMPLICATION, WITHOUT LONG-TERM CURRENT USE OF INSULIN: ICD-10-CM

## 2024-12-05 LAB
CHOLEST SERPL-MCNC: 206 MG/DL
CHOLEST/HDLC SERPL: 5 {RATIO} (ref 0–5)
EST. AVERAGE GLUCOSE BLD GHB EST-MCNC: 148.5 MG/DL
HBA1C MFR BLD: 6.8 %
HDLC SERPL-MCNC: 39 MG/DL (ref 35–60)
LDLC SERPL CALC-MCNC: 142 MG/DL (ref 50–140)
TRIGL SERPL-MCNC: 127 MG/DL (ref 37–140)
VLDLC SERPL CALC-MCNC: 25 MG/DL

## 2024-12-05 PROCEDURE — 80061 LIPID PANEL: CPT

## 2024-12-05 PROCEDURE — 36415 COLL VENOUS BLD VENIPUNCTURE: CPT

## 2024-12-05 PROCEDURE — 83036 HEMOGLOBIN GLYCOSYLATED A1C: CPT

## 2024-12-10 ENCOUNTER — OFFICE VISIT (OUTPATIENT)
Dept: INTERNAL MEDICINE | Facility: CLINIC | Age: 79
End: 2024-12-10
Payer: MEDICARE

## 2024-12-10 ENCOUNTER — APPOINTMENT (OUTPATIENT)
Dept: LAB | Facility: HOSPITAL | Age: 79
End: 2024-12-10
Attending: INTERNAL MEDICINE
Payer: MEDICARE

## 2024-12-10 VITALS
BODY MASS INDEX: 34.15 KG/M2 | WEIGHT: 200 LBS | RESPIRATION RATE: 19 BRPM | DIASTOLIC BLOOD PRESSURE: 90 MMHG | SYSTOLIC BLOOD PRESSURE: 192 MMHG | HEIGHT: 64 IN | HEART RATE: 73 BPM | TEMPERATURE: 99 F | OXYGEN SATURATION: 97 %

## 2024-12-10 DIAGNOSIS — M48.061 SPINAL STENOSIS OF LUMBAR REGION WITHOUT NEUROGENIC CLAUDICATION: ICD-10-CM

## 2024-12-10 DIAGNOSIS — N90.4 LICHEN SCLEROSUS OF VULVA: Primary | ICD-10-CM

## 2024-12-10 DIAGNOSIS — M81.0 AGE-RELATED OSTEOPOROSIS WITHOUT CURRENT PATHOLOGICAL FRACTURE: ICD-10-CM

## 2024-12-10 DIAGNOSIS — R30.0 DYSURIA: ICD-10-CM

## 2024-12-10 DIAGNOSIS — E11.9 TYPE 2 DIABETES MELLITUS WITHOUT COMPLICATION, WITHOUT LONG-TERM CURRENT USE OF INSULIN: Primary | ICD-10-CM

## 2024-12-10 DIAGNOSIS — I10 PRIMARY HYPERTENSION: ICD-10-CM

## 2024-12-10 DIAGNOSIS — N39.0 URINARY TRACT INFECTION WITHOUT HEMATURIA, SITE UNSPECIFIED: ICD-10-CM

## 2024-12-10 LAB
BACTERIA #/AREA URNS AUTO: ABNORMAL /HPF
BILIRUB UR QL STRIP.AUTO: NEGATIVE
CLARITY UR: CLEAR
COLOR UR AUTO: ABNORMAL
GLUCOSE UR QL STRIP: NORMAL
HGB UR QL STRIP: NEGATIVE
HYALINE CASTS #/AREA URNS LPF: ABNORMAL /LPF
KETONES UR QL STRIP: NEGATIVE
LEUKOCYTE ESTERASE UR QL STRIP: NEGATIVE
MUCOUS THREADS URNS QL MICRO: ABNORMAL /LPF
NITRITE UR QL STRIP: ABNORMAL
PH UR STRIP: 6 [PH]
PROT UR QL STRIP: NEGATIVE
RBC #/AREA URNS AUTO: ABNORMAL /HPF
SP GR UR STRIP.AUTO: 1.02 (ref 1–1.03)
SQUAMOUS #/AREA URNS LPF: ABNORMAL /HPF
UROBILINOGEN UR STRIP-ACNC: ABNORMAL
WBC #/AREA URNS AUTO: ABNORMAL /HPF

## 2024-12-10 PROCEDURE — 81001 URINALYSIS AUTO W/SCOPE: CPT | Performed by: INTERNAL MEDICINE

## 2024-12-10 PROCEDURE — 99215 OFFICE O/P EST HI 40 MIN: CPT | Mod: PBBFAC | Performed by: INTERNAL MEDICINE

## 2024-12-10 RX ORDER — LISINOPRIL 20 MG/1
40 TABLET ORAL DAILY
Qty: 180 TABLET | Refills: 3 | Status: SHIPPED | OUTPATIENT
Start: 2024-12-10 | End: 2025-12-10

## 2024-12-10 RX ORDER — CARVEDILOL 3.12 MG/1
3.12 TABLET ORAL 2 TIMES DAILY WITH MEALS
Qty: 180 TABLET | Refills: 3 | Status: SHIPPED | OUTPATIENT
Start: 2024-12-10 | End: 2025-12-10

## 2024-12-10 RX ORDER — AMLODIPINE BESYLATE 10 MG/1
10 TABLET ORAL DAILY
Qty: 30 TABLET | Refills: 11 | Status: SHIPPED | OUTPATIENT
Start: 2024-12-10 | End: 2025-12-10

## 2024-12-10 RX ORDER — NITROFURANTOIN 25; 75 MG/1; MG/1
100 CAPSULE ORAL 2 TIMES DAILY
Qty: 10 CAPSULE | Refills: 0 | Status: SHIPPED | OUTPATIENT
Start: 2024-12-10 | End: 2024-12-15

## 2024-12-10 RX ORDER — CLOBETASOL PROPIONATE 0.5 MG/G
OINTMENT TOPICAL NIGHTLY
Qty: 30 G | Refills: 1 | Status: SHIPPED | OUTPATIENT
Start: 2024-12-10 | End: 2025-03-10

## 2024-12-10 NOTE — PROGRESS NOTES
I saw and evaluated the patient and was present for the key portions of the exam and separately billed procedures, if any. I have reveiwed and agree with the resident's findings, including all diagnostic interpretations and plans as written.    Atrophy of labia (architecture of labia was essentially completely gone) is appreciated along with whitened discoloration. Severity of findings was between moderate-advanced. Prescribing clobetasol ointment 0.05% nightly x12 weeks after which we will re-evaluate and see if she needs maintenance therapy which could be the same ointment 2-3 nights/week. Referring to Gyn. Also has 1 month of dysuria which can be a symptoms of lichen sclerosis, but we ordered UA to check for UTI. It shows positive nitrites and trace bacteria - we are prescribing macrobid    Also with regards to her BP, we are adding Coreg today and increasing her lisinopril from 20 to 40mg daily. Right now she is on two diuretics. Hopefully in the future we can come down from 50mg to 25mg on her HCTZ and possibly d/c the triamterine. Return for nurse visit BP check in 2 weeks with BP logs.

## 2024-12-10 NOTE — PROGRESS NOTES
[facility]    Chief Complaint  Chief Complaint   Patient presents with    Follow-up     Pt here today for f/u visit. Pt c/o symptoms of possible urinary tract infections. Also  requesting refill on Voltaren cream for pain.         HPI  Charlotte Choi is a 77 y.o. female who presents to clinic today for follow-up of chronic medical conditions.    Patient had Blood pressure 186/102 in clinic, orthostatics standing 192/90 sitting 172/82 supine 178/98. On questioning, pt said she was not taking BP pills regularly missing afternoon pills sometimes. She has been noting BP log, but forgot to bring to clinic, reports her BP between 130 to 170. Denies dizziness episodes.reports vulvar itchiness, dysuria and requests for triamcinolone ointment, as she has been having these symptoms since many years and that ointments helps generally. Patient neither seen by a gynecologist nor examined at pelvis by any doctor. Took consent to exam, in the presence of female nurse, patient agrees. Patient reports foul smelling urine is a new complaint, since 1 month, ordered urine analysis today. Patient also requests for a roller walker as her current walker brakes are not working.    ROS  Review of Systems   Constitutional:  Negative for chills and fever.   HENT:  Negative for congestion, ear pain and nosebleeds.    Eyes:  Negative for pain and discharge.   Respiratory:  Negative for cough, sputum production and shortness of breath.    Cardiovascular:  Negative for chest pain, orthopnea and claudication.   Gastrointestinal:  Negative for abdominal pain, constipation, diarrhea, nausea and vomiting.   Genitourinary:  Positive for dysuria. Negative for flank pain, frequency and hematuria.   Musculoskeletal:  Positive for joint pain. Negative for back pain and neck pain.   Neurological:  Positive for tingling. Negative for speech change and focal weakness.      PE  Vitals:    12/10/24 1312   BP: (!) 192/90   Pulse:    Resp:    Temp:           Vital signs and nursing notes reviewed.  Constitutional: NAD. Awake and alert.   Head: Atraumatic. Normocephalic.  Eyes: Conjunctivae nl. No scleral icterus.  ENT: Mucous membranes are moist. Oropharynx is clear.  Neck: Supple. Full ROM. No lymphadenopathy.  Cardiovascular: Regular rate and rhythm. No murmurs, rubs, or gallops. Distal pulses are 2+ and symmetric.  Pulmonary/Chest: No respiratory distress. Clear to auscultation bilaterally. No wheezing, rales, or rhonchi.  Abdominal: Soft. Non-distended. No TTP. No rebound, guarding, or rigidity.   Musculoskeletal: Moves all extremities. B/L trace pedal edema.   Skin: Warm and dry.  Neurological: Awake and alert. No acute focal neurological deficits are appreciated.   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 no masses, tenderness or lesions,    Assessment/Plan    Lichen sclerosis  UTI  Patient reports dysuria, foul-smelling urine, vulvar itchiness.  Patient has been having this vulvar pruritus since many years and has been using triamcinolone ointment intermittently with relief, but never seen by a gynecologist.  Patient reports nobody ever did a pelvic exam on her.  Patient reports these symptoms occur intermittently, except foul-smelling urine (newly developed since 1 month)  Ordered urinalysis, trace bacteria with 2+ nitrites, ordered macrobid 100 mg BID for 5 days  Ordered clobetasol 0.05% ointment nightly daily for 12 weeks.  Place referral to a gynecologist to evaluate the patient's condition.    Type 2 Diabetes with retinopathy  A1c 6.8 12/5/24  Continue Metformin   Followed by CIELO cohen    Bilateral palmar paresthesias  A1c 6.8  Recent MRI brain 1/10/24 - no signs of ischemia  Mostly 2/2 vitamin deficiency  Advise to take Otc MVT    B/l pedal edema  1+ pitting type, noticed since pt was irregular with BP pills.   creat - 0.83, labs non indicative of pedal edema    Primary  Hypertension  Blood pressure 186/102, orthostatics positive.  Patient denies dizziness episodes in these 3 months.  Patient is non compliant with her BP pills, Advised importance of compliance  Continue Norvasc, lisinopril, HCTZ-triamterene  Added coreg BID and increased lisinopril to 40 mg daily  Advised to avoid norvasc in the evening, if BP is low.    Neck pain/Shoulder pain  Lumbar Stenosis with neurogenic claudication  Chronic symptoms of b/l LE radiating pain (lt>rt), sciatica like picture  SLRT +VE left side,  Referred to pain medicine during previous visit, patient declined to visit as she don't want a shot.  Ordered roller walker for home use. OTC tylenol prn    Hyperlipidemia  Total cholesterol 206,   Patient reports poor adherence, emphasised the importance of adherence  Continue Lipitor 40    RTC for nurse visit in 4 weeks- to vitals check along with BP log    RTC 3 months    Hilda Pickard MD  Internal Medicine - PGY-1

## 2024-12-13 RX ORDER — TRIAMCINOLONE ACETONIDE 1 MG/G
1 CREAM TOPICAL 2 TIMES DAILY
Qty: 30 G | Refills: 4 | Status: SHIPPED | OUTPATIENT
Start: 2024-12-13

## 2024-12-19 ENCOUNTER — TELEPHONE (OUTPATIENT)
Dept: INTERNAL MEDICINE | Facility: CLINIC | Age: 79
End: 2024-12-19
Payer: MEDICARE

## 2024-12-19 DIAGNOSIS — M81.0 AGE-RELATED OSTEOPOROSIS WITHOUT CURRENT PATHOLOGICAL FRACTURE: Primary | ICD-10-CM

## 2024-12-19 DIAGNOSIS — M48.062 SPINAL STENOSIS OF LUMBAR REGION WITH NEUROGENIC CLAUDICATION: ICD-10-CM

## 2024-12-19 NOTE — TELEPHONE ENCOUNTER
Pt called inquiring about the order for her walker. However after speaking to patient it was discovered that the walker / Rollator she has doesn't belong to her and its breaking . She would like to have a rollator ordered for use. Inform patient that a message would be sent to the PCP.

## 2024-12-26 ENCOUNTER — TELEPHONE (OUTPATIENT)
Dept: GYNECOLOGY | Facility: CLINIC | Age: 79
End: 2024-12-26
Payer: MEDICARE

## 2024-12-26 NOTE — TELEPHONE ENCOUNTER
Good morning,     I was review the referrals and noticed this patient has not been scheduled with us (the referral was not routed to us correctly). Please advise on scheduling. Thank you!

## 2025-01-08 ENCOUNTER — CLINICAL SUPPORT (OUTPATIENT)
Dept: INTERNAL MEDICINE | Facility: CLINIC | Age: 80
End: 2025-01-08
Payer: MEDICARE

## 2025-01-08 DIAGNOSIS — I10 PRIMARY HYPERTENSION: Primary | ICD-10-CM

## 2025-01-08 NOTE — PROGRESS NOTES
Charlotte Choi 79 y.o. female is here today for Blood Pressure check.   History of HTN yes.    Review of patient's allergies indicates:   Allergen Reactions    Iodine Swelling     Creatinine   Date Value Ref Range Status   2024 0.83 0.55 - 1.02 mg/dL Final     Sodium   Date Value Ref Range Status   2024 139 136 - 145 mmol/L Final     Potassium   Date Value Ref Range Status   2024 3.9 3.5 - 5.1 mmol/L Final   ]  Patient verifies taking blood pressure medications on a regular basis at the same time of the day.     Current Outpatient Medications:     albuterol (PROAIR HFA) 90 mcg/actuation inhaler, Inhale 2 puffs into the lungs every 6 (six) hours as needed for Wheezing. Rescue, Disp: 18 g, Rfl: 0    albuterol (PROVENTIL HFA) 90 mcg/actuation inhaler, Inhale 2 puffs into the lungs every 6 (six) hours as needed for Shortness of Breath. Rescue, Disp: 18 g, Rfl: 2    amLODIPine (NORVASC) 10 MG tablet, Take 1 tablet (10 mg total) by mouth once daily., Disp: 30 tablet, Rfl: 11    atorvastatin (LIPITOR) 40 MG tablet, Take 1 tablet (40 mg total) by mouth once daily., Disp: 90 tablet, Rfl: 3    carvediloL (COREG) 3.125 MG tablet, Take 1 tablet (3.125 mg total) by mouth 2 (two) times daily with meals., Disp: 180 tablet, Rfl: 3    clobetasol 0.05% (TEMOVATE) 0.05 % Oint, Apply topically every evening., Disp: 30 g, Rfl: 1    DULoxetine (CYMBALTA) 20 MG capsule, Take 1 capsule (20 mg total) by mouth once daily., Disp: 30 capsule, Rfl: 11    fluticasone propionate (FLONASE) 50 mcg/actuation nasal spray, 1 spray (50 mcg total) by Each Nostril route once daily., Disp: 16 g, Rfl: 0    gabapentin (NEURONTIN) 300 MG capsule, Take 1 capsule (300 mg total) by mouth 3 (three) times daily., Disp: 90 capsule, Rfl: 11    ibuprofen (ADVIL,MOTRIN) 600 MG tablet, SMARTSI Tablet(s) By Mouth Every 12 Hours PRN, Disp: , Rfl:     latanoprost 0.005 % ophthalmic solution, Place 1 drop into both eyes once daily., Disp: , Rfl:      lisinopriL (PRINIVIL,ZESTRIL) 20 MG tablet, Take 2 tablets (40 mg total) by mouth once daily., Disp: 180 tablet, Rfl: 3    metFORMIN (GLUCOPHAGE) 500 MG tablet, Take 1 tablet (500 mg total) by mouth 2 (two) times daily with meals., Disp: 180 tablet, Rfl: 3    triamcinolone acetonide 0.1% (KENALOG) 0.1 % cream, Apply topically 2 (two) times daily., Disp: 15 g, Rfl: 1    triamcinolone acetonide 0.1% (KENALOG) 0.1 % cream, Apply 1 g (1,000 mg total) topically 2 (two) times daily., Disp: 30 g, Rfl: 4    triamterene-hydrochlorothiazide 75-50 mg (MAXZIDE) 75-50 mg per tablet, Take 1 tablet by mouth once daily., Disp: 30 tablet, Rfl: 4  Does patient have record of home blood pressure readings yes. Readings have been averaging 126-148 systolic and 63/78 diastolic ranges.   Last dose of blood pressure medication was taken at 8 a.m.. .  Patient is asymptomatic.   None of noted at this time. .      ,   .    Blood pressure reading after 15 minutes was 144/68  Pulse 77.  Dr. Renetta Garza  notified.

## 2025-02-05 RX ORDER — FLUTICASONE PROPIONATE 50 MCG
1 SPRAY, SUSPENSION (ML) NASAL DAILY
Qty: 16 G | Refills: 0 | Status: SHIPPED | OUTPATIENT
Start: 2025-02-05

## 2025-03-12 ENCOUNTER — OFFICE VISIT (OUTPATIENT)
Dept: GYNECOLOGY | Facility: CLINIC | Age: 80
End: 2025-03-12
Payer: MEDICARE

## 2025-03-12 VITALS
OXYGEN SATURATION: 98 % | WEIGHT: 198.63 LBS | HEART RATE: 63 BPM | SYSTOLIC BLOOD PRESSURE: 127 MMHG | HEIGHT: 64 IN | TEMPERATURE: 98 F | BODY MASS INDEX: 33.91 KG/M2 | DIASTOLIC BLOOD PRESSURE: 76 MMHG

## 2025-03-12 DIAGNOSIS — N90.4 LICHEN SCLEROSUS OF VULVA: ICD-10-CM

## 2025-03-12 DIAGNOSIS — L90.0 LICHEN SCLEROSUS: Primary | ICD-10-CM

## 2025-03-12 PROCEDURE — 99214 OFFICE O/P EST MOD 30 MIN: CPT | Mod: PBBFAC

## 2025-03-12 NOTE — ASSESSMENT & PLAN NOTE
Physical exam with thinning of skin, hourglass sign. No areas of thickening, bleeding or ulcerations.   Continue clobetasol oitment weekly for maintenance  Discussed increasing frequency if she has a flare  Discussed alarm symptoms such as areas of thickening, painful lesions, ulcerations.     RTC 6 months for repeat vulvar exams.

## 2025-03-12 NOTE — PROGRESS NOTES
U OB/GYN CLINIC NOTE  [facility]  [address]  [place] [address]  Phone: [phone]  Fax: [phone]    Subjective:     Charlotte Choi is a 79 y.o.  who presents  for lichen sclerosis    H/o vulvar itching, sometimes itches so much that she causes bleeding  Has been using clobetasol with improvement of symptoms. Currently not using regularly, however uses about once a month when her symptoms flare  Notes two small bumps on left labia majora, not painful, but have been there for many years  No areas of thickening, painful lesions or ulcerations     OBHx:  OB History    Para Term  AB Living   5 5       SAB IAB Ectopic Multiple Live Births             # Outcome Date GA Lbr Tyler/2nd Weight Sex Type Anes PTL Lv   5 Para            4 Para            3 Para            2 Para            1 Para                GynHx:    Denies h/o STI, abnormal pap  H/o hysterectomy     MedHx:   Past Medical History:   Diagnosis Date    Diabetes mellitus     Hypertension        SurgHx:   Past Surgical History:   Procedure Laterality Date    HYSTERECTOMY      MAGNETIC RESONANCE IMAGING N/A 3/15/2024    Procedure: MRI (Magnetic Resonance Imagine);  Surgeon: Susanne Ellington MD;  Location: [facility];  Service: Anesthesiology;  Laterality: N/A;  MRI BRAIN W W/O CONTRAST WITH ANESTHESIA       Medications:   Current Outpatient Medications   Medication Instructions    albuterol (PROAIR HFA) 90 mcg/actuation inhaler 2 puffs, Inhalation, Every 6 hours PRN, Rescue    albuterol (PROVENTIL HFA) 90 mcg/actuation inhaler 2 puffs, Inhalation, Every 6 hours PRN, Rescue    amLODIPine (NORVASC) 10 mg, Oral, Daily    atorvastatin (LIPITOR) 40 mg, Oral, Daily    carvediloL (COREG) 3.125 mg, Oral, 2 times daily with meals    clobetasol 0.05% (TEMOVATE) 0.05 % Oint Topical (Top), Nightly    DULoxetine (CYMBALTA) 20 mg, Oral, Daily    fluticasone propionate (FLONASE) 50 mcg, Each Nostril, Daily    gabapentin (NEURONTIN) 300 mg, Oral, 3  "times daily    ibuprofen (ADVIL,MOTRIN) 600 MG tablet SMARTSI Tablet(s) By Mouth Every 12 Hours PRN    latanoprost 0.005 % ophthalmic solution 1 drop, Daily    lisinopriL (PRINIVIL,ZESTRIL) 40 mg, Oral, Daily    metFORMIN (GLUCOPHAGE) 500 mg, Oral, 2 times daily with meals    triamcinolone acetonide 0.1% (KENALOG) 0.1 % cream Topical (Top), 2 times daily    triamcinolone acetonide 0.1% (KENALOG) 1,000 mg, Topical (Top), 2 times daily    triamterene-hydrochlorothiazide 75-50 mg (MAXZIDE) 75-50 mg per tablet 1 tablet, Oral, Daily       FM Hx:   Family History   Problem Relation Name Age of Onset    Diabetes Mother      Hypertension Mother      Intellectual disability Mother      Cancer Father      Mental illness Sister      Hypertension Sister      Diabetes Brother      Stroke Brother        Denies hx of ovarian, uterine, endometrial, or colon cancer.    Social Hx:   Social History[1]   Denies tobacco, alcohol and illicit drug usage.    Review of Systems  Denies fevers, chills, headache, blurry vision, nausea, vomiting, dizziness, or syncope.Denies chest pain, shortness of breath, RUQ pain, or calf pain.    Objective:     Vitals:    25 1050   BP: 127/76   Pulse: 63   Temp: 98 °F (36.7 °C)   SpO2: 98%   Weight: 90.1 kg (198 lb 9.6 oz)   Height: 5' 4" (1.626 m)     Body mass index is 34.09 kg/m².    Physical Exam:     General: alert and oriented, in no acute distress  Lungs: clear to auscultation bilaterally, no conversational dyspnea  Heart: RRR  Abdomen: Soft, non-distended, non tender to palpation, no involuntary guarding, no rebound tenderness normoactive bowel sounds  Extremities: Normal, atraumatic, non-edematous, non-tender, bilaterally   External genitalia: External female genitalia with atrophic changes, loss of architecture consistent with postmenopause and lichen sclerosus. Hypopigmentation in hourglass configuartion visualized. No lesions, ulcerations, bleeding. Normal appearing urethral meatus. " Normal appearing external anus. Two small inclusion cysts on inner left labia majora, nontender    Note: RN chaperone present for entirety of exam.    Labs  No results found for this or any previous visit (from the past 24 hours).    Imaging  No images are attached to the encounter.      Assessment:   79 y.o.  here for lichen sclerosis    Plan:     Problem List Items Addressed This Visit       Lichen sclerosus - Primary    Physical exam with thinning of skin, hourglass sign. No areas of thickening, bleeding or ulcerations.   Continue clobetasol oitment weekly for maintenance  Discussed increasing frequency if she has a flare  Discussed alarm symptoms such as areas of thickening, painful lesions, ulcerations.     RTC 6 months for repeat vulvar exams.          Other Visit Diagnoses         Lichen sclerosus of vulva                Patient and plan were discussed with Dr. Fernández.    Ghazala Brice MD   [facility], PGY-4           [1]   Social History  Tobacco Use    Smoking status: Former    Smokeless tobacco: Former   Substance Use Topics    Alcohol use: Not Currently    Drug use: Not Currently

## 2025-04-01 ENCOUNTER — LAB VISIT (OUTPATIENT)
Dept: LAB | Facility: HOSPITAL | Age: 80
End: 2025-04-01
Attending: INTERNAL MEDICINE
Payer: MEDICARE

## 2025-04-01 ENCOUNTER — OFFICE VISIT (OUTPATIENT)
Dept: INTERNAL MEDICINE | Facility: CLINIC | Age: 80
End: 2025-04-01
Payer: MEDICARE

## 2025-04-01 VITALS
DIASTOLIC BLOOD PRESSURE: 83 MMHG | OXYGEN SATURATION: 97 % | HEIGHT: 64 IN | HEART RATE: 75 BPM | RESPIRATION RATE: 20 BRPM | BODY MASS INDEX: 33.06 KG/M2 | WEIGHT: 193.63 LBS | SYSTOLIC BLOOD PRESSURE: 123 MMHG | TEMPERATURE: 98 F

## 2025-04-01 DIAGNOSIS — M48.062 SPINAL STENOSIS OF LUMBAR REGION WITH NEUROGENIC CLAUDICATION: ICD-10-CM

## 2025-04-01 DIAGNOSIS — E11.9 TYPE 2 DIABETES MELLITUS WITHOUT COMPLICATION, WITHOUT LONG-TERM CURRENT USE OF INSULIN: Primary | ICD-10-CM

## 2025-04-01 DIAGNOSIS — E11.9 TYPE 2 DIABETES MELLITUS WITHOUT COMPLICATION, WITHOUT LONG-TERM CURRENT USE OF INSULIN: ICD-10-CM

## 2025-04-01 DIAGNOSIS — D64.9 ANEMIA, UNSPECIFIED TYPE: ICD-10-CM

## 2025-04-01 LAB
CREAT UR-MCNC: 342.9 MG/DL (ref 45–106)
MICROALBUMIN UR-MCNC: 37.7 UG/ML
MICROALBUMIN/CREAT RATIO PNL UR: 11 MG/GM CR (ref 0–30)

## 2025-04-01 PROCEDURE — 99215 OFFICE O/P EST HI 40 MIN: CPT | Mod: PBBFAC | Performed by: INTERNAL MEDICINE

## 2025-04-01 PROCEDURE — 82570 ASSAY OF URINE CREATININE: CPT

## 2025-04-01 NOTE — PROGRESS NOTES
[facility]    Chief Complaint  Chief Complaint   Patient presents with    Follow-up     Pt here today for f/u visit.Ambulates with walker. States she recently had a fall. States wheel of walker unsteady and got caught in the whole in the ground.        HPI  Charlotte Choi is a 77 y.o. female who presents to clinic today for follow-up of chronic medical conditions.      Patient reports she had an episode of fall due to defective roller walker (Brakes are not walking).  Reports following up with gynecology for lichen sclerosis.  Has been using steroid, once a week and as needed for flare-ups of lichen sclerosis.  Denies dysuria, vaginal itching, burning.  Patient requests for new walker.    ROS  Review of Systems   Constitutional:  Negative for chills and fever.   HENT:  Negative for congestion, ear pain and nosebleeds.    Eyes:  Negative for pain and discharge.   Respiratory:  Negative for cough, sputum production and shortness of breath.    Cardiovascular:  Negative for chest pain, orthopnea and claudication.   Gastrointestinal:  Negative for abdominal pain, constipation, diarrhea, nausea and vomiting.   Genitourinary:  Positive for dysuria. Negative for flank pain, frequency and hematuria.   Musculoskeletal:  Positive for joint pain. Negative for back pain and neck pain.   Neurological:  Positive for tingling. Negative for speech change and focal weakness.      PE  Vitals:    04/01/25 0956   BP: 123/83   Pulse: 75   Resp: 20   Temp: 98.1 °F (36.7 °C)           Vital signs and nursing notes reviewed.  Constitutional: NAD. Awake and alert.   Head: Atraumatic. Normocephalic.  Eyes: Conjunctivae nl. No scleral icterus.  ENT: Mucous membranes are moist. Oropharynx is clear.  Neck: Supple. Full ROM. No lymphadenopathy.  Cardiovascular: Regular rate and rhythm. No murmurs, rubs, or gallops. Distal pulses are 2+ and symmetric.  Pulmonary/Chest: No respiratory distress. Clear to auscultation bilaterally. No wheezing,  rales, or rhonchi.  Abdominal: Soft. Non-distended. No TTP. No rebound, guarding, or rigidity.   Musculoskeletal: Moves all extremities.   Skin: Warm and dry.  Neurological: Awake and alert. No acute focal neurological deficits are appreciated.    no discharge present. no masses, tenderness or lesions,    Assessment/Plan    Lichen sclerosis  Continue to use clobetasol once a week, and as needed for flare-ups  Following up with gynecology    Type 2 Diabetes with retinopathy  A1c 6.8 12/5/24, repeat A1c pending  Continue Metformin   Followed by CIELO cohen    Bilateral palmar paresthesias-resolved  A1c 6.8, repeat A1c pending  Recent MRI brain 1/10/24 - no signs of ischemia  Mostly 2/2 vitamin deficiency  Advise to take Otc MVT      Primary Hypertension  Blood pressure under control  Continue Norvasc, Coreg, lisinopril, HCTZ-triamterene  Advised to avoid norvasc in the evening, if BP is low.    Neck pain/Shoulder pain  Lumbar Stenosis with neurogenic claudication  Chronic symptoms of b/l LE radiating pain (lt>rt), sciatica like picture  SLRT +VE left side,  Referred to pain medicine during previous visit, patient declined to visit as she don't want a shot.  Ordered roller walker for home use.    Hyperlipidemia  Total cholesterol 206,   Patient reports poor adherence, emphasised the importance of adherence  Continue Lipitor 40      RTC 4 months    Hilda Pickard MD  Internal Medicine - PGY-1

## 2025-04-12 NOTE — ANESTHESIA POSTPROCEDURE EVALUATION
Anesthesia Post Evaluation    Patient: Charlotte Choi    Procedure(s) Performed: Procedure(s) (LRB):  MRI (Magnetic Resonance Imagine) (N/A)    Final Anesthesia Type: general (/Regional//MAC)      Patient location during evaluation: PACU  Post-procedure mental status: @ basline.  Pain management: adequate    PONV status: See postop meds for drugs used to control n/v if any.  Anesthetic complications: no      Cardiovascular status: blood pressure returned to baseline  Respiratory status: @ baseline.  Hydration status: euvolemic                Vitals Value Taken Time   /72 03/15/24 1256   Temp 36.5 °C (97.7 °F) 03/15/24 1212   Pulse 77 03/15/24 1256   Resp 16 03/15/24 1256   SpO2 96 % 03/15/24 1256         Event Time   Out of Recovery 12:05:00         Pain/Aristides Score: No data recorded         Pt presents to the ED c/o abd cramping and vomiting that started last night. Pt started taking Trulicity yesterday. POCT glucose in triage is 127. Pt denies CP, SOB, chills, fever.

## 2025-04-21 ENCOUNTER — HOSPITAL ENCOUNTER (EMERGENCY)
Facility: HOSPITAL | Age: 80
Discharge: HOME | End: 2025-04-21
Attending: FAMILY MEDICINE
Payer: MEDICARE

## 2025-04-21 ENCOUNTER — TELEPHONE (OUTPATIENT)
Dept: INTERNAL MEDICINE | Facility: CLINIC | Age: 80
End: 2025-04-21
Payer: MEDICARE

## 2025-04-21 VITALS
OXYGEN SATURATION: 98 % | SYSTOLIC BLOOD PRESSURE: 160 MMHG | HEIGHT: 64 IN | BODY MASS INDEX: 33.12 KG/M2 | TEMPERATURE: 98 F | DIASTOLIC BLOOD PRESSURE: 74 MMHG | RESPIRATION RATE: 18 BRPM | HEART RATE: 76 BPM | WEIGHT: 194 LBS

## 2025-04-21 DIAGNOSIS — L98.8 LESION OF SKIN OF BREAST: Primary | ICD-10-CM

## 2025-04-21 PROCEDURE — 99281 EMR DPT VST MAYX REQ PHY/QHP: CPT

## 2025-04-21 NOTE — TELEPHONE ENCOUNTER
----- Message from Tammy sent at 4/21/2025 12:17 PM CDT -----  Regarding: Dr. Renetta Mejia-Lump in Breast  Good afternoon, patient has called in stating she has found a lump in her left breast and the area has turned blue. Patient asked if she should go to the ER and was advised to. Please call patient to discuss as she was very nervous. LOV was on 04/01/25 and she will RTC on 08/21/25. Thank you

## 2025-04-21 NOTE — TELEPHONE ENCOUNTER
Call placed to patient. Left voice message to return call to clinic regarding her report of lump noted to her breast. Also instructed her she may report to ER ; if she is still concern.

## 2025-04-22 NOTE — ED PROVIDER NOTES
"Encounter Date: 4/21/2025       History     Chief Complaint   Patient presents with    lump in breast     Patient reports to the ed secondary to "lump" in right breast which was discovered on yesterday. Denies pain at this time. Gaurav ALDANA 79 y.o. female patient with a history of DM, HTN presents to the ED with feeling a lump under her right breast. The onset is yesterday.  Patient states it was small, and that she does not feel it today.  Patient states it was painful yesterday, but is not today.  Denies any other symptoms or concerns.  Patient does not follow with gynecology or have annual breast exams or mammograms.       The history is provided by the patient.     Review of patient's allergies indicates:   Allergen Reactions    Iodine Swelling     Past Medical History:   Diagnosis Date    Diabetes mellitus     Hypertension      Past Surgical History:   Procedure Laterality Date    HYSTERECTOMY      MAGNETIC RESONANCE IMAGING N/A 3/15/2024    Procedure: MRI (Magnetic Resonance Imagine);  Surgeon: Susanne Ellington MD;  Location: [facility];  Service: Anesthesiology;  Laterality: N/A;  MRI BRAIN W W/O CONTRAST WITH ANESTHESIA     Family History   Problem Relation Name Age of Onset    Diabetes Mother      Hypertension Mother      Intellectual disability Mother      Cancer Father      Mental illness Sister      Hypertension Sister      Diabetes Brother      Stroke Brother       Social History[1]  Review of Systems   Constitutional:  Negative for chills and fever.   Eyes:  Negative for visual disturbance.   Respiratory:  Negative for shortness of breath.    Cardiovascular:  Negative for chest pain.   Gastrointestinal:  Negative for nausea and vomiting.   Genitourinary:  Negative for difficulty urinating and dysuria.   Musculoskeletal:  Negative for arthralgias.   Skin:  Negative for color change and rash.   Neurological:  Negative for weakness and headaches.   Hematological:  Does not bruise/bleed easily. "   Psychiatric/Behavioral:  Negative for confusion.    All other systems reviewed and are negative.      Physical Exam     Initial Vitals [04/21/25 1848]   BP Pulse Resp Temp SpO2   (!) 173/86 72 18 97.9 °F (36.6 °C) (!) 87 %      MAP       --         Physical Exam    Nursing note and vitals reviewed.  Constitutional: She appears well-developed and well-nourished.   HENT:   Head: Normocephalic and atraumatic.   Right Ear: External ear normal.   Left Ear: External ear normal.   Nose: Nose normal. Mouth/Throat: Oropharynx is clear and moist.   Eyes: Conjunctivae and EOM are normal.   Neck: Neck supple.   Cardiovascular:  Normal rate, regular rhythm and normal heart sounds.     Exam reveals no gallop and no friction rub.       No murmur heard.  Pulmonary/Chest: Breath sounds normal. No respiratory distress. She has no wheezes. She has no rhonchi. She has no rales.   Right breast exhibits skin change. Right breast exhibits no inverted nipple, no mass, no nipple discharge and no tenderness. Left breast exhibits no inverted nipple, no mass, no nipple discharge, no skin change and no tenderness. No breast swelling or bleeding. Breasts are symmetrical.   Small hyperpigmented area under right breast, may have been a small ingrown hair or infected follicle, but no lump appreciated at this time.    Genitourinary: No breast swelling or bleeding.   Musculoskeletal:      Cervical back: Neck supple.     Neurological: She is alert and oriented to person, place, and time. GCS score is 15. GCS eye subscore is 4. GCS verbal subscore is 5. GCS motor subscore is 6.   Skin: Skin is warm and dry. Capillary refill takes less than 2 seconds.         ED Course   Procedures  Labs Reviewed - No data to display       Imaging Results    None          Medications - No data to display  Medical Decision Making  A 79 y.o. female patient with a history of DM, HTN presents to the ED with feeling a lump under her right breast. The onset is yesterday.   Patient states it was small, and that she does not feel it today.  Patient states it was painful yesterday, but is not today.  Denies any other symptoms or concerns.  Patient does not follow with gynecology or have annual breast exams or mammograms.       Clinical impression:  Lesion of skin of breast (Primary)    Patient is non-toxic appearing and tolerating nutritional intake. Patient's vital signs and clinical condition are stable for DC with ED Prescriptions    None        Follow-up: PCP or Internal medicine clinic within 3 days  Referrals made: Gynecology    Strict follow-up precautions given. Patient verbalizes understanding of treatment plan and ED return precautions.         Risk  Risk Details: Given strict ED return precautions. I have spoken with the patient and/or caregivers. I have explained the patient's condition, diagnoses and treatment plan based on the information available to me at this time. I have answered the patient's and/or caregiver's questions and addressed any concerns. The patient and/or caregivers have as good an understanding of the patient's diagnosis, condition and treatment plan as can be expected at this point. The patient's condition is stable and appropriate for discharge from the emergency department.      The patient will pursue further outpatient evaluation with the primary care physician or other designated or consulting physician as outlined in the discharge instructions. The patient and/or caregivers are agreeable to this plan of care and follow-up instructions have been explained in detail. The patient and/or caregivers have received these instructions in written format and have expressed an understanding of the discharge instructions. The patient and/or caregivers are aware that any significant change in condition or worsening of symptoms should prompt an immediate return to this or the closest emergency department or a call to 911.               Additional MDM:    Differential Diagnosis:   Other: The following diagnoses were also considered and will be evaluated: dermatitis, abscess and cellulitis.                                   Clinical Impression:  Final diagnoses:  [L98.8] Lesion of skin of breast (Primary)          ED Disposition Condition    Discharge Stable          ED Prescriptions    None       Follow-up Information       Follow up With Specialties Details Why Contact Info    [facility] - Emergency Dept Emergency Medicine Go to  If symptoms worsen, As needed [address] [place] [address] [phone]    Hilda Maldonado MD Internal Medicine In 3 days  [address] [address]  [phone]      [facility] - GYN Gynecology   [address] [place] [address] [phone]               [1]   Social History  Tobacco Use    Smoking status: Former    Smokeless tobacco: Former   Substance Use Topics    Alcohol use: Not Currently    Drug use: Not Currently        Nora Quinonez PA  04/21/25 2136

## 2025-04-24 ENCOUNTER — TELEPHONE (OUTPATIENT)
Dept: INTERNAL MEDICINE | Facility: CLINIC | Age: 80
End: 2025-04-24
Payer: MEDICARE

## 2025-04-24 NOTE — TELEPHONE ENCOUNTER
Pt contacted via phone regarding her lump on her breast. Pt states she went to ER and further tested has been ordered. Inform patient to contact her PCP if needed.

## 2025-05-02 ENCOUNTER — TELEPHONE (OUTPATIENT)
Dept: INTERNAL MEDICINE | Facility: CLINIC | Age: 80
End: 2025-05-02
Payer: MEDICARE

## 2025-05-02 NOTE — TELEPHONE ENCOUNTER
[facility] called stating orders for a rollator sent by Dr Mejia, he is not [place] certified. They are requesting new orders for the rollator to be signed by Dr Marques. Again current signature is not valid by Dr Mejia because he isn't [employer] certified.

## 2025-05-05 ENCOUNTER — HOSPITAL ENCOUNTER (EMERGENCY)
Facility: HOSPITAL | Age: 80
Discharge: HOME | End: 2025-05-05
Attending: INTERNAL MEDICINE
Payer: MEDICARE

## 2025-05-05 ENCOUNTER — TELEPHONE (OUTPATIENT)
Dept: INTERNAL MEDICINE | Facility: CLINIC | Age: 80
End: 2025-05-05
Payer: MEDICARE

## 2025-05-05 VITALS
RESPIRATION RATE: 18 BRPM | WEIGHT: 201 LBS | SYSTOLIC BLOOD PRESSURE: 155 MMHG | HEART RATE: 90 BPM | DIASTOLIC BLOOD PRESSURE: 88 MMHG | BODY MASS INDEX: 34.31 KG/M2 | OXYGEN SATURATION: 98 % | TEMPERATURE: 98 F | HEIGHT: 64 IN

## 2025-05-05 DIAGNOSIS — M19.042 ARTHRITIS OF LEFT HAND: Primary | ICD-10-CM

## 2025-05-05 DIAGNOSIS — G56.00 CARPAL TUNNEL SYNDROME: ICD-10-CM

## 2025-05-05 LAB
ALBUMIN SERPL-MCNC: 3.8 G/DL (ref 3.4–4.8)
ALBUMIN/GLOB SERPL: 0.8 RATIO (ref 1.1–2)
ALP SERPL-CCNC: 52 UNIT/L (ref 40–150)
ALT SERPL-CCNC: 20 UNIT/L (ref 0–55)
ANION GAP SERPL CALC-SCNC: 8 MEQ/L
AST SERPL-CCNC: 16 UNIT/L (ref 11–45)
BASOPHILS # BLD AUTO: 0.07 X10(3)/MCL
BASOPHILS NFR BLD AUTO: 0.7 %
BILIRUB SERPL-MCNC: 0.4 MG/DL
BUN SERPL-MCNC: 17.8 MG/DL (ref 9.8–20.1)
CALCIUM SERPL-MCNC: 9.5 MG/DL (ref 8.4–10.2)
CHLORIDE SERPL-SCNC: 106 MMOL/L (ref 98–107)
CO2 SERPL-SCNC: 27 MMOL/L (ref 23–31)
CREAT SERPL-MCNC: 0.98 MG/DL (ref 0.55–1.02)
CREAT/UREA NIT SERPL: 18
EOSINOPHIL # BLD AUTO: 0.27 X10(3)/MCL (ref 0–0.9)
EOSINOPHIL NFR BLD AUTO: 2.7 %
ERYTHROCYTE [DISTWIDTH] IN BLOOD BY AUTOMATED COUNT: 13.5 % (ref 11.5–17)
GFR SERPLBLD CREATININE-BSD FMLA CKD-EPI: 58 ML/MIN/1.73/M2
GLOBULIN SER-MCNC: 4.5 GM/DL (ref 2.4–3.5)
GLUCOSE SERPL-MCNC: 141 MG/DL (ref 82–115)
HCT VFR BLD AUTO: 40.7 % (ref 37–47)
HGB BLD-MCNC: 13.4 G/DL (ref 12–16)
IMM GRANULOCYTES # BLD AUTO: 0.04 X10(3)/MCL (ref 0–0.04)
IMM GRANULOCYTES NFR BLD AUTO: 0.4 %
LYMPHOCYTES # BLD AUTO: 4.31 X10(3)/MCL (ref 0.6–4.6)
LYMPHOCYTES NFR BLD AUTO: 43.4 %
MCH RBC QN AUTO: 27.6 PG (ref 27–31)
MCHC RBC AUTO-ENTMCNC: 32.9 G/DL (ref 33–36)
MCV RBC AUTO: 83.7 FL (ref 80–94)
MONOCYTES # BLD AUTO: 0.83 X10(3)/MCL (ref 0.1–1.3)
MONOCYTES NFR BLD AUTO: 8.4 %
NEUTROPHILS # BLD AUTO: 4.42 X10(3)/MCL (ref 2.1–9.2)
NEUTROPHILS NFR BLD AUTO: 44.4 %
NRBC BLD AUTO-RTO: 0 %
PLATELET # BLD AUTO: 286 X10(3)/MCL (ref 130–400)
PMV BLD AUTO: 10.6 FL (ref 7.4–10.4)
POTASSIUM SERPL-SCNC: 3.7 MMOL/L (ref 3.5–5.1)
PROT SERPL-MCNC: 8.3 GM/DL (ref 5.8–7.6)
RBC # BLD AUTO: 4.86 X10(6)/MCL (ref 4.2–5.4)
SODIUM SERPL-SCNC: 141 MMOL/L (ref 136–145)
WBC # BLD AUTO: 9.94 X10(3)/MCL (ref 4.5–11.5)

## 2025-05-05 PROCEDURE — 80053 COMPREHEN METABOLIC PANEL: CPT

## 2025-05-05 PROCEDURE — 25000003 PHARM REV CODE 250

## 2025-05-05 PROCEDURE — 85025 COMPLETE CBC W/AUTO DIFF WBC: CPT

## 2025-05-05 PROCEDURE — 99284 EMERGENCY DEPT VISIT MOD MDM: CPT | Mod: 25

## 2025-05-05 RX ORDER — PREDNISONE 10 MG/1
5 TABLET ORAL DAILY
Qty: 3 TABLET | Refills: 0 | Status: SHIPPED | OUTPATIENT
Start: 2025-05-05 | End: 2025-05-10

## 2025-05-05 RX ORDER — DICLOFENAC SODIUM 10 MG/G
2 GEL TOPICAL 2 TIMES DAILY
Qty: 200 G | Refills: 0 | Status: SHIPPED | OUTPATIENT
Start: 2025-05-05 | End: 2025-05-12

## 2025-05-05 RX ORDER — NAPROXEN 250 MG/1
500 TABLET ORAL
Status: COMPLETED | OUTPATIENT
Start: 2025-05-05 | End: 2025-05-05

## 2025-05-05 RX ADMIN — NAPROXEN 500 MG: 250 TABLET ORAL at 06:05

## 2025-05-05 NOTE — TELEPHONE ENCOUNTER
Ritu with [employer] [facility] called requesting [place] certified physician to sign off on paperwork for a Rollator walker. Ms. Gutierrez is also requesting a call back at [phone]. She states that Dr. Mejia is not certified and that the order would have to be placed again, and re-faxed.

## 2025-05-07 NOTE — ED PROVIDER NOTES
Encounter Date: 5/5/2025       History     Chief Complaint   Patient presents with    Hand Pain     Reports left hand pain with finger tip numbness x1 month. No injury. Hx of DM     A 79 y.o. female patient with a history of diabetes, hypertension presents to the ED with left hand pain, numbness, tingling. The onset is 1 month ago.  Patient denies any injury.  Patient has never had carpal tunnel in the past.  The sensation is just on the anterior aspect of her palm to her fingertips.  Patient states occasionally she gets left-handed weakness and drops things.      The history is provided by the patient.     Review of patient's allergies indicates:   Allergen Reactions    Iodine Swelling     Past Medical History:   Diagnosis Date    Diabetes mellitus     Hypertension      Past Surgical History:   Procedure Laterality Date    HYSTERECTOMY      MAGNETIC RESONANCE IMAGING N/A 3/15/2024    Procedure: MRI (Magnetic Resonance Imagine);  Surgeon: Susanne Ellington MD;  Location: [facility];  Service: Anesthesiology;  Laterality: N/A;  MRI BRAIN W W/O CONTRAST WITH ANESTHESIA     Family History   Problem Relation Name Age of Onset    Diabetes Mother      Hypertension Mother      Intellectual disability Mother      Cancer Father      Mental illness Sister      Hypertension Sister      Diabetes Brother      Stroke Brother       Social History[1]  Review of Systems   Constitutional:  Negative for chills and fever.   Eyes:  Negative for visual disturbance.   Respiratory:  Negative for shortness of breath.    Cardiovascular:  Negative for chest pain.   Gastrointestinal:  Negative for nausea and vomiting.   Genitourinary:  Negative for difficulty urinating and dysuria.   Musculoskeletal:  Positive for arthralgias and myalgias.   Skin:  Negative for color change and rash.   Neurological:  Negative for weakness and headaches.   Hematological:  Does not bruise/bleed easily.   Psychiatric/Behavioral:  Negative for confusion.    All  other systems reviewed and are negative.      Physical Exam     Initial Vitals [05/05/25 1801]   BP Pulse Resp Temp SpO2   (!) 191/94 93 18 98 °F (36.7 °C) 97 %      MAP       --         Physical Exam    Nursing note and vitals reviewed.  Constitutional: She appears well-developed and well-nourished.   HENT:   Head: Normocephalic and atraumatic.   Right Ear: External ear normal.   Left Ear: External ear normal.   Nose: Nose normal. Mouth/Throat: Oropharynx is clear and moist.   Eyes: Conjunctivae and EOM are normal.   Neck: Neck supple.   Cardiovascular:  Normal rate, regular rhythm and normal heart sounds.     Exam reveals no gallop and no friction rub.       No murmur heard.  Pulmonary/Chest: Breath sounds normal. No respiratory distress. She has no wheezes. She has no rhonchi. She has no rales.   Musculoskeletal:      Left wrist: Tenderness (Percussion over median nerve elicits symptoms) present. No swelling, deformity, effusion, lacerations, bony tenderness or snuff box tenderness. Normal range of motion. Normal pulse.      Left hand: No swelling, deformity, lacerations, tenderness or bony tenderness. Normal range of motion. Normal strength. Normal sensation. There is no disruption of two-point discrimination. Normal capillary refill. Normal pulse.      Cervical back: Neck supple.     Neurological: She is alert and oriented to person, place, and time. GCS score is 15. GCS eye subscore is 4. GCS verbal subscore is 5. GCS motor subscore is 6.   Skin: Skin is warm and dry. Capillary refill takes less than 2 seconds.         ED Course   Procedures  Labs Reviewed   COMPREHENSIVE METABOLIC PANEL - Abnormal       Result Value    Sodium 141      Potassium 3.7      Chloride 106      CO2 27      Glucose 141 (*)     Blood Urea Nitrogen 17.8      Creatinine 0.98      Calcium 9.5      Protein Total 8.3 (*)     Albumin 3.8      Globulin 4.5 (*)     Albumin/Globulin Ratio 0.8 (*)     Bilirubin Total 0.4      ALP 52      ALT 20       AST 16      eGFR 58      Anion Gap 8.0      BUN/Creatinine Ratio 18     CBC WITH DIFFERENTIAL - Abnormal    WBC 9.94      RBC 4.86      Hgb 13.4      Hct 40.7      MCV 83.7      MCH 27.6      MCHC 32.9 (*)     RDW 13.5      Platelet 286      MPV 10.6 (*)     Neut % 44.4      Lymph % 43.4      Mono % 8.4      Eos % 2.7      Basophil % 0.7      Imm Grans % 0.4      Neut # 4.42      Lymph # 4.31      Mono # 0.83      Eos # 0.27      Baso # 0.07      Imm Gran # 0.04      NRBC% 0.0     CBC W/ AUTO DIFFERENTIAL    Narrative:     The following orders were created for panel order CBC Auto Differential.  Procedure                               Abnormality         Status                     ---------                               -----------         ------                     CBC with Differential[[id number]]       Abnormal            Final result                 Please view results for these tests on the individual orders.          Imaging Results              X-Ray Wrist Complete Left (Final result)  Result time 05/05/25 18:57:35      Final result by Cal Wade MD (05/05/25 18:57:35)                   Impression:      No acute process      Electronically signed by: Kb Wade  Date:    05/05/2025  Time:    18:57               Narrative:    EXAMINATION:  XR WRIST COMPLETE 3 VIEWS LEFT    CLINICAL HISTORY:  Carpal tunnel syndrome, unspecified upper limb    TECHNIQUE:  PA, lateral, and oblique views of the left wrist were performed.    COMPARISON:  None    FINDINGS:  The bones and joints are in good anatomic alignment.  No fracture seen.  No dislocation is seen.  Some degenerative changes are seen in the triquetrum.  No soft tissue abnormality seen.                                       Medications   naproxen tablet 500 mg (500 mg Oral Given 5/5/25 7547)     Medical Decision Making  A 79 y.o. female patient with a history of diabetes, hypertension presents to the ED with left hand pain, numbness, tingling.  The onset is 1 month ago.  Patient denies any injury.  Patient has never had carpal tunnel in the past.  The sensation is just on the anterior aspect of her palm to her fingertips.  Patient states occasionally she gets left-handed weakness and drops things.      Patient's condition improved with the following Medications  naproxen tablet 500 mg (500 mg Oral Given 5/5/25 1857)    Clinical impression:  Carpal tunnel syndrome  Arthritis of left hand (Primary)    Patient is non-toxic appearing and tolerating nutritional intake. Patient's vital signs and clinical condition are stable for DC with ED Prescriptions     Medication Sig Dispense Start Date End Date Auth. Provider    predniSONE (DELTASONE) 10 MG tablet Take 0.5 tablets (5 mg total) by   mouth once daily. Take 1 tab x 4 days, then take 0.5 tab x 4 days. for 5   days 3 tablet 5/5/2025 5/10/2025 Nora Quinonez PA    diclofenac sodium (VOLTAREN) 1 % Gel Apply 2 g topically 2 (two) times   daily. for 7 days 200 g 5/5/2025 5/12/2025 Nora Quinonez PA         Follow-up: PCP or Internal medicine clinic within 3 days  Referrals made: none    Strict follow-up precautions given. Patient verbalizes understanding of treatment plan and ED return precautions.         Amount and/or Complexity of Data Reviewed  Labs: ordered. Decision-making details documented in ED Course.  Radiology: ordered. Decision-making details documented in ED Course.    Risk  Prescription drug management.  Risk Details: Given strict ED return precautions. I have spoken with the patient and/or caregivers. I have explained the patient's condition, diagnoses and treatment plan based on the information available to me at this time. I have answered the patient's and/or caregiver's questions and addressed any concerns. The patient and/or caregivers have as good an understanding of the patient's diagnosis, condition and treatment plan as can be expected at this point. The patient's condition is stable  and appropriate for discharge from the emergency department.      The patient will pursue further outpatient evaluation with the primary care physician or other designated or consulting physician as outlined in the discharge instructions. The patient and/or caregivers are agreeable to this plan of care and follow-up instructions have been explained in detail. The patient and/or caregivers have received these instructions in written format and have expressed an understanding of the discharge instructions. The patient and/or caregivers are aware that any significant change in condition or worsening of symptoms should prompt an immediate return to this or the closest emergency department or a call to 911.               Additional MDM:   Differential Diagnosis:   Other: The following diagnoses were also considered and will be evaluated: Contusion, Strain and Sprain.            ED Course as of 05/06/25 2209   Mon May 05, 2025   1903 X-Ray Wrist Complete Left  The bones and joints are in good anatomic alignment.  No fracture seen.  No dislocation is seen.  Some degenerative changes are seen in the triquetrum.  No soft tissue abnormality seen.   [AG]   1909 Creatinine: 0.98 [AG]      ED Course User Index  [AG] Nora Quinonez PA                           Clinical Impression:  Final diagnoses:  [G56.00] Carpal tunnel syndrome  [M19.042] Arthritis of left hand (Primary)          ED Disposition Condition    Discharge Stable          ED Prescriptions       Medication Sig Dispense Start Date End Date Auth. Provider    predniSONE (DELTASONE) 10 MG tablet Take 0.5 tablets (5 mg total) by mouth once daily. Take 1 tab x 4 days, then take 0.5 tab x 4 days. for 5 days 3 tablet 5/5/2025 5/10/2025 Nora Quinonez PA    diclofenac sodium (VOLTAREN) 1 % Gel Apply 2 g topically 2 (two) times daily. for 7 days 200 g 5/5/2025 5/12/2025 Nora Quinonez PA          Follow-up Information       Follow up With Specialties Details Why  Contact Info    [facility] Emergency Medicine Go to  If symptoms worsen, As needed [address] [facility] [address] [phone]    Hilda Maldonado MD Internal Medicine In 3 days  [address] [facility] [address]  [phone]                 [1]   Social History  Tobacco Use    Smoking status: Former    Smokeless tobacco: Former   Substance Use Topics    Alcohol use: Not Currently    Drug use: Not Currently        Nora Quinonez PA  05/06/25 0262

## 2025-05-25 ENCOUNTER — HOSPITAL ENCOUNTER (EMERGENCY)
Facility: HOSPITAL | Age: 80
Discharge: HOME | End: 2025-05-25
Attending: FAMILY MEDICINE
Payer: MEDICARE

## 2025-05-25 VITALS
HEIGHT: 64 IN | RESPIRATION RATE: 18 BRPM | BODY MASS INDEX: 37.39 KG/M2 | DIASTOLIC BLOOD PRESSURE: 84 MMHG | HEART RATE: 67 BPM | WEIGHT: 219 LBS | OXYGEN SATURATION: 100 % | SYSTOLIC BLOOD PRESSURE: 164 MMHG | TEMPERATURE: 98 F

## 2025-05-25 DIAGNOSIS — M79.671 RIGHT FOOT PAIN: ICD-10-CM

## 2025-05-25 PROCEDURE — 63600175 PHARM REV CODE 636 W HCPCS: Mod: JZ,TB | Performed by: NURSE PRACTITIONER

## 2025-05-25 PROCEDURE — 96372 THER/PROPH/DIAG INJ SC/IM: CPT | Performed by: NURSE PRACTITIONER

## 2025-05-25 PROCEDURE — 99284 EMERGENCY DEPT VISIT MOD MDM: CPT | Mod: 25

## 2025-05-25 RX ORDER — KETOROLAC TROMETHAMINE 30 MG/ML
15 INJECTION, SOLUTION INTRAMUSCULAR; INTRAVENOUS
Status: COMPLETED | OUTPATIENT
Start: 2025-05-25 | End: 2025-05-25

## 2025-05-25 RX ORDER — MELOXICAM 7.5 MG/1
7.5 TABLET ORAL DAILY PRN
Qty: 12 TABLET | Refills: 0 | Status: SHIPPED | OUTPATIENT
Start: 2025-05-25

## 2025-05-25 RX ADMIN — KETOROLAC TROMETHAMINE 15 MG: 60 INJECTION, SOLUTION INTRAMUSCULAR at 05:05

## 2025-05-25 NOTE — ED PROVIDER NOTES
Encounter Date: 5/25/2025       History     Chief Complaint   Patient presents with    Foot Pain     C/o R foot pain in arch of foot since waking up this morning. Denies any injury     The patient presents with right foot pain. The onset was this morning.  The course/duration of symptoms is constant. Type of injury: none and none known. Location: right foot. The character of symptoms is pain and swelling.  The degree at present is minimal. There are exacerbating factors including movement, weight bearing and walking.  The relieving factor is rest. Risk factors consist of diabetes, hypertension. Prior episodes: occasional. Therapy today: none. Associated symptoms: none.        Review of patient's allergies indicates:   Allergen Reactions    Iodine Swelling     Past Medical History:   Diagnosis Date    Diabetes mellitus     Hypertension      Past Surgical History:   Procedure Laterality Date    HYSTERECTOMY      MAGNETIC RESONANCE IMAGING N/A 3/15/2024    Procedure: MRI (Magnetic Resonance Imagine);  Surgeon: Susanne Ellington MD;  [facility] [facility];  Service: Anesthesiology;  Laterality: N/A;  MRI BRAIN W W/O CONTRAST WITH ANESTHESIA     Family History   Problem Relation Name Age of Onset    Diabetes Mother      Hypertension Mother      Intellectual disability Mother      Cancer Father      Mental illness Sister      Hypertension Sister      Diabetes Brother      Stroke Brother       Social History[1]  Review of Systems   Constitutional:  Negative for fever.   HENT:  Negative for sore throat.    Respiratory:  Negative for shortness of breath.    Cardiovascular:  Negative for chest pain.   Gastrointestinal:  Negative for nausea.   Genitourinary:  Negative for dysuria.   Musculoskeletal:  Positive for arthralgias. Negative for back pain.   Skin:  Negative for rash.   Neurological:  Negative for weakness.   Hematological:  Does not bruise/bleed easily.   All other systems reviewed and are negative.      Physical Exam      Initial Vitals [05/25/25 1641]   BP Pulse Resp Temp SpO2   (!) 164/84 67 18 98.2 °F (36.8 °C) 100 %      MAP       --         Physical Exam    Nursing note and vitals reviewed.  Constitutional: She appears well-developed and well-nourished.   HENT:   Head: Normocephalic and atraumatic.   Neck: Neck supple.   Normal range of motion.  Cardiovascular:  Normal rate, regular rhythm, normal heart sounds and intact distal pulses.           Pulmonary/Chest: Effort normal and breath sounds normal.   Abdominal: Abdomen is soft and flat. Bowel sounds are normal. There is no abdominal tenderness.   Musculoskeletal:         General: Normal range of motion.      Cervical back: Normal range of motion and neck supple.      Comments: Right Foot: mild ttp without swelling, foot warm with good pulses, NVI       Neurological: She is alert. She has normal strength.   Skin: Skin is warm and dry.   Psychiatric: She has a normal mood and affect.         ED Course   Procedures  Labs Reviewed - No data to display       Imaging Results              X-Ray Foot Complete Right (Preliminary result)  Result time 05/25/25 18:09:52      Wet Read by Senthil Adam ACNP (05/25/25 18:09:52, [facility], Emergency Medicine)    No fracture, nothing acute                                     Medications   ketorolac injection 15 mg (15 mg Intramuscular Given 5/25/25 1709)     Medical Decision Making  The patient presents with right foot pain. The onset was this morning.  The course/duration of symptoms is constant. Type of injury: none and none known. Location: right foot. The character of symptoms is pain and swelling.  The degree at present is minimal. There are exacerbating factors including movement, weight bearing and walking.  The relieving factor is rest. Risk factors consist of diabetes, hypertension. Prior episodes: occasional. Therapy today: none. Associated symptoms: none.      6:24 PM DISPOSITION: The patient is  resting comfortably in no acute distress.  She is hemodynamically stable and is without objective evidence for acute process requiring urgent intervention or hospitalization. I provided counseling to patient with regard to condition, the treatment plan, specific conditions for return, and the importance of follow up. Detailed written and verbal instructions provided to patient and she expressed a verbal understanding of the discharge instructions and overall management plan. Reiterated the importance of medication administration and safety and advised patient to follow up with primary care provider in 3-5 days or sooner if needed.  Answered questions at this time. The patient is stable for discharge.       Amount and/or Complexity of Data Reviewed  Radiology: ordered and independent interpretation performed. Decision-making details documented in ED Course.    Risk  Prescription drug management.      Additional MDM:   Differential Diagnosis:   At this time differential diagnosis is but not limited to fracture, sprain, contusion, arthritis              ED Course as of 05/25/25 1825   Sun May 25, 2025   1809 X-Ray Foot Complete Right  No fracture, nothing acute [RB]   1823 Given strict ED return precautions. I have spoken with the patient and/or caregivers. I have explained the patient's condition, diagnoses and treatment plan based on the information available to me at this time. I have answered the patient's and/or caregiver's questions and addressed any concerns. The patient and/or caregivers have as good an understanding of the patient's diagnosis, condition and treatment plan as can be expected at this point. The vital signs have been stable. The patient's condition is stable and appropriate for discharge from the emergency department.      The patient will pursue further outpatient evaluation with the primary care physician or other designated or consulting physician as outlined in the discharge instructions. The  patient and/or caregivers are agreeable to this plan of care and follow-up instructions have been explained in detail. The patient and/or caregivers have received these instructions in written format and have expressed an understanding of the discharge instructions. The patient and/or caregivers are aware that any significant change in condition or worsening of symptoms should prompt an immediate return to this or the closest emergency department or a call to 911.      [RB]      ED Course User Index  [RB] Senthil Adam ACNP                           Clinical Impression:  Final diagnoses:  [M79.671] Right foot pain          ED Disposition Condition    Discharge Stable          ED Prescriptions       Medication Sig Dispense Start Date End Date Auth. Provider    meloxicam (MOBIC) 7.5 MG tablet Take 1 tablet (7.5 mg total) by mouth daily as needed for Pain. Don't take with ibuprofen, motrin, aleve, naprosyn or any other NSAID. 12 tablet 5/25/2025 -- Senthil Adam ACNP          Follow-up Information       Follow up With Specialties Details Why Contact Info    Hilda Maldonado MD Internal Medicine In 3 days  [address] [facility] [address]  [phone]      [facility] Emergency Medicine  If symptoms worsen [address] [facility] [address] [phone]                   [1]   Social History  Tobacco Use    Smoking status: Former    Smokeless tobacco: Former   Substance Use Topics    Alcohol use: Not Currently    Drug use: Not Currently        Senthil Adam ACNP  05/25/25 7144

## 2025-08-21 ENCOUNTER — OFFICE VISIT (OUTPATIENT)
Dept: INTERNAL MEDICINE | Facility: CLINIC | Age: 80
End: 2025-08-21
Payer: MEDICARE

## 2025-08-21 VITALS
OXYGEN SATURATION: 98 % | HEART RATE: 73 BPM | SYSTOLIC BLOOD PRESSURE: 158 MMHG | WEIGHT: 200.19 LBS | BODY MASS INDEX: 34.18 KG/M2 | TEMPERATURE: 98 F | DIASTOLIC BLOOD PRESSURE: 70 MMHG | RESPIRATION RATE: 20 BRPM | HEIGHT: 64 IN

## 2025-08-21 DIAGNOSIS — E78.5 HYPERLIPIDEMIA, UNSPECIFIED HYPERLIPIDEMIA TYPE: Primary | ICD-10-CM

## 2025-08-21 DIAGNOSIS — E11.9 TYPE 2 DIABETES MELLITUS WITHOUT COMPLICATION, WITHOUT LONG-TERM CURRENT USE OF INSULIN: ICD-10-CM

## 2025-08-21 DIAGNOSIS — E08.42 DIABETIC POLYNEUROPATHY ASSOCIATED WITH DIABETES MELLITUS DUE TO UNDERLYING CONDITION: ICD-10-CM

## 2025-08-21 DIAGNOSIS — M48.061 SPINAL STENOSIS OF LUMBAR REGION WITHOUT NEUROGENIC CLAUDICATION: ICD-10-CM

## 2025-08-21 DIAGNOSIS — I10 PRIMARY HYPERTENSION: ICD-10-CM

## 2025-08-21 PROCEDURE — 99214 OFFICE O/P EST MOD 30 MIN: CPT | Mod: PBBFAC | Performed by: INTERNAL MEDICINE

## 2025-08-21 RX ORDER — METFORMIN HYDROCHLORIDE 500 MG/1
500 TABLET ORAL
Qty: 90 TABLET | Refills: 3 | Status: SHIPPED | OUTPATIENT
Start: 2025-08-21 | End: 2026-08-21

## 2025-08-21 RX ORDER — METFORMIN HYDROCHLORIDE 1000 MG/1
1000 TABLET ORAL
Qty: 90 TABLET | Refills: 3 | Status: SHIPPED | OUTPATIENT
Start: 2025-08-21 | End: 2025-08-21

## 2025-08-21 RX ORDER — GABAPENTIN 300 MG/1
300 CAPSULE ORAL 3 TIMES DAILY
Qty: 90 CAPSULE | Refills: 11 | Status: SHIPPED | OUTPATIENT
Start: 2025-08-21 | End: 2026-08-21

## 2025-08-21 RX ORDER — AMLODIPINE BESYLATE 10 MG/1
10 TABLET ORAL DAILY
Qty: 30 TABLET | Refills: 11 | Status: SHIPPED | OUTPATIENT
Start: 2025-08-21 | End: 2026-08-21

## 2025-08-21 RX ORDER — METFORMIN HYDROCHLORIDE 500 MG/1
500 TABLET ORAL 2 TIMES DAILY WITH MEALS
Qty: 180 TABLET | Refills: 3 | Status: SHIPPED | OUTPATIENT
Start: 2025-08-21 | End: 2025-08-21

## 2025-08-21 RX ORDER — DULOXETIN HYDROCHLORIDE 20 MG/1
20 CAPSULE, DELAYED RELEASE ORAL DAILY
Qty: 90 CAPSULE | Refills: 3 | Status: SHIPPED | OUTPATIENT
Start: 2025-08-21 | End: 2026-08-21

## 2025-08-21 RX ORDER — CARVEDILOL 3.12 MG/1
3.12 TABLET ORAL 2 TIMES DAILY WITH MEALS
Qty: 180 TABLET | Refills: 3 | Status: SHIPPED | OUTPATIENT
Start: 2025-08-21 | End: 2026-08-21

## 2025-09-03 ENCOUNTER — CLINICAL SUPPORT (OUTPATIENT)
Facility: CLINIC | Age: 80
End: 2025-09-03
Payer: MEDICARE

## 2025-09-03 DIAGNOSIS — I10 PRIMARY HYPERTENSION: Primary | ICD-10-CM

## 2025-09-03 PROCEDURE — 99213 OFFICE O/P EST LOW 20 MIN: CPT | Mod: PBBFAC,PN

## 2025-09-05 DIAGNOSIS — E08.42 DIABETIC POLYNEUROPATHY ASSOCIATED WITH DIABETES MELLITUS DUE TO UNDERLYING CONDITION: ICD-10-CM

## 2025-09-05 DIAGNOSIS — M48.061 SPINAL STENOSIS OF LUMBAR REGION WITHOUT NEUROGENIC CLAUDICATION: Primary | ICD-10-CM

## 2025-09-05 RX ORDER — TRIAMTERENE AND HYDROCHLOROTHIAZIDE 75; 50 MG/1; MG/1
1 TABLET ORAL DAILY
Qty: 90 TABLET | Refills: 3 | Status: SHIPPED | OUTPATIENT
Start: 2025-09-05

## 2025-09-05 RX ORDER — GABAPENTIN 300 MG/1
300 CAPSULE ORAL 3 TIMES DAILY
Qty: 90 CAPSULE | Refills: 3 | Status: SHIPPED | OUTPATIENT
Start: 2025-09-05 | End: 2026-09-05